# Patient Record
Sex: MALE | Race: WHITE | HISPANIC OR LATINO | Employment: FULL TIME | URBAN - METROPOLITAN AREA
[De-identification: names, ages, dates, MRNs, and addresses within clinical notes are randomized per-mention and may not be internally consistent; named-entity substitution may affect disease eponyms.]

---

## 2018-01-29 ENCOUNTER — IMMUNIZATION (OUTPATIENT)
Dept: URGENT CARE | Facility: CLINIC | Age: 53
End: 2018-01-29
Payer: COMMERCIAL

## 2018-01-29 PROCEDURE — 90686 IIV4 VACC NO PRSV 0.5 ML IM: CPT | Mod: S$GLB,,, | Performed by: EMERGENCY MEDICINE

## 2018-01-29 PROCEDURE — 90471 IMMUNIZATION ADMIN: CPT | Mod: S$GLB,,, | Performed by: EMERGENCY MEDICINE

## 2019-12-31 ENCOUNTER — HOSPITAL ENCOUNTER (OUTPATIENT)
Facility: HOSPITAL | Age: 54
LOS: 1 days | Discharge: HOME | End: 2019-12-31
Attending: PSYCHIATRY & NEUROLOGY | Admitting: PSYCHIATRY & NEUROLOGY
Payer: COMMERCIAL

## 2019-12-31 VITALS
WEIGHT: 190 LBS | HEIGHT: 68 IN | SYSTOLIC BLOOD PRESSURE: 134 MMHG | RESPIRATION RATE: 16 BRPM | BODY MASS INDEX: 28.79 KG/M2 | OXYGEN SATURATION: 99 % | TEMPERATURE: 98 F | DIASTOLIC BLOOD PRESSURE: 80 MMHG | HEART RATE: 72 BPM

## 2019-12-31 DIAGNOSIS — G47.00 INSOMNIA, UNSPECIFIED TYPE: Primary | ICD-10-CM

## 2019-12-31 DIAGNOSIS — G45.9 TIA (TRANSIENT ISCHEMIC ATTACK): ICD-10-CM

## 2019-12-31 PROBLEM — E78.5 HYPERLIPIDEMIA: Status: ACTIVE | Noted: 2019-12-31

## 2019-12-31 LAB
APTT BLDCRRT: 24 SEC (ref 21–32)
ASCENDING AORTA: 2.92 CM
AV INDEX (PROSTH): 0.81
AV MEAN GRADIENT: 2 MMHG
AV PEAK GRADIENT: 3 MMHG
AV VALVE AREA: 2.68 CM2
AV VELOCITY RATIO: 0.87
BILIRUB UR QL STRIP: NEGATIVE
BSA FOR ECHO PROCEDURE: 2.03 M2
CHOLEST SERPL-MCNC: 141 MG/DL (ref 120–199)
CHOLEST/HDLC SERPL: 4.7 {RATIO} (ref 2–5)
CK MB SERPL-MCNC: 1.2 NG/ML (ref 0.1–6.5)
CK MB SERPL-RTO: 0.8 % (ref 0–5)
CK SERPL-CCNC: 160 U/L (ref 20–200)
CLARITY UR REFRACT.AUTO: CLEAR
COLOR UR AUTO: YELLOW
CV ECHO LV RWT: 0.37 CM
DOP CALC AO PEAK VEL: 0.87 M/S
DOP CALC AO VTI: 18.04 CM
DOP CALC LVOT AREA: 3.3 CM2
DOP CALC LVOT DIAMETER: 2.05 CM
DOP CALC LVOT PEAK VEL: 0.76 M/S
DOP CALC LVOT STROKE VOLUME: 48.4 CM3
DOP CALCLVOT PEAK VEL VTI: 14.67 CM
E WAVE DECELERATION TIME: 256.88 MSEC
E/A RATIO: 1.22
E/E' RATIO: 8.35 M/S
ECHO LV POSTERIOR WALL: 0.96 CM (ref 0.6–1.1)
ESTIMATED AVG GLUCOSE: 117 MG/DL (ref 68–131)
FRACTIONAL SHORTENING: 23 % (ref 28–44)
GLUCOSE UR QL STRIP: NEGATIVE
HBA1C MFR BLD HPLC: 5.7 % (ref 4–5.6)
HDLC SERPL-MCNC: 30 MG/DL (ref 40–75)
HDLC SERPL: 21.3 % (ref 20–50)
HGB UR QL STRIP: NEGATIVE
INR PPP: 1.1 (ref 0.8–1.2)
INTERVENTRICULAR SEPTUM: 0.8 CM (ref 0.6–1.1)
KETONES UR QL STRIP: ABNORMAL
LA MAJOR: 5.34 CM
LA MINOR: 4.96 CM
LA WIDTH: 3.47 CM
LDLC SERPL CALC-MCNC: 91.8 MG/DL (ref 63–159)
LEFT ATRIUM SIZE: 3.45 CM
LEFT ATRIUM VOLUME INDEX: 26.2 ML/M2
LEFT ATRIUM VOLUME: 52.33 CM3
LEFT INTERNAL DIMENSION IN SYSTOLE: 4 CM (ref 2.1–4)
LEFT VENTRICLE DIASTOLIC VOLUME INDEX: 31.67 ML/M2
LEFT VENTRICLE DIASTOLIC VOLUME: 63.34 ML
LEFT VENTRICLE MASS INDEX: 82 G/M2
LEFT VENTRICLE SYSTOLIC VOLUME INDEX: 11.9 ML/M2
LEFT VENTRICLE SYSTOLIC VOLUME: 23.8 ML
LEFT VENTRICULAR INTERNAL DIMENSION IN DIASTOLE: 5.2 CM (ref 3.5–6)
LEFT VENTRICULAR MASS: 164.13 G
LEUKOCYTE ESTERASE UR QL STRIP: NEGATIVE
LV LATERAL E/E' RATIO: 8.88 M/S
LV SEPTAL E/E' RATIO: 7.89 M/S
MV PEAK A VEL: 0.58 M/S
MV PEAK E VEL: 0.71 M/S
NITRITE UR QL STRIP: NEGATIVE
NONHDLC SERPL-MCNC: 111 MG/DL
PH UR STRIP: 5 [PH] (ref 5–8)
PROT UR QL STRIP: NEGATIVE
PROTHROMBIN TIME: 11.2 SEC (ref 9–12.5)
RA MAJOR: 4.29 CM
RA PRESSURE: 3 MMHG
RA WIDTH: 2.77 CM
RIGHT VENTRICULAR END-DIASTOLIC DIMENSION: 2.86 CM
SINUS: 3.26 CM
SP GR UR STRIP: >1.03 (ref 1–1.03)
STJ: 2.98 CM
TDI LATERAL: 0.08 M/S
TDI SEPTAL: 0.09 M/S
TDI: 0.09 M/S
TRICUSPID ANNULAR PLANE SYSTOLIC EXCURSION: 2.3 CM
TRIGL SERPL-MCNC: 96 MG/DL (ref 30–150)
TROPONIN I SERPL DL<=0.01 NG/ML-MCNC: <0.006 NG/ML (ref 0–0.03)
TSH SERPL DL<=0.005 MIU/L-ACNC: 2.4 UIU/ML (ref 0.4–4)
URN SPEC COLLECT METH UR: ABNORMAL

## 2019-12-31 PROCEDURE — G8989 SELF CARE D/C STATUS: HCPCS | Mod: CH

## 2019-12-31 PROCEDURE — G0378 HOSPITAL OBSERVATION PER HR: HCPCS

## 2019-12-31 PROCEDURE — 99223 PR INITIAL HOSPITAL CARE,LEVL III: ICD-10-PCS | Mod: ,,, | Performed by: NURSE PRACTITIONER

## 2019-12-31 PROCEDURE — 97802 MEDICAL NUTRITION INDIV IN: CPT | Mod: 59

## 2019-12-31 PROCEDURE — 84484 ASSAY OF TROPONIN QUANT: CPT

## 2019-12-31 PROCEDURE — 85610 PROTHROMBIN TIME: CPT

## 2019-12-31 PROCEDURE — 82553 CREATINE MB FRACTION: CPT

## 2019-12-31 PROCEDURE — 36415 COLL VENOUS BLD VENIPUNCTURE: CPT

## 2019-12-31 PROCEDURE — 85730 THROMBOPLASTIN TIME PARTIAL: CPT

## 2019-12-31 PROCEDURE — 97161 PT EVAL LOW COMPLEX 20 MIN: CPT

## 2019-12-31 PROCEDURE — 82550 ASSAY OF CK (CPK): CPT

## 2019-12-31 PROCEDURE — G8988 SELF CARE GOAL STATUS: HCPCS | Mod: CH

## 2019-12-31 PROCEDURE — 25000003 PHARM REV CODE 250: Performed by: NURSE PRACTITIONER

## 2019-12-31 PROCEDURE — 99223 1ST HOSP IP/OBS HIGH 75: CPT | Mod: ,,, | Performed by: NURSE PRACTITIONER

## 2019-12-31 PROCEDURE — 81003 URINALYSIS AUTO W/O SCOPE: CPT

## 2019-12-31 PROCEDURE — G8987 SELF CARE CURRENT STATUS: HCPCS | Mod: CH

## 2019-12-31 PROCEDURE — 99217 PR OBSERVATION CARE DISCHARGE: ICD-10-PCS | Mod: ,,, | Performed by: PSYCHIATRY & NEUROLOGY

## 2019-12-31 PROCEDURE — 97165 OT EVAL LOW COMPLEX 30 MIN: CPT

## 2019-12-31 PROCEDURE — 80061 LIPID PANEL: CPT

## 2019-12-31 PROCEDURE — 84443 ASSAY THYROID STIM HORMONE: CPT

## 2019-12-31 PROCEDURE — 99217 PR OBSERVATION CARE DISCHARGE: CPT | Mod: ,,, | Performed by: PSYCHIATRY & NEUROLOGY

## 2019-12-31 PROCEDURE — 83036 HEMOGLOBIN GLYCOSYLATED A1C: CPT

## 2019-12-31 RX ORDER — LABETALOL HCL 20 MG/4 ML
10 SYRINGE (ML) INTRAVENOUS EVERY 6 HOURS PRN
Status: DISCONTINUED | OUTPATIENT
Start: 2019-12-31 | End: 2019-12-31 | Stop reason: HOSPADM

## 2019-12-31 RX ORDER — ATORVASTATIN CALCIUM 40 MG/1
40 TABLET, FILM COATED ORAL DAILY
Qty: 90 TABLET | Refills: 3 | Status: SHIPPED | OUTPATIENT
Start: 2020-01-01 | End: 2020-03-20

## 2019-12-31 RX ORDER — ACETAMINOPHEN 325 MG/1
650 TABLET ORAL EVERY 6 HOURS PRN
Status: DISCONTINUED | OUTPATIENT
Start: 2019-12-31 | End: 2019-12-31 | Stop reason: HOSPADM

## 2019-12-31 RX ORDER — ATORVASTATIN CALCIUM 20 MG/1
40 TABLET, FILM COATED ORAL DAILY
Status: DISCONTINUED | OUTPATIENT
Start: 2019-12-31 | End: 2019-12-31 | Stop reason: HOSPADM

## 2019-12-31 RX ORDER — ONDANSETRON 8 MG/1
8 TABLET, ORALLY DISINTEGRATING ORAL EVERY 8 HOURS PRN
Status: DISCONTINUED | OUTPATIENT
Start: 2019-12-31 | End: 2019-12-31 | Stop reason: HOSPADM

## 2019-12-31 RX ORDER — ASPIRIN 81 MG/1
81 TABLET ORAL DAILY
Status: DISCONTINUED | OUTPATIENT
Start: 2019-12-31 | End: 2019-12-31 | Stop reason: HOSPADM

## 2019-12-31 RX ORDER — SODIUM CHLORIDE 0.9 % (FLUSH) 0.9 %
10 SYRINGE (ML) INJECTION
Status: DISCONTINUED | OUTPATIENT
Start: 2019-12-31 | End: 2019-12-31 | Stop reason: HOSPADM

## 2019-12-31 RX ORDER — POLYETHYLENE GLYCOL 3350 17 G/17G
17 POWDER, FOR SOLUTION ORAL DAILY PRN
Status: DISCONTINUED | OUTPATIENT
Start: 2019-12-31 | End: 2019-12-31 | Stop reason: HOSPADM

## 2019-12-31 RX ORDER — ASPIRIN 81 MG/1
81 TABLET ORAL DAILY
Qty: 360 TABLET | Refills: 0 | Status: SHIPPED | OUTPATIENT
Start: 2020-01-01 | End: 2020-09-28 | Stop reason: SDUPTHER

## 2019-12-31 RX ADMIN — ASPIRIN 81 MG: 81 TABLET, COATED ORAL at 08:12

## 2019-12-31 RX ADMIN — ATORVASTATIN CALCIUM 40 MG: 20 TABLET, FILM COATED ORAL at 08:12

## 2019-12-31 NOTE — PLAN OF CARE
12/31/19 1149   Final Note   Assessment Type Final Discharge Note   Anticipated Discharge Disposition Home   Right Care Referral Info   Post Acute Recommendation No Care

## 2019-12-31 NOTE — HPI
53 y/o male who was driving from [facility] to [place] on 12-20-19 when around 1830 he started with dizziness and blurry vision in both eyes. He pulled over and called 911 and his wife. He then started with double and triple vision in both eyes. He was taken to [facility] in [place]. Symptoms resolved after 2 hours. No other focal neuro deficits. Since patient lives in [place] transfer to [place] was requested.   Patient usually goes to [facility] as PCP is Dr Blancas.  Upon arrival patient still with no neuro deficits. TIA workup will be carried out.   Patient has no real chronic medical issues and is on no medications

## 2019-12-31 NOTE — PLAN OF CARE
CM met with patient and spouse in room for [place] Planning Assessment.  Per patient,  patient lives with spouse in a H with 3 steps to enter.   Per patient, patient was independent with ADLS and used no DME for ambulation.  Per patient, the patient will have assistance from spouse upon discharge.   Discharge Planning Booklet given to patient/family and discussed.  All questions addressed.       12/31/19 1154   Discharge Assessment   Assessment Type Discharge Planning Assessment   Confirmed/corrected address and phone number on facesheet? Yes   Assessment information obtained from? Patient   Expected Length of Stay (days) 1   Communicated expected length of stay with patient/caregiver yes   Prior to hospitilization cognitive status: Alert/Oriented   Prior to hospitalization functional status: Independent   Current cognitive status: Alert/Oriented   Current Functional Status: Independent   Facility Arrived From: [facility] in [place]   Lives With spouse   Able to Return to Prior Arrangements yes   Is patient able to care for self after discharge? Yes   Who are your caregiver(s) and their phone number(s)? Purvi Smith (wife) [phone]   Patient's perception of discharge disposition home or selfcare   Readmission Within the Last 30 Days no previous admission in last 30 days   Patient currently being followed by outpatient case management? No   Patient currently receives any other outside agency services? No   Equipment Currently Used at Home none   Do you have any problems affording any of your prescribed medications? No   Is the patient taking medications as prescribed? yes   Does the patient have transportation home? Yes   Transportation Anticipated family or friend will provide  (spouse)   Dialysis Name and Scheduled days na   Does the patient receive services at the Coumadin Clinic? No   Discharge Plan A Home with family   Discharge Plan B Home with family   DME Needed Upon Discharge   none   Patient/Family in Agreement with Plan yes        PCP:  Mauri Blancas MD      Pharmacy[facility]    [id number] [address] [address]  Phone: [phone] Fax: [phone]        Emergency Contacts:  Extended Emergency Contact Information  Primary Emergency Contact: Purvi Smith   [place]  Mobile Phone: [phone]  Relation: Spouse      Insurance:    Payor: [employer] / Plan: [facility] CHOICE PLUS / Product Type: Commercial /       Mayra Mercedes RN  Case Management  Ext: [phone]  12/31/2019  11:58 AM

## 2019-12-31 NOTE — DISCHARGE SUMMARY
[facility]  Vascular Neurology  Comprehensive Stroke Center  Discharge Summary     Summary:     Admit Date: 12/31/2019  1:05 AM    Discharge Date and Time:  12/31/2019 11:02 AM    Attending Physician: Verónica Owusu MD     Discharge Provider: Rachel Rivera MD    History of Present Illness: 55 y/o male who was driving from [facility] to [place] on 12-20-19 when around 1830 he started with dizziness and blurry vision in both eyes. He pulled over and called 911 and his wife. He then started with double and triple vision in both eyes. He was taken to [facility] in [place]. Symptoms resolved after 2 hours. No other focal neuro deficits. Since patient lives in [place] transfer to [place] was requested.   Patient usually goes to [facility] as PCP is Dr Blancas.  Upon arrival patient still with no neuro deficits. TIA workup will be carried out.   Patient has no real chronic medical issues and is on no medications    Hospital Course (synopsis of major diagnoses, care, treatment, and services provided during the course of the hospital stay): Patient admitted overnight with TIA symptoms. Patient will be discharged on ASA 81 mg and lipitor 40 mg. Can follow up with vascular neurology in 4 weeks. Awaiting echo results which can be discussed during stroke clinic visit.     Stroke Etiology: Probable Small Artery Occlusion (LITTLE)    STROKE DOCUMENTATION   Acute Stroke Times   Last Known Normal Date: 12/30/19  Last Known Normal Time: 1830  Symptom Onset Date: 12/30/19  Symptom Onset Time: 1830  Stroke Team Called Date: 12/30/19  Stroke Team Arrival Date: 12/30/19  Stroke Team Arrival Time: 0050     NIH Scale:  1a. Level of Consciousness: 0-->Alert, keenly responsive  1b. LOC Questions: 0-->Answers both questions correctly  1c. LOC Commands: 0-->Performs both tasks correctly  2. Best Gaze: 0-->Normal  3. Visual: 0-->No visual loss  4. Facial Palsy: 0-->Normal  symmetrical movements  5a. Motor Arm, Left: 0-->No drift, limb holds 90 (or 45) degrees for full 10 secs  5b. Motor Arm, Right: 0-->No drift, limb holds 90 (or 45) degrees for full 10 secs  6a. Motor Leg, Left: 0-->No drift, leg holds 30 degree position for full 5 secs  6b. Motor Leg, Right: 0-->No drift, leg holds 30 degree position for full 5 secs  7. Limb Ataxia: 0-->Absent  8. Sensory: 0-->Normal, no sensory loss  9. Best Language: 0-->No aphasia, normal  10. Dysarthria: 0-->Normal  11. Extinction and Inattention (formerly Neglect): 0-->No abnormality  Total (NIH Stroke Scale): 0        Modified Lincoln Score: 0  Gove Coma Scale:15   ABCD2 Score: 2  [id number] Score:   HAS -BLED Score:   ICH Score:   Hunt & Thomas Classification:       Assessment/Plan:     Diagnostic Results:      Brain imaging:  CT head w/o contrast 12-30-19 reviewed from [facility]     Vessel Imaging:  CTA Head and neck 12-30-19 reviewed from [facility]    MRI Brain without contrast          Noncontrast MRI examination demonstrates no acute intracranial abnormality.  Specifically, no evidence of acute infarction.       Cardiac Evaluation:   TTE: pending     Interventions: None    Complications: None    Disposition:     Final Active Diagnoses:    Diagnosis Date Noted POA    PRINCIPAL PROBLEM:  TIA (transient ischemic attack) [G45.9] 12/31/2019 Yes    Hyperlipidemia [E78.5] 12/31/2019 Unknown    Insomnia [G47.00] 12/31/2019 Unknown      Problems Resolved During this Admission:     * TIA (transient ischemic attack)  53 y/o male with TIA involving dizziness and blurry/double vision for 2 hours that resolved.     Antithrombotics: ASA 81 mg daily    Statins: Lipitor 40 mg daily    Aggressive risk factor modification: None     Rehab efforts: The patient has been evaluated by a stroke team provider and the therapy needs have been fully considered based off the presenting  complaints and exam findings. The following therapy evaluations are needed: None    Diagnostics ordered/pending: HgbA1C to assess blood glucose levels, Lipid Profile to assess cholesterol levels, TTE to assess cardiac function/status     VTE prophylaxis: Heparin 5000 units SQ every 8 hours    BP parameters: TIA: SBP <220 until imaging confirmation of no infarct         Insomnia  Patient having symptoms of waking up frequently throughout the night. He is also feeling lethargic throughout the day as well. Will order sleep study as outpatient.     Hyperlipidemia  LDL 91.8  -Lipitor 40 mg daily         Recommendations:     Post-discharge complication risks: None     Stroke Education given to: patient and family    Follow-up in Stroke Clinic in 30 days.     Discharge Plan:  Antithrombotics: Aspirin 81 mg  Statin: Atorvastatin 40 mg    Follow Up:  Follow-up Information     Mauri Blancas MD.    Specialty:  Internal Medicine  Why:  Outpatient Services  Contact information:  [address]   [address] [place] [address]  [phone]             [facility] In 4 weeks.    Specialty:  Vascular Neurology  Contact information:  [facility] [address]  [phone]                 Patient Instructions:      Ambulatory Referral to Vascular Neurology   Referral Priority: Routine Referral Type: Consultation   Referral Reason: Specialty Services Required   Requested Specialty: Vascular Neurology   Number of Visits Requested: 1     Polysomnogram (CPAP will be added if patient meets diagnostic criteria.)   Standing Status: Future Standing Exp. Date: 12/31/20       Medications:  Reconciled Home Medications:      Medication List      START taking these medications    aspirin 81 MG EC tablet  Commonly known as:  ECOTRIN  Take 1 tablet (81 mg total) by mouth once daily.  Start taking on:  January 1, 2020     atorvastatin 40 MG tablet  Commonly known as:  LIPITOR  Take 1 tablet (40 mg  total) by mouth once daily.  Start taking on:  January 1, 2020            Rachel Rivera MD  [facility]  Department of Vascular Neurology   [facility]

## 2019-12-31 NOTE — PLAN OF CARE
12/31/19 1153   Post-Acute Status   Post-Acute Authorization HME   Other Status No Post-Acute Service Needs   Discharge Delays None known at this time   Mayra Mercedes RN  Case Management  Ext: [phone]  12/31/2019  11:54 AM

## 2019-12-31 NOTE — ASSESSMENT & PLAN NOTE
Patient having symptoms of waking up frequently throughout the night. He is also feeling lethargic throughout the day as well. Will order sleep study as outpatient.

## 2019-12-31 NOTE — PLAN OF CARE
.Pt resting in bed, no c/o pain, resp even and unlabored, all precautions set in place to prevent any falls.Call light in reach, bed alarm activated and bed low to floor.  Room dimmed, clutter free,all needed supplies near bedside.Will cont to monitor

## 2019-12-31 NOTE — PLAN OF CARE
Patient DENITA of 20. He is able to swallow solid food and drink water without any complications.   Rachel Rivera MD

## 2019-12-31 NOTE — NURSING
Pt arrived to unit via gurney at 0050 am, pt alert and oriented, no medical history, v/s stable,lungs clear, skin  Intact, no c/o any vision issues at the moment, gate when ambulating is steady, attempted to notify NP Berenice on call no answer for orders,will re try and call back for orders for pt.  Pt alert stable and wife and son at the bedside,  Bed alarm and fall risk socks on to prevent falls

## 2019-12-31 NOTE — ASSESSMENT & PLAN NOTE
55 y/o male with TIA involving dizziness and blurry/double vision for 2 hours that resolved.     Antithrombotics: ASA 81 mg daily    Statins: Lipitor 40 mg daily    Aggressive risk factor modification: None     Rehab efforts: The patient has been evaluated by a stroke team provider and the therapy needs have been fully considered based off the presenting complaints and exam findings. The following therapy evaluations are needed: None    Diagnostics ordered/pending: HgbA1C to assess blood glucose levels, Lipid Profile to assess cholesterol levels, TTE to assess cardiac function/status     VTE prophylaxis: Heparin 5000 units SQ every 8 hours    BP parameters: TIA: SBP <220 until imaging confirmation of no infarct

## 2019-12-31 NOTE — PLAN OF CARE
Problem: Occupational Therapy Goal  Goal: Occupational Therapy Goal  Outcome: Met     Pt presents with no functional impairments that would affect his ability to complete his self care tasks and functional mobility on this date. No skilled OT needed in this setting. Pt safe to discharge home with no further therapy needs.    Valery Huerta OTR/L  12/31/19

## 2019-12-31 NOTE — NURSING
Patient prepared for discharge, IV removed, discharge instructions reviewed, verbalized understanding.

## 2019-12-31 NOTE — PLAN OF CARE
Erik. Pt at baseline.    Kaley Parra, PT, DPT  12/31/2019      Problem: Physical Therapy Goal  Goal: Physical Therapy Goal  Outcome: Met

## 2019-12-31 NOTE — PT/OT/SLP EVAL
"Occupational Therapy   Evaluation and Discharge Note    Name: Calvin Smith  MRN: [id number]  Admitting Diagnosis:  TIA (transient ischemic attack)      Recommendations:     Discharge Recommendations: home  Discharge Equipment Recommendations:  none  Barriers to discharge:  None    Assessment:     Calvin Smith is a 54 y.o. male with a medical diagnosis of TIA (transient ischemic attack). Pt presented with WFL strength and ROM to complete occupations of choice. Pt tolerated evaluation well displaying no deficits affecting ADL performance. At this time, patient is functioning at their prior level of function and does not require further acute OT services.     Plan:     During this hospitalization, patient does not require further acute OT services.  Please re-consult if situation changes.    · Plan of Care Reviewed with: patient    Subjective     Chief Complaint: None   Patient/Family Comments/goals: Pt agreeable to OT/PT evaluation and OT POC.     Occupational Profile:  Living Environment: Pt lives with wife in a H with 3 or 4 MIKE and R handrail. 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     Pain/Comfort: "I'm close to 99%."     · Pain Rating 1: 0/10  · Pain Rating Post-Intervention 1: 0/10    Patients cultural, spiritual, Jew conflicts given the current situation: no    Objective:     Communicated with: RN prior to session.  Patient found HOB elevated with telemetry upon OT entry to room.    General Precautions: Standard, fall   Orthopedic Precautions:N/A   Braces: N/A     Occupational Performance:    Bed Mobility:    · Patient completed Rolling/Turning to Left with  independence  · Patient completed Scooting/Bridging with independence  · Patient completed Supine " to Sit with independence    Functional Mobility/Transfers:  · Patient completed Sit <> Stand Transfer from bed with independence  with  no assistive device   · Functional Mobility: Pt ambulated HHD with independence and no AD. No LOB noted. No RBs required.    Activities of Daily Living:  · Lower Body Dressing: independence donning socks EOB     Cognitive/Visual Perceptual:  Cognitive/Psychosocial Skills:     -       Oriented to: Person, Place, Time and Situation   -       Follows Commands/attention:Follows multistep  commands  -       Communication: clear/fluent  -       Memory: No Deficits noted  -       Safety awareness/insight to disability: intact   -       Mood/Affect/Coping skills/emotional control: Appropriate to situation  Visual/Perceptual:      -Intact no deficits noted during functional tasks; no blurriness reported with increased ax    Physical Exam:  Balance:    -       Intact  Postural examination/scapula alignment:    -       No postural abnormalities identified  Skin integrity: Visible skin intact  Edema:  None noted  Sensation:    -       Intact  Upper Extremity Range of Motion:     -       Right Upper Extremity: WFL  -       Left Upper Extremity: WFL  Upper Extremity Strength:    -       Right Upper Extremity: WFL  -       Left Upper Extremity: WFL   Strength:    -       Right Upper Extremity: WFL  -       Left Upper Extremity: WFL  Fine Motor Coordination:    -       Intact    AMPAC 6 Click ADL:  AMPAC Total Score: 24    Treatment & Education:  - Role of OT/ OT POC  - Self care safety/ independence  - Functional transfer/ mobility safety  - Bed mobility safety  - Importance of sitting UIC throughout the day  - Importance of ambulating outside room with nsg and/or family to prevent debility during stay  Education:    Patient left sitting EOB with all lines intact, call button in reach, RN notified and family present    GOALS:   Multidisciplinary Problems     Occupational Therapy Goals     Not  on file          Multidisciplinary Problems (Resolved)        Problem: Occupational Therapy Goal    Goal Priority Disciplines Outcome Interventions   Occupational Therapy Goal   (Resolved)     OT, PT/OT Met                    History:     Past Medical History:   Diagnosis Date    Anxiety     Colon polyp     Fatty liver        Past Surgical History:   Procedure Laterality Date    APPENDECTOMY      COLONOSCOPY         Time Tracking:     OT Date of Treatment: 12/31/19  OT Start Time: 1044  OT Stop Time: 1054  OT Total Time (min): 10 min    Billable Minutes:Evaluation 10    Valery Huerta OT  12/31/2019

## 2019-12-31 NOTE — ASSESSMENT & PLAN NOTE
53 y/o male with TIA involving dizziness and blurry/double vision for 2 hours that resolved.     Antithrombotics: ASA 81 mg daily    Statins: Lipitor 40 mg daily    Aggressive risk factor modification: None     Rehab efforts: The patient has been evaluated by a stroke team provider and the therapy needs have been fully considered based off the presenting complaints and exam findings. The following therapy evaluations are needed: None    Diagnostics ordered/pending: HgbA1C to assess blood glucose levels, Lipid Profile to assess cholesterol levels, MRI head without contrast to assess brain parenchyma, TTE to assess cardiac function/status , TSH to assess thyroid function    VTE prophylaxis: Heparin 5000 units SQ every 8 hours    BP parameters: TIA: SBP <220 until imaging confirmation of no infarct        n/a

## 2019-12-31 NOTE — PLAN OF CARE
12/31/19 1231   Final Note   Assessment Type Final Discharge Note   Anticipated Discharge Disposition Home   Right Care Referral Info   Post Acute Recommendation No Care   Mayra Mercedes RN  Case Management  Ext: [phone]  12/31/2019  12:31 PM

## 2019-12-31 NOTE — SUBJECTIVE & OBJECTIVE
Past Medical History:   Diagnosis Date    Anxiety     Colon polyp     Fatty liver      Past Surgical History:   Procedure Laterality Date    APPENDECTOMY      COLONOSCOPY       History reviewed. No pertinent family history.  Social History     Tobacco Use    Smoking status: Never Smoker    Smokeless tobacco: Never Used   Substance Use Topics    Alcohol use: Not on file    Drug use: Never     Review of patient's allergies indicates:  No Known Allergies    Medications: I have reviewed the current medication administration record.    No medications prior to admission.       Review of Systems   Constitutional: Negative for chills and fever.   HENT: Negative for ear discharge and ear pain.    Eyes: Positive for visual disturbance. Negative for pain and itching.   Respiratory: Negative for cough and shortness of breath.    Cardiovascular: Negative for chest pain and leg swelling.   Gastrointestinal: Negative for abdominal distention and abdominal pain.   Endocrine: Negative for heat intolerance and polydipsia.   Genitourinary: Negative for difficulty urinating and dysuria.   Musculoskeletal: Negative for arthralgias and back pain.   Skin: Negative for rash and wound.   Neurological: Negative for dizziness, facial asymmetry, speech difficulty, weakness and numbness.     Objective:     Vital Signs (Most Recent):  Temp: 97.2 °F (36.2 °C) (12/31/19 0153)  Pulse: 95 (12/31/19 0153)  Resp: 19 (12/31/19 0153)  BP: 136/82 (12/31/19 0153)  SpO2: 99 % (12/31/19 0100)    Vital Signs Range (Last 24H):  Temp:  [97.2 °F (36.2 °C)-97.4 °F (36.3 °C)]   Pulse:  [74-95]   Resp:  [18-19]   BP: (126-138)/(82-84)   SpO2:  [97 %-99 %]     Physical Exam   Constitutional: He is oriented to person, place, and time. He appears well-developed and well-nourished.   HENT:   Head: Normocephalic and atraumatic.   Eyes: EOM are normal.   Neck: Normal range of motion.   Cardiovascular: Normal rate.   Pulmonary/Chest: Effort normal.    Abdominal: Soft.   Neurological: He is alert and oriented to person, place, and time.   Skin: Skin is warm and dry.   Nursing note and vitals reviewed.      Neurological Exam:   LOC: alert  Attention Span: Good   Language: No aphasia  Articulation: No dysarthria  Orientation: Person, Place, Time   Visual Fields: Full  EOM (CN III, IV, VI): Full/intact  Pupils (CN II, III): PERRL  Facial Sensation (CN V): Normal  Facial Movement (CN VII): Symmetric facial expression    Gag Reflex: present  Reflexes: flexor plantar responses bilaterally  Motor: Arm left  Normal 5/5  Leg left  Normal 5/5  Arm right  Normal 5/5  Leg right Normal 5/5  Cebellar: No evidence of appendicular or axial ataxia  Sensation: Intact to light touch, temperature and vibration  Tone: Normal tone throughout      Laboratory:  CMP: No results for input(s): GLUCOSE, CALCIUM, ALBUMIN, PROT, NA, K, CO2, CL, BUN, CREATININE, ALKPHOS, ALT, AST, BILITOT in the last 24 hours.  CBC: No results for input(s): WBC, RBC, HGB, HCT, PLT, MCV, MCH, MCHC in the last 168 hours.  Lipid Panel: No results for input(s): CHOL, LDLCALC, HDL, TRIG in the last 168 hours.  Coagulation: No results for input(s): PT, INR, APTT in the last 168 hours.  Hgb A1C: No results for input(s): HGBA1C in the last 168 hours.  TSH: No results for input(s): TSH in the last 168 hours.    Diagnostic Results:      Brain imaging:  CT head w/o contrast 12-30-19 reviewed from [facility]    Vessel Imaging:  CTA Head and neck 12-30-19 reviewed from [facility]      Cardiac Evaluation:

## 2019-12-31 NOTE — H&P
[facility]  Vascular Neurology  Comprehensive Stroke Center  History & Physical    Consults  Assessment/Plan:     Patient is a 54 y.o. year old male with:    * TIA (transient ischemic attack)  53 y/o male with TIA involving dizziness and blurry/double vision for 2 hours that resolved.     Antithrombotics: ASA 81 mg daily    Statins: Lipitor 40 mg daily    Aggressive risk factor modification: None     Rehab efforts: The patient has been evaluated by a stroke team provider and the therapy needs have been fully considered based off the presenting complaints and exam findings. The following therapy evaluations are needed: None    Diagnostics ordered/pending: HgbA1C to assess blood glucose levels, Lipid Profile to assess cholesterol levels, MRI head without contrast to assess brain parenchyma, TTE to assess cardiac function/status , TSH to assess thyroid function    VTE prophylaxis: Heparin 5000 units SQ every 8 hours    BP parameters: TIA: SBP <220 until imaging confirmation of no infarct             STROKE DOCUMENTATION     Acute Stroke Times   Last Known Normal Date: 12/30/19  Last Known Normal Time: 1830  Symptom Onset Date: 12/30/19  Symptom Onset Time: 1830  Stroke Team Called Date: 12/30/19  Stroke Team Arrival Date: 12/30/19  Stroke Team Arrival Time: 0050    NIH Scale:  1a. Level of Consciousness: 0-->Alert, keenly responsive  1b. LOC Questions: 0-->Answers both questions correctly  1c. LOC Commands: 0-->Performs both tasks correctly  2. Best Gaze: 0-->Normal  3. Visual: 0-->No visual loss  4. Facial Palsy: 0-->Normal symmetrical movements  5a. Motor Arm, Left: 0-->No drift, limb holds 90 (or 45) degrees for full 10 secs  5b. Motor Arm, Right: 0-->No drift, limb holds 90 (or 45) degrees for full 10 secs  6a. Motor Leg, Left: 0-->No drift, leg holds 30 degree position for full 5 secs  6b. Motor Leg, Right: 0-->No drift, leg holds 30 degree position for full 5 secs  7. Limb Ataxia:  0-->Absent  8. Sensory: 0-->Normal, no sensory loss  9. Best Language: 0-->No aphasia, normal  10. Dysarthria: 0-->Normal  11. Extinction and Inattention (formerly Neglect): 0-->No abnormality  Total (NIH Stroke Scale): 0     Modified [place] Score: 0  [place] Coma Scale:15   ABCD2 Score: 2  [id number] Score:   HAS -BLED Score:   ICH Score:   Hunt & Thomas Classification:      Thrombolysis Candidate? No, symptoms resolved    Delays to Thrombolysis?  No    Interventional Revascularization Candidate?   Is the patient eligible for mechanical endovascular reperfusion (PERNELL)?  No; No significant neurological deficit    Hemorrhagic change of an Ischemic Stroke: Does this patient have an ischemic stroke with hemorrhagic changes? No         Subjective:     History of Present Illness:  53 y/o male who was driving from [facility] to [place] on 12-20-19 when around 1830 he started with dizziness and blurry vision in both eyes. He pulled over and called 911 and his wife. He then started with double and triple vision in both eyes. He was taken to [facility] in [place]. Symptoms resolved after 2 hours. No other focal neuro deficits. Since patient lives in [place] to [place] was requested.   Patient usually goes to [facility] as PCP is Dr Blancas.  Upon arrival patient still with no neuro deficits. TIA workup will be carried out.   Patient has no real chronic medical issues and is on no medications        Past Medical History:   Diagnosis Date    Anxiety     Colon polyp     Fatty liver      Past Surgical History:   Procedure Laterality Date    APPENDECTOMY      COLONOSCOPY       History reviewed. No pertinent family history.  Social History     Tobacco Use    Smoking status: Never Smoker    Smokeless tobacco: Never Used   Substance Use Topics    Alcohol use: Not on file    Drug use: Never     Review of patient's allergies indicates:  No Known Allergies    Medications: I  have reviewed the current medication administration record.    No medications prior to admission.       Review of Systems   Constitutional: Negative for chills and fever.   HENT: Negative for ear discharge and ear pain.    Eyes: Positive for visual disturbance. Negative for pain and itching.   Respiratory: Negative for cough and shortness of breath.    Cardiovascular: Negative for chest pain and leg swelling.   Gastrointestinal: Negative for abdominal distention and abdominal pain.   Endocrine: Negative for heat intolerance and polydipsia.   Genitourinary: Negative for difficulty urinating and dysuria.   Musculoskeletal: Negative for arthralgias and back pain.   Skin: Negative for rash and wound.   Neurological: Negative for dizziness, facial asymmetry, speech difficulty, weakness and numbness.     Objective:     Vital Signs (Most Recent):  Temp: 97.2 °F (36.2 °C) (12/31/19 0153)  Pulse: 95 (12/31/19 0153)  Resp: 19 (12/31/19 0153)  BP: 136/82 (12/31/19 0153)  SpO2: 99 % (12/31/19 0100)    Vital Signs Range (Last 24H):  Temp:  [97.2 °F (36.2 °C)-97.4 °F (36.3 °C)]   Pulse:  [74-95]   Resp:  [18-19]   BP: (126-138)/(82-84)   SpO2:  [97 %-99 %]     Physical Exam   Constitutional: He is oriented to person, place, and time. He appears well-developed and well-nourished.   HENT:   Head: Normocephalic and atraumatic.   Eyes: EOM are normal.   Neck: Normal range of motion.   Cardiovascular: Normal rate.   Pulmonary/Chest: Effort normal.   Abdominal: Soft.   Neurological: He is alert and oriented to person, place, and time.   Skin: Skin is warm and dry.   Nursing note and vitals reviewed.      Neurological Exam:   LOC: alert  Attention Span: Good   Language: No aphasia  Articulation: No dysarthria  Orientation: Person, Place, Time   Visual Fields: Full  EOM (CN III, IV, VI): Full/intact  Pupils (CN II, III): PERRL  Facial Sensation (CN V): Normal  Facial Movement (CN VII): Symmetric facial expression    Gag Reflex:  present  Reflexes: flexor plantar responses bilaterally  Motor: Arm left  Normal 5/5  Leg left  Normal 5/5  Arm right  Normal 5/5  Leg right Normal 5/5  Cebellar: No evidence of appendicular or axial ataxia  Sensation: Intact to light touch, temperature and vibration  Tone: Normal tone throughout      Laboratory:  CMP: No results for input(s): GLUCOSE, CALCIUM, ALBUMIN, PROT, NA, K, CO2, CL, BUN, CREATININE, ALKPHOS, ALT, AST, BILITOT in the last 24 hours.  CBC: No results for input(s): WBC, RBC, HGB, HCT, PLT, MCV, MCH, MCHC in the last 168 hours.  Lipid Panel: No results for input(s): CHOL, LDLCALC, HDL, TRIG in the last 168 hours.  Coagulation: No results for input(s): PT, INR, APTT in the last 168 hours.  Hgb A1C: No results for input(s): HGBA1C in the last 168 hours.  TSH: No results for input(s): TSH in the last 168 hours.    Diagnostic Results:      Brain imaging:  CT head w/o contrast 12-30-19 reviewed from [facility]    Vessel Imaging:  CTA Head and neck 12-30-19 reviewed from [facility]      Cardiac Evaluation:           Berenice Daniel NP  [facility]  Department of Vascular Neurology   [facility]

## 2019-12-31 NOTE — PT/OT/SLP EVAL
"Physical Therapy Evaluation and Discharge Note    Patient Name:  Calvin Smith   MRN:  [id number]    Recommendations:     Discharge Recommendations:  home   Discharge Equipment Recommendations: none   Barriers to discharge: None    Assessment:     Calvin Smith is a 54 y.o. male admitted with a medical diagnosis of TIA (transient ischemic attack). .  At this time, patient is functioning at their prior level of function and does not require further acute PT services.     Recent Surgery: * No surgery found *      Plan:     During this hospitalization, patient does not require further acute PT services.  Please re-consult if situation changes.      Subjective     Chief Complaint: none  Patient/Family Comments/goals: ready to go home. "Everything is fine"  Pain/Comfort:  · Pain Rating 1: 0/10  · Pain Rating Post-Intervention 1: 0/10    Patients cultural, spiritual, Gnosticism conflicts given the current situation: no    Living Environment:  Pt lives with wife in a H with 2-3 MIKE with a R HR and walk in shower.  Prior to admission, patients level of function was independent.  Equipment used at home: none.  DME owned (not currently used): none.  Upon discharge, patient will have assistance from spouse.    Objective:     Communicated with nursing prior to session.  Patient found HOB elevated with telemetry upon PT entry to room.    General Precautions: Standard, fall   Orthopedic Precautions:N/A   Braces: N/A     Exams:  · Cognitive Exam:  Patient is oriented to Person, Place, Time and Situation  · Gross Motor Coordination:  WFL  · Sensation: denies numbness or tingling  · RLE ROM: WFL  · RLE Strength: WFL  · LLE ROM: WFL  · LLE Strength: WFL    Functional Mobility:  · Bed Mobility:     · Supine to Sit: independence  · Sit to Supine: independence  · Transfers:     · Sit to Stand:  supervision with no AD  · Gait: ~160 ft, no AD, supervision  · Dynamic Standing Balance: SBA  · Horizontal head turns: no unsteadiness or LOB, " pt denies dizziness   · Vertical Head turns:  no unsteadiness or LOB, pt denies dizziness   · 180 degrees turn/change in direction: no unsteadiness or LOB, pt denies dizziness   · Stairs:  Pt ascended/descended 1 flight(s) with No Assistive Device with no handrails with Stand-by Assistance and reciprocal gait pattern.    Visual/Auditory:   Tracking/Smooth Pursuits:Impaired: mild jerkiness, no symptoms provocation; mild L end range nystagmus noted  Saccades: Intact  R/L discrimination: Intact  Convergence: intact  VOR: Intact      AM-PAC 6 CLICK MOBILITY  Total Score:24       Therapeutic Activities and Exercises:  Patient educated on role of therapy, goals of session, and benefits of mobilizing. Discussed PT plan for D/C of PT orders. Patient educated on calling for assisatnce. Patient educated on how their diagnosis impacts their mobility within PT scope of practice.   Communication board updated.  All questions answered within PT scope of practice.      AM-PAC 6 CLICK MOBILITY  Total Score:24     Patient left HOB elevated with all lines intact, call button in reach and nursing notified and family present.    GOALS:   Multidisciplinary Problems     Physical Therapy Goals     Not on file          Multidisciplinary Problems (Resolved)        Problem: Physical Therapy Goal    Goal Priority Disciplines Outcome Goal Variances Interventions   Physical Therapy Goal   (Resolved)     PT, PT/OT Met                     History:     Past Medical History:   Diagnosis Date    Anxiety     Colon polyp     Fatty liver        Past Surgical History:   Procedure Laterality Date    APPENDECTOMY      COLONOSCOPY         Time Tracking:     PT Received On: 12/31/19  PT Start Time: 1044     PT Stop Time: 1054  PT Total Time (min): 10 min co eval with OT    Billable Minutes: Evaluation 10      Kaley Parra PT  12/31/2019

## 2019-12-31 NOTE — HOSPITAL COURSE
Patient admitted overnight with TIA symptoms. Patient will be discharged on ASA 81 mg and lipitor 40 mg. Can follow up with vascular neurology in 4 weeks. Awaiting echo results which can be discussed during stroke clinic visit.

## 2019-12-31 NOTE — PLAN OF CARE
12/31/19 1117   Post-Acute Status   Post-Acute Authorization Other   Other Status No Post-Acute Service Needs       No SW needs noted.  SW in contact with CM and Medical staff. Will continue to follow and offer support as needed.     Marcos Giordano LMSW  [facility]   Ext. [phone]

## 2019-12-31 NOTE — CONSULTS
Food & Nutrition  Education    Diet Education:  Cardiac and Carb Consistent diet education  Time Spent: 25 minutes  Learners: patient, wife      Nutrition Education provided with handouts: low sodium diet, My Plate method, Carbohydrate Consistent diet      Comments: Pt eats out at fast food restaurants several times/week, eats sugar cereals for breakfast every day and likes bread and butter with his meals. Pt is prediabetic with A1c 5.7%. Provided extensive education on healthy, higher fiber carbohydrates instead of processed carbs. Pt also drinks soda every day; educated on healthier drink choices and strategies to quit drinking soda. Educated on higher sodium food items to avoid and encouraged pt to lead more active lifestyle. Pt and wife appear motivated and were able to repeat talking points.      All questions and concerns answered. Dietitian's contact information provided.       Follow-Up: 1/06/20    Please Re-consult as needed    Lisbeth Lake RD  Ext [phone]        Thanks!

## 2020-01-03 ENCOUNTER — OFFICE VISIT (OUTPATIENT)
Dept: FAMILY MEDICINE | Facility: CLINIC | Age: 55
End: 2020-01-03
Attending: FAMILY MEDICINE
Payer: COMMERCIAL

## 2020-01-03 VITALS
WEIGHT: 176 LBS | HEIGHT: 70 IN | DIASTOLIC BLOOD PRESSURE: 60 MMHG | SYSTOLIC BLOOD PRESSURE: 100 MMHG | HEART RATE: 98 BPM | BODY MASS INDEX: 25.2 KG/M2

## 2020-01-03 DIAGNOSIS — K63.5 POLYP OF COLON, UNSPECIFIED PART OF COLON, UNSPECIFIED TYPE: ICD-10-CM

## 2020-01-03 DIAGNOSIS — E78.5 DYSLIPIDEMIA: ICD-10-CM

## 2020-01-03 DIAGNOSIS — Z11.59 NEED FOR HEPATITIS C SCREENING TEST: ICD-10-CM

## 2020-01-03 DIAGNOSIS — K76.0 STEATOSIS OF LIVER: ICD-10-CM

## 2020-01-03 DIAGNOSIS — F41.9 ANXIETY: ICD-10-CM

## 2020-01-03 DIAGNOSIS — G45.9 TIA (TRANSIENT ISCHEMIC ATTACK): ICD-10-CM

## 2020-01-03 DIAGNOSIS — Z00.00 ROUTINE GENERAL MEDICAL EXAMINATION AT A HEALTH CARE FACILITY: Primary | ICD-10-CM

## 2020-01-03 DIAGNOSIS — G47.33 OSA (OBSTRUCTIVE SLEEP APNEA): ICD-10-CM

## 2020-01-03 DIAGNOSIS — G89.29 CHRONIC BILATERAL LOW BACK PAIN WITHOUT SCIATICA: ICD-10-CM

## 2020-01-03 DIAGNOSIS — Z00.00 LABORATORY EXAM ORDERED AS PART OF ROUTINE GENERAL MEDICAL EXAMINATION: ICD-10-CM

## 2020-01-03 DIAGNOSIS — M54.50 CHRONIC BILATERAL LOW BACK PAIN WITHOUT SCIATICA: ICD-10-CM

## 2020-01-03 DIAGNOSIS — Z91.89 FRAMINGHAM CARDIAC RISK <10% IN NEXT 10 YEARS: ICD-10-CM

## 2020-01-03 PROBLEM — M54.9 CHRONIC BILATERAL BACK PAIN: Status: ACTIVE | Noted: 2020-01-03

## 2020-01-03 PROCEDURE — 90471 FLU VACCINE (QUAD) GREATER THAN OR EQUAL TO 3YO PRESERVATIVE FREE IM: ICD-10-PCS | Mod: S$GLB,,, | Performed by: FAMILY MEDICINE

## 2020-01-03 PROCEDURE — 99386 PREV VISIT NEW AGE 40-64: CPT | Mod: 25,S$GLB,, | Performed by: FAMILY MEDICINE

## 2020-01-03 PROCEDURE — 90472 IMMUNIZATION ADMIN EACH ADD: CPT | Mod: S$GLB,,, | Performed by: FAMILY MEDICINE

## 2020-01-03 PROCEDURE — 90732 PPSV23 VACC 2 YRS+ SUBQ/IM: CPT | Mod: S$GLB,,, | Performed by: FAMILY MEDICINE

## 2020-01-03 PROCEDURE — 99386 PR PREVENTIVE VISIT,NEW,40-64: ICD-10-PCS | Mod: 25,S$GLB,, | Performed by: FAMILY MEDICINE

## 2020-01-03 PROCEDURE — 90732 PNEUMOCOCCAL POLYSACCHARIDE VACCINE 23-VALENT =>2YO SQ IM: ICD-10-PCS | Mod: S$GLB,,, | Performed by: FAMILY MEDICINE

## 2020-01-03 PROCEDURE — 99999 PR PBB SHADOW E&M-EST. PATIENT-LVL III: CPT | Mod: PBBFAC,,, | Performed by: FAMILY MEDICINE

## 2020-01-03 PROCEDURE — 90686 FLU VACCINE (QUAD) GREATER THAN OR EQUAL TO 3YO PRESERVATIVE FREE IM: ICD-10-PCS | Mod: S$GLB,,, | Performed by: FAMILY MEDICINE

## 2020-01-03 PROCEDURE — 99999 PR PBB SHADOW E&M-EST. PATIENT-LVL III: ICD-10-PCS | Mod: PBBFAC,,, | Performed by: FAMILY MEDICINE

## 2020-01-03 PROCEDURE — 90471 IMMUNIZATION ADMIN: CPT | Mod: S$GLB,,, | Performed by: FAMILY MEDICINE

## 2020-01-03 PROCEDURE — 90472 PNEUMOCOCCAL POLYSACCHARIDE VACCINE 23-VALENT =>2YO SQ IM: ICD-10-PCS | Mod: S$GLB,,, | Performed by: FAMILY MEDICINE

## 2020-01-03 PROCEDURE — 90686 IIV4 VACC NO PRSV 0.5 ML IM: CPT | Mod: S$GLB,,, | Performed by: FAMILY MEDICINE

## 2020-01-03 RX ORDER — FLUTICASONE PROPIONATE 50 MCG
1 SPRAY, SUSPENSION (ML) NASAL DAILY
COMMUNITY
End: 2020-04-21 | Stop reason: SDUPTHER

## 2020-01-03 NOTE — PROGRESS NOTES
Two patient identifiers verified. Allergies reviewed.  Pneumococcal 23 IM administered to Left deltoid and FLU Vaccine IM administered to the Right deltoid per MD order. Patient tolerated injection well: no redness, bleeding, or bruising noted to injection site. Patient instructed to remain in clinic setting for 15 minutes.

## 2020-01-03 NOTE — PROGRESS NOTES
Subjective:       Patient ID: Calvin Smith is a 54 y.o. male.    Chief Complaint: Annual Exam    HPI    The patient presents to the office today requesting a routine periodic health examination.  He had a TIA 4 days ago (seen at [facility]- ED).  That chart was reviewed.  Echocardiogram and MRI brain unremarkable.    Patient Active Problem List   Diagnosis    TIA (transient ischemic attack)    Dyslipidemia    Insomnia    Steatosis of liver    Colon polyps    Anxiety    MARY JO (obstructive sleep apnea)    Chronic bilateral back pain    [place] cardiac risk <10% in next 10 years       Past Surgical History:   Procedure Laterality Date    APPENDECTOMY  2005    COLONOSCOPY  11/2019         Current Outpatient Medications:     aspirin (ECOTRIN) 81 MG EC tablet, Take 1 tablet (81 mg total) by mouth once daily., Disp: 360 tablet, Rfl: 0    atorvastatin (LIPITOR) 40 MG tablet, Take 1 tablet (40 mg total) by mouth once daily., Disp: 90 tablet, Rfl: 3    fluticasone propionate (FLONASE) 50 mcg/actuation nasal spray, 1 spray by Each Nostril route once daily., Disp: , Rfl:     varicella-zoster gE-AS01B, PF, (SHINGRIX, PF,) 50 mcg/0.5 mL injection, Inject 0.5 mLs into the muscle once. for 1 dose, Disp: 0.5 mL, Rfl: 0    Review of patient's allergies indicates:  No Known Allergies    Family History   Problem Relation Age of Onset    No Known Problems Mother     Leukemia Father     No Known Problems Sister     No Known Problems Brother     No Known Problems Sister     No Known Problems Brother        Social History     Socioeconomic History    Marital status:      Spouse name: Leigh Reese    Number of children: 2    Years of education: Not on file    Highest education level: Not on file   Occupational History    Not on file   Social Needs    Financial resource strain: Not hard at all    Food insecurity:     Worry: Never true     Inability: Never true    Transportation needs:     Medical: No      Non-medical: No   Tobacco Use    Smoking status: Never Smoker    Smokeless tobacco: Never Used   Substance and Sexual Activity    Alcohol use: Not on file    Drug use: Never    Sexual activity: Yes   Lifestyle    Physical activity:     Days per week: 7 days     Minutes per session: 30 min    Stress: Very much   Relationships    Social connections:     Talks on phone: More than three times a week     Gets together: Three times a week     Attends Mormonism service: More than 4 times per year     Active member of club or organization: No     Attends meetings of clubs or organizations: Never     Relationship status:    Other Topics Concern    Not on file   Social History Narrative    He is satisfied with weight.    Rates diet as fair to poor.    He does not drink at least 1/2 gallon water daily.    He drinks 1-2 coffee/tea/caffeine-containing soft drinks daily.    Total sleep time at night is 6-7 hours (poor quality).    He works 40-50 hours per week.    He does wear seat belts.    Hobbies include tennis.       Patient Care Team:  Alberto Kaur Jr., MD as PCP - General (Family Medicine)  Dae Avelar MD as Consulting Physician (Gastroenterology)  Dannie Lopez MD (Otolaryngology)  Dannie Estevez Jr., MD as Consulting Physician (Physical Medicine and Rehabilitation)  Anthony Mcdermott MD as Consulting Physician (Ophthalmology)  Olaf Carrasco MD as Consulting Physician (Orthopedic Surgery)    Review of Systems   Constitutional: Negative for fatigue and unexpected weight change.   HENT: Negative for ear discharge, ear pain, hearing loss, tinnitus and voice change.    Eyes: Negative for pain.   Respiratory: Negative for cough and shortness of breath.    Cardiovascular: Negative for chest pain, palpitations and leg swelling.   Gastrointestinal: Negative for abdominal pain, blood in stool, constipation, diarrhea, nausea and vomiting.   Genitourinary: Positive for frequency (but no nocturia).  "Negative for decreased urine volume, difficulty urinating, dysuria, enuresis, hematuria and urgency.   Musculoskeletal: Positive for back pain. Negative for arthralgias and myalgias.   Skin: Negative for rash.   Neurological: Negative for dizziness, weakness, light-headedness and headaches.   Hematological: Does not bruise/bleed easily.   Psychiatric/Behavioral: Positive for sleep disturbance (poor quality; rx'd CPAP but does not use.). Negative for dysphoric mood. The patient is not nervous/anxious.        Objective:      /60   Pulse 98   Ht 5' 10" (1.778 m)   Wt 79.8 kg (176 lb)   PF 96 L/min   BMI 25.25 kg/m²     Physical Exam      Assessment:       1. Routine general medical examination at a health care facility    2. TIA (transient ischemic attack)    3. Dyslipidemia    4. Steatosis of liver    5. [place] cardiac risk <10% in next 10 years    6. MARY JO (obstructive sleep apnea)    7. Chronic bilateral low back pain without sciatica    8. Polyp of colon, unspecified part of colon, unspecified type    9. Anxiety    10. Need for hepatitis C screening test    11. Laboratory exam ordered as part of routine general medical examination        Plan:       Request previous records.  Update Health Maintenance at that time.  Discussed routine examinations and screenings.   Offered Pneumovax, influenza, shingles and TdaP vaccines.     Orders Placed This Encounter    US Carotid Bilateral    CT Calcium Scoring Cardiac    Pneumococcal Polysaccharide Vaccine (23 Valent) (SQ/IM)    Tdap Vaccine    Comprehensive metabolic panel    CBC auto differential    Lipoprotein Analysis, by NMR    Hepatitis C antibody    varicella-zoster gE-AS01B, PF, (SHINGRIX, PF,) 50 mcg/0.5 mL injection       We will call the patient with results & make further recommendations at that time.      "This note will not be shared with the patient."  "

## 2020-01-07 ENCOUNTER — LAB VISIT (OUTPATIENT)
Dept: LAB | Facility: HOSPITAL | Age: 55
End: 2020-01-07
Attending: FAMILY MEDICINE
Payer: COMMERCIAL

## 2020-01-07 DIAGNOSIS — Z11.59 NEED FOR HEPATITIS C SCREENING TEST: ICD-10-CM

## 2020-01-07 DIAGNOSIS — K76.0 STEATOSIS OF LIVER: ICD-10-CM

## 2020-01-07 DIAGNOSIS — E78.5 DYSLIPIDEMIA: ICD-10-CM

## 2020-01-07 DIAGNOSIS — Z00.00 LABORATORY EXAM ORDERED AS PART OF ROUTINE GENERAL MEDICAL EXAMINATION: ICD-10-CM

## 2020-01-07 DIAGNOSIS — G47.33 OSA (OBSTRUCTIVE SLEEP APNEA): ICD-10-CM

## 2020-01-07 LAB
ALBUMIN SERPL BCP-MCNC: 4.7 G/DL (ref 3.5–5.2)
ALP SERPL-CCNC: 74 U/L (ref 55–135)
ALT SERPL W/O P-5'-P-CCNC: 54 U/L (ref 10–44)
ANION GAP SERPL CALC-SCNC: 9 MMOL/L (ref 8–16)
AST SERPL-CCNC: 39 U/L (ref 10–40)
BASOPHILS # BLD AUTO: 0.04 K/UL (ref 0–0.2)
BASOPHILS NFR BLD: 0.8 % (ref 0–1.9)
BILIRUB SERPL-MCNC: 1 MG/DL (ref 0.1–1)
BUN SERPL-MCNC: 17 MG/DL (ref 6–20)
CALCIUM SERPL-MCNC: 9.8 MG/DL (ref 8.7–10.5)
CHLORIDE SERPL-SCNC: 103 MMOL/L (ref 95–110)
CO2 SERPL-SCNC: 28 MMOL/L (ref 23–29)
CREAT SERPL-MCNC: 1.2 MG/DL (ref 0.5–1.4)
DIFFERENTIAL METHOD: ABNORMAL
EOSINOPHIL # BLD AUTO: 0.1 K/UL (ref 0–0.5)
EOSINOPHIL NFR BLD: 2.1 % (ref 0–8)
ERYTHROCYTE [DISTWIDTH] IN BLOOD BY AUTOMATED COUNT: 12 % (ref 11.5–14.5)
EST. GFR  (AFRICAN AMERICAN): >60 ML/MIN/1.73 M^2
EST. GFR  (NON AFRICAN AMERICAN): >60 ML/MIN/1.73 M^2
GLUCOSE SERPL-MCNC: 93 MG/DL (ref 70–110)
HCT VFR BLD AUTO: 49.1 % (ref 40–54)
HCV AB SERPL QL IA: NEGATIVE
HGB BLD-MCNC: 15.6 G/DL (ref 14–18)
IMM GRANULOCYTES # BLD AUTO: 0.01 K/UL (ref 0–0.04)
IMM GRANULOCYTES NFR BLD AUTO: 0.2 % (ref 0–0.5)
LYMPHOCYTES # BLD AUTO: 1.3 K/UL (ref 1–4.8)
LYMPHOCYTES NFR BLD: 25 % (ref 18–48)
MCH RBC QN AUTO: 28 PG (ref 27–31)
MCHC RBC AUTO-ENTMCNC: 31.8 G/DL (ref 32–36)
MCV RBC AUTO: 88 FL (ref 82–98)
MONOCYTES # BLD AUTO: 0.4 K/UL (ref 0.3–1)
MONOCYTES NFR BLD: 7.5 % (ref 4–15)
NEUTROPHILS # BLD AUTO: 3.4 K/UL (ref 1.8–7.7)
NEUTROPHILS NFR BLD: 64.4 % (ref 38–73)
NRBC BLD-RTO: 0 /100 WBC
PLATELET # BLD AUTO: 322 K/UL (ref 150–350)
PMV BLD AUTO: 10.9 FL (ref 9.2–12.9)
POTASSIUM SERPL-SCNC: 4 MMOL/L (ref 3.5–5.1)
PROT SERPL-MCNC: 8 G/DL (ref 6–8.4)
RBC # BLD AUTO: 5.58 M/UL (ref 4.6–6.2)
SODIUM SERPL-SCNC: 140 MMOL/L (ref 136–145)
WBC # BLD AUTO: 5.33 K/UL (ref 3.9–12.7)

## 2020-01-07 PROCEDURE — 36415 COLL VENOUS BLD VENIPUNCTURE: CPT | Mod: PO

## 2020-01-07 PROCEDURE — 80053 COMPREHEN METABOLIC PANEL: CPT

## 2020-01-07 PROCEDURE — 86803 HEPATITIS C AB TEST: CPT

## 2020-01-07 PROCEDURE — 85025 COMPLETE CBC W/AUTO DIFF WBC: CPT

## 2020-01-07 PROCEDURE — 80061 LIPID PANEL: CPT | Mod: 59

## 2020-01-08 ENCOUNTER — HOSPITAL ENCOUNTER (OUTPATIENT)
Dept: RADIOLOGY | Facility: HOSPITAL | Age: 55
Discharge: HOME | End: 2020-01-08
Attending: FAMILY MEDICINE
Payer: COMMERCIAL

## 2020-01-08 DIAGNOSIS — Z91.89 FRAMINGHAM CARDIAC RISK <10% IN NEXT 10 YEARS: ICD-10-CM

## 2020-01-08 DIAGNOSIS — E78.5 DYSLIPIDEMIA: ICD-10-CM

## 2020-01-08 DIAGNOSIS — G45.9 TIA (TRANSIENT ISCHEMIC ATTACK): ICD-10-CM

## 2020-01-08 PROCEDURE — 75571 CT CALCIUM SCORING CARDIAC: ICD-10-PCS | Mod: 26,,, | Performed by: RADIOLOGY

## 2020-01-08 PROCEDURE — 93880 EXTRACRANIAL BILAT STUDY: CPT | Mod: 26,,, | Performed by: RADIOLOGY

## 2020-01-08 PROCEDURE — 75571 CT HRT W/O DYE W/CA TEST: CPT | Mod: 26,,, | Performed by: RADIOLOGY

## 2020-01-08 PROCEDURE — 75571 CT HRT W/O DYE W/CA TEST: CPT | Mod: TC

## 2020-01-08 PROCEDURE — 93880 EXTRACRANIAL BILAT STUDY: CPT | Mod: TC

## 2020-01-08 PROCEDURE — 93880 US CAROTID BILATERAL: ICD-10-PCS | Mod: 26,,, | Performed by: RADIOLOGY

## 2020-01-11 LAB
CHOLEST SERPL-MCNC: 93 MG/DL
HDL SERPL QN: 8.3 NM
HDL SERPL-SCNC: 22.3 UMOL/L
HDLC SERPL-MCNC: 32 MG/DL (ref 40–59)
HLD.LARGE SERPL-SCNC: <2.8 UMOL/L
LDL SERPL QN: 20.6 NM
LDL SERPL-SCNC: 804 NMOL/L
LDL SMALL SERPL-SCNC: 621 NMOL/L
LDLC SERPL CALC-MCNC: 41 MG/DL
PATHOLOGY STUDY: ABNORMAL
TRIGL SERPL-MCNC: 101 MG/DL (ref 30–149)
VLDL LARGE SERPL-SCNC: <1.5 NMOL/L
VLDL SERPL QN: 48.2 NM

## 2020-01-17 RX ORDER — SERTRALINE HYDROCHLORIDE 25 MG/1
25 TABLET, FILM COATED ORAL DAILY
Qty: 30 TABLET | Refills: 1 | Status: SHIPPED | OUTPATIENT
Start: 2020-01-17 | End: 2020-03-21

## 2020-01-20 ENCOUNTER — PATIENT MESSAGE (OUTPATIENT)
Dept: FAMILY MEDICINE | Facility: CLINIC | Age: 55
End: 2020-01-20

## 2020-01-22 RX ORDER — ALPRAZOLAM 0.25 MG/1
0.25 TABLET ORAL 3 TIMES DAILY PRN
Qty: 10 TABLET | Refills: 0 | Status: SHIPPED | OUTPATIENT
Start: 2020-01-22 | End: 2021-03-24

## 2020-01-28 ENCOUNTER — OFFICE VISIT (OUTPATIENT)
Dept: NEUROLOGY | Facility: CLINIC | Age: 55
End: 2020-01-28
Payer: COMMERCIAL

## 2020-01-28 VITALS
DIASTOLIC BLOOD PRESSURE: 73 MMHG | SYSTOLIC BLOOD PRESSURE: 115 MMHG | WEIGHT: 176 LBS | BODY MASS INDEX: 25.2 KG/M2 | HEART RATE: 81 BPM | HEIGHT: 70 IN

## 2020-01-28 DIAGNOSIS — G45.9 TIA (TRANSIENT ISCHEMIC ATTACK): ICD-10-CM

## 2020-01-28 DIAGNOSIS — F41.9 ANXIETY: ICD-10-CM

## 2020-01-28 PROCEDURE — 3008F PR BODY MASS INDEX (BMI) DOCUMENTED: ICD-10-PCS | Mod: CPTII,S$GLB,, | Performed by: PSYCHIATRY & NEUROLOGY

## 2020-01-28 PROCEDURE — 3008F BODY MASS INDEX DOCD: CPT | Mod: CPTII,S$GLB,, | Performed by: PSYCHIATRY & NEUROLOGY

## 2020-01-28 PROCEDURE — 99999 PR PBB SHADOW E&M-EST. PATIENT-LVL III: ICD-10-PCS | Mod: PBBFAC,,, | Performed by: PSYCHIATRY & NEUROLOGY

## 2020-01-28 PROCEDURE — 99213 PR OFFICE/OUTPT VISIT, EST, LEVL III, 20-29 MIN: ICD-10-PCS | Mod: S$GLB,,, | Performed by: PSYCHIATRY & NEUROLOGY

## 2020-01-28 PROCEDURE — 99213 OFFICE O/P EST LOW 20 MIN: CPT | Mod: S$GLB,,, | Performed by: PSYCHIATRY & NEUROLOGY

## 2020-01-28 PROCEDURE — 99999 PR PBB SHADOW E&M-EST. PATIENT-LVL III: CPT | Mod: PBBFAC,,, | Performed by: PSYCHIATRY & NEUROLOGY

## 2020-01-28 NOTE — PROGRESS NOTES
Subjective:       Patient ID: Calvin Smith is a 54 y.o. male.    Chief Complaint:  No chief complaint on file.      History of Present Illness  Echo - · Normal left ventricular systolic function. The estimated ejection fraction is 65%  · Normal LV diastolic function.  · No wall motion abnormalities.  · Normal right ventricular systolic function.  · Normal central venous pressure (3 mm Hg).     MRI - Negative  Carotid Doppler - (-)    Stroke Follow-up  He presents for follow-up of TIA. Event occurred 1 month ago. He has residual symptoms of None. He denies all symptoms. Overall he feels the condition is completed. Stroke risk factors include none. He also complains of none. He denies chest pain, palpitations, seizures and shortness of breath.       Review of Systems  Review of Systems   All other systems reviewed and are negative.      Objective:      Neurologic Exam     Mental Status   Oriented to person, place, and time.   Level of consciousness: alert  Knowledge: good.   Able to name object. Able to read. Able to repeat. Able to write. Normal comprehension.     Cranial Nerves   Cranial nerves II through XII intact.     Motor Exam   Muscle bulk: normal  Overall muscle tone: normal    Strength   Strength 5/5 throughout.     Sensory Exam   Light touch normal.     Gait, Coordination, and Reflexes     Reflexes   Reflexes 2+ except as noted.       Physical Exam   Constitutional: He is oriented to person, place, and time.   Neurological: He is oriented to person, place, and time. He has normal strength.         Assessment:         Problem List Items Addressed This Visit        Neuro    TIA (transient ischemic attack)    Current Assessment & Plan     Work-up is normal.  Continue ASA and lipitor  RTC prn              Psychiatric    Anxiety    Current Assessment & Plan     Prefer ativan rather than xanax               Plan:     Follow up if symptoms worsen or fail to improve.

## 2020-01-30 RX ORDER — ATORVASTATIN CALCIUM 40 MG/1
40 TABLET, FILM COATED ORAL DAILY
Qty: 90 TABLET | Refills: 3 | Status: CANCELLED | OUTPATIENT
Start: 2020-01-30 | End: 2021-01-29

## 2020-01-30 RX ORDER — ASPIRIN 81 MG/1
81 TABLET ORAL DAILY
Qty: 360 TABLET | Refills: 0 | Status: CANCELLED | OUTPATIENT
Start: 2020-01-30 | End: 2021-01-29

## 2020-01-30 NOTE — TELEPHONE ENCOUNTER
Patient has been on these medications less than a month.  Prefer to wait until repeat lipid profile in late March before refilling.

## 2020-02-04 ENCOUNTER — TELEPHONE (OUTPATIENT)
Dept: FAMILY MEDICINE | Facility: CLINIC | Age: 55
End: 2020-02-04

## 2020-02-04 NOTE — TELEPHONE ENCOUNTER
Attempted to reach the patient to verify if he needed refills on the atorvastatin and low dose aspirin. Our records show where he has refills on the two medications, however we received a new prescription request from [facility] mail order.     Left voicemail requesting a call back.    no discharge, no irritation, no pain, no redness, and no visual changes.

## 2020-02-04 NOTE — TELEPHONE ENCOUNTER
I was informed by Randi REDD that she spoke with the patient in regards to the medication refill. Patient was informed to disregard my call.

## 2020-02-29 PROBLEM — M51.26 HERNIATED LUMBAR INTERVERTEBRAL DISC: Status: ACTIVE | Noted: 2020-02-29

## 2020-02-29 PROBLEM — M48.061 NEUROFORAMINAL STENOSIS OF LUMBAR SPINE: Status: ACTIVE | Noted: 2020-02-29

## 2020-02-29 PROBLEM — M47.816 LUMBAR FACET ARTHROPATHY: Status: ACTIVE | Noted: 2020-02-29

## 2020-03-16 ENCOUNTER — PATIENT MESSAGE (OUTPATIENT)
Dept: FAMILY MEDICINE | Facility: CLINIC | Age: 55
End: 2020-03-16

## 2020-03-17 ENCOUNTER — OFFICE VISIT (OUTPATIENT)
Dept: FAMILY MEDICINE | Facility: CLINIC | Age: 55
End: 2020-03-17
Attending: FAMILY MEDICINE
Payer: COMMERCIAL

## 2020-03-17 VITALS
HEART RATE: 76 BPM | WEIGHT: 171.81 LBS | BODY MASS INDEX: 24.6 KG/M2 | SYSTOLIC BLOOD PRESSURE: 114 MMHG | DIASTOLIC BLOOD PRESSURE: 64 MMHG | HEIGHT: 70 IN | RESPIRATION RATE: 16 BRPM

## 2020-03-17 DIAGNOSIS — E78.5 DYSLIPIDEMIA: ICD-10-CM

## 2020-03-17 DIAGNOSIS — N50.812 LEFT TESTICULAR PAIN: Primary | ICD-10-CM

## 2020-03-17 DIAGNOSIS — G45.9 TIA (TRANSIENT ISCHEMIC ATTACK): ICD-10-CM

## 2020-03-17 PROCEDURE — 3008F BODY MASS INDEX DOCD: CPT | Mod: CPTII,S$GLB,, | Performed by: FAMILY MEDICINE

## 2020-03-17 PROCEDURE — 99999 PR PBB SHADOW E&M-EST. PATIENT-LVL IV: ICD-10-PCS | Mod: PBBFAC,,, | Performed by: FAMILY MEDICINE

## 2020-03-17 PROCEDURE — 90471 IMMUNIZATION ADMIN: CPT | Mod: S$GLB,,, | Performed by: FAMILY MEDICINE

## 2020-03-17 PROCEDURE — 90686 IIV4 VACC NO PRSV 0.5 ML IM: CPT | Mod: S$GLB,,, | Performed by: FAMILY MEDICINE

## 2020-03-17 PROCEDURE — 3008F PR BODY MASS INDEX (BMI) DOCUMENTED: ICD-10-PCS | Mod: CPTII,S$GLB,, | Performed by: FAMILY MEDICINE

## 2020-03-17 PROCEDURE — 99999 PR PBB SHADOW E&M-EST. PATIENT-LVL IV: CPT | Mod: PBBFAC,,, | Performed by: FAMILY MEDICINE

## 2020-03-17 PROCEDURE — 99214 OFFICE O/P EST MOD 30 MIN: CPT | Mod: 25,S$GLB,, | Performed by: FAMILY MEDICINE

## 2020-03-17 PROCEDURE — 90471 FLU VACCINE (QUAD) GREATER THAN OR EQUAL TO 3YO PRESERVATIVE FREE IM: ICD-10-PCS | Mod: S$GLB,,, | Performed by: FAMILY MEDICINE

## 2020-03-17 PROCEDURE — 90686 FLU VACCINE (QUAD) GREATER THAN OR EQUAL TO 3YO PRESERVATIVE FREE IM: ICD-10-PCS | Mod: S$GLB,,, | Performed by: FAMILY MEDICINE

## 2020-03-17 PROCEDURE — 99214 PR OFFICE/OUTPT VISIT, EST, LEVL IV, 30-39 MIN: ICD-10-PCS | Mod: 25,S$GLB,, | Performed by: FAMILY MEDICINE

## 2020-03-17 NOTE — PATIENT INSTRUCTIONS
Calvin,     We are always striving for excellence. Should you receive a patient experience survey electronically or by mail, we would appreciate if you would take a few moments to give us your feedback. These surveys let us know our strengths as well as areas of opportunity for improvement to better serve you.    Thank you for your time,  Ana María Chisholm LPN    Your test results will be communicated to you via : [facility], Telephone or Letter.   If you have not received your test results in one week, please contact the clinic at [phone].

## 2020-03-17 NOTE — PROGRESS NOTES
Subjective:       Patient ID: Calvin Smith is a 55 y.o. male.    Chief Complaint: Testicle Pain    Testicle Pain   The patient's primary symptoms include testicular pain. This is a recurrent problem. The current episode started in the past 7 days. The problem occurs 2 to 4 times per day. The problem has been unchanged. The pain is mild. Pertinent negatives include no chest pain, chills, constipation, diarrhea, fever, frequency, headaches, hematuria, hesitancy, sore throat, urgency, urinary retention or vomiting. There is no reported injury. The testicular pain affects the left testicle. The color of the testicles is normal. Nothing aggravates the symptoms. He has tried nothing for the symptoms. He is sexually active. He never uses condoms. No, his partner does not have an STD.        Patient Active Problem List   Diagnosis    TIA (transient ischemic attack)    Dyslipidemia    Insomnia    Steatosis of liver    Colon polyps    Anxiety    MARY JO (obstructive sleep apnea)    Chronic bilateral back pain    Callery cardiac risk <10% in next 10 years    Herniated lumbar intervertebral disc    Lumbar facet arthropathy    Neuroforaminal stenosis of lumbar spine       Current Outpatient Medications:     ALPRAZolam (XANAX) 0.25 MG tablet, Take 1 tablet (0.25 mg total) by mouth 3 (three) times daily as needed for Anxiety., Disp: 10 tablet, Rfl: 0    aspirin (ECOTRIN) 81 MG EC tablet, Take 1 tablet (81 mg total) by mouth once daily., Disp: 360 tablet, Rfl: 0    atorvastatin (LIPITOR) 40 MG tablet, Take 1 tablet (40 mg total) by mouth once daily., Disp: 90 tablet, Rfl: 3    fluticasone propionate (FLONASE) 50 mcg/actuation nasal spray, 1 spray by Each Nostril route once daily., Disp: , Rfl:     sertraline (ZOLOFT) 25 MG tablet, Take 1 tablet (25 mg total) by mouth once daily., Disp: 30 tablet, Rfl: 1    The following portions of the patient's history were reviewed and updated as appropriate: allergies, past  "family history, past medical history, past social history and past surgical history.    Review of Systems   Constitutional: Negative for activity change, chills, fever and unexpected weight change.   HENT: Negative for hearing loss, rhinorrhea, sore throat and trouble swallowing.    Eyes: Negative for discharge and visual disturbance.   Respiratory: Negative for chest tightness and wheezing.    Cardiovascular: Negative for chest pain and palpitations.   Gastrointestinal: Negative for blood in stool, constipation, diarrhea and vomiting.   Endocrine: Negative for polydipsia and polyuria.   Genitourinary: Positive for testicular pain. Negative for difficulty urinating, frequency, hematuria, hesitancy and urgency.   Musculoskeletal: Negative for arthralgias, joint swelling and neck pain.   Neurological: Negative for weakness and headaches.   Psychiatric/Behavioral: Negative for confusion and dysphoric mood.       Objective:      /64 (BP Location: Right arm, Patient Position: Sitting, BP Method: Large (Automatic))   Pulse 76   Resp 16   Ht 5' 10" (1.778 m)   Wt 77.9 kg (171 lb 12.8 oz)   BMI 24.65 kg/m²     Physical Exam   Constitutional: He is oriented to person, place, and time. He appears well-developed and well-nourished.   Abdominal: Hernia confirmed negative in the right inguinal area and confirmed negative in the left inguinal area.   Genitourinary: Right testis shows no mass and no tenderness. Left testis shows mass and tenderness (minimal). Circumcised.   Genitourinary Comments: Probable varicocele on left.   Neurological: He is alert and oriented to person, place, and time.   Skin: Skin is warm and dry.   Psychiatric: He has a normal mood and affect.   Vitals reviewed.      Assessment:       1. Left testicular pain    2. Dyslipidemia    3. TIA (transient ischemic attack)        Plan:       US.  Scrotal support.  Further recommendations to follow after above.    Labs (see Orders)     Orders Placed " "This Encounter    US Scrotum And Testicles    Influenza - Quadrivalent (PF)    Comprehensive metabolic panel    Lipid panel           "This note will not be shared with the patient."  "

## 2020-03-18 ENCOUNTER — PATIENT MESSAGE (OUTPATIENT)
Dept: FAMILY MEDICINE | Facility: CLINIC | Age: 55
End: 2020-03-18

## 2020-03-18 ENCOUNTER — HOSPITAL ENCOUNTER (OUTPATIENT)
Dept: RADIOLOGY | Facility: HOSPITAL | Age: 55
Discharge: HOME | End: 2020-03-18
Attending: FAMILY MEDICINE
Payer: COMMERCIAL

## 2020-03-18 DIAGNOSIS — N50.812 LEFT TESTICULAR PAIN: ICD-10-CM

## 2020-03-18 PROCEDURE — 76870 US EXAM SCROTUM: CPT | Mod: 26,,, | Performed by: RADIOLOGY

## 2020-03-18 PROCEDURE — 76870 US EXAM SCROTUM: CPT | Mod: TC

## 2020-03-18 PROCEDURE — 76870 US SCROTUM AND TESTICLES: ICD-10-PCS | Mod: 26,,, | Performed by: RADIOLOGY

## 2020-03-19 ENCOUNTER — PATIENT MESSAGE (OUTPATIENT)
Dept: FAMILY MEDICINE | Facility: CLINIC | Age: 55
End: 2020-03-19

## 2020-03-20 ENCOUNTER — PATIENT MESSAGE (OUTPATIENT)
Dept: FAMILY MEDICINE | Facility: CLINIC | Age: 55
End: 2020-03-20

## 2020-03-20 RX ORDER — ATORVASTATIN CALCIUM 20 MG/1
20 TABLET, FILM COATED ORAL DAILY
Qty: 90 TABLET | Refills: 0 | Status: SHIPPED | OUTPATIENT
Start: 2020-03-20 | End: 2020-06-16

## 2020-03-21 RX ORDER — SERTRALINE HYDROCHLORIDE 25 MG/1
TABLET, FILM COATED ORAL
Qty: 30 TABLET | Refills: 1 | Status: SHIPPED | OUTPATIENT
Start: 2020-03-21 | End: 2020-04-15 | Stop reason: SDUPTHER

## 2020-04-15 RX ORDER — SERTRALINE HYDROCHLORIDE 25 MG/1
25 TABLET, FILM COATED ORAL DAILY
Qty: 90 TABLET | Refills: 1 | Status: SHIPPED | OUTPATIENT
Start: 2020-04-15 | End: 2020-06-17 | Stop reason: SDUPTHER

## 2020-04-21 ENCOUNTER — PATIENT MESSAGE (OUTPATIENT)
Dept: FAMILY MEDICINE | Facility: CLINIC | Age: 55
End: 2020-04-21

## 2020-04-22 ENCOUNTER — PATIENT MESSAGE (OUTPATIENT)
Dept: FAMILY MEDICINE | Facility: CLINIC | Age: 55
End: 2020-04-22

## 2020-04-22 RX ORDER — FLUTICASONE PROPIONATE 50 MCG
1 SPRAY, SUSPENSION (ML) NASAL DAILY
Qty: 48 G | Refills: 3 | Status: SHIPPED | OUTPATIENT
Start: 2020-04-22 | End: 2020-04-22 | Stop reason: SDUPTHER

## 2020-04-22 RX ORDER — FLUTICASONE PROPIONATE 50 MCG
1 SPRAY, SUSPENSION (ML) NASAL DAILY
Qty: 48 G | Refills: 3 | Status: SHIPPED | OUTPATIENT
Start: 2020-04-22 | End: 2021-01-20

## 2020-06-09 RX ORDER — SERTRALINE HYDROCHLORIDE 25 MG/1
25 TABLET, FILM COATED ORAL DAILY
Qty: 90 TABLET | Refills: 1 | Status: CANCELLED | OUTPATIENT
Start: 2020-06-09

## 2020-06-10 ENCOUNTER — PATIENT MESSAGE (OUTPATIENT)
Dept: FAMILY MEDICINE | Facility: CLINIC | Age: 55
End: 2020-06-10

## 2020-06-10 RX ORDER — SERTRALINE HYDROCHLORIDE 25 MG/1
25 TABLET, FILM COATED ORAL DAILY
Qty: 90 TABLET | Refills: 1 | Status: CANCELLED | OUTPATIENT
Start: 2020-06-10

## 2020-06-16 RX ORDER — ATORVASTATIN CALCIUM 20 MG/1
TABLET, FILM COATED ORAL
Qty: 90 TABLET | Refills: 0 | Status: SHIPPED | OUTPATIENT
Start: 2020-06-16 | End: 2020-11-02

## 2020-06-17 RX ORDER — SERTRALINE HYDROCHLORIDE 25 MG/1
25 TABLET, FILM COATED ORAL DAILY
Qty: 90 TABLET | Refills: 1 | Status: SHIPPED | OUTPATIENT
Start: 2020-06-17 | End: 2020-12-02

## 2020-09-28 ENCOUNTER — PATIENT MESSAGE (OUTPATIENT)
Dept: FAMILY MEDICINE | Facility: CLINIC | Age: 55
End: 2020-09-28

## 2020-09-28 RX ORDER — ASPIRIN 81 MG/1
81 TABLET ORAL DAILY
Qty: 90 TABLET | Refills: 3 | COMMUNITY
Start: 2020-09-28

## 2020-11-10 RX ORDER — ATORVASTATIN CALCIUM 20 MG/1
20 TABLET, FILM COATED ORAL DAILY
Qty: 90 TABLET | Refills: 0 | Status: CANCELLED | OUTPATIENT
Start: 2020-11-10

## 2021-03-05 ENCOUNTER — IMMUNIZATION (OUTPATIENT)
Dept: PRIMARY CARE CLINIC | Facility: CLINIC | Age: 56
End: 2021-03-05
Payer: COMMERCIAL

## 2021-03-05 DIAGNOSIS — Z23 NEED FOR VACCINATION: Primary | ICD-10-CM

## 2021-03-05 PROCEDURE — 91303 PR SARSCOV2 VAC AD26 .5ML IM: ICD-10-PCS | Mod: S$GLB,,, | Performed by: INTERNAL MEDICINE

## 2021-03-05 PROCEDURE — 91303 PR SARSCOV2 VAC AD26 .5ML IM: CPT | Mod: S$GLB,,, | Performed by: INTERNAL MEDICINE

## 2021-03-05 PROCEDURE — 0031A PR IMMUNIZ ADMIN, SARS-COV-2 COVID-19 VACC, 5X10VP/0.5ML: CPT | Mod: CV19,S$GLB,, | Performed by: INTERNAL MEDICINE

## 2021-03-05 PROCEDURE — 0031A PR IMMUNIZ ADMIN, SARS-COV-2 COVID-19 VACC, 5X10VP/0.5ML: ICD-10-PCS | Mod: CV19,S$GLB,, | Performed by: INTERNAL MEDICINE

## 2021-03-22 PROBLEM — Z86.73 HISTORY OF TRANSIENT ISCHEMIC ATTACK (TIA): Status: ACTIVE | Noted: 2019-12-31

## 2021-03-24 ENCOUNTER — PATIENT OUTREACH (OUTPATIENT)
Dept: ADMINISTRATIVE | Facility: OTHER | Age: 56
End: 2021-03-24

## 2021-03-24 ENCOUNTER — OFFICE VISIT (OUTPATIENT)
Dept: FAMILY MEDICINE | Facility: CLINIC | Age: 56
End: 2021-03-24
Attending: FAMILY MEDICINE
Payer: COMMERCIAL

## 2021-03-24 VITALS
DIASTOLIC BLOOD PRESSURE: 72 MMHG | BODY MASS INDEX: 26.27 KG/M2 | OXYGEN SATURATION: 95 % | HEART RATE: 81 BPM | HEIGHT: 70 IN | SYSTOLIC BLOOD PRESSURE: 100 MMHG | WEIGHT: 183.5 LBS

## 2021-03-24 DIAGNOSIS — G47.33 OSA (OBSTRUCTIVE SLEEP APNEA): ICD-10-CM

## 2021-03-24 DIAGNOSIS — Z12.5 PROSTATE CANCER SCREENING: ICD-10-CM

## 2021-03-24 DIAGNOSIS — K63.5 POLYP OF COLON, UNSPECIFIED PART OF COLON, UNSPECIFIED TYPE: ICD-10-CM

## 2021-03-24 DIAGNOSIS — E78.5 DYSLIPIDEMIA: ICD-10-CM

## 2021-03-24 DIAGNOSIS — Z91.89 FRAMINGHAM CARDIAC RISK <10% IN NEXT 10 YEARS: ICD-10-CM

## 2021-03-24 DIAGNOSIS — Z86.73 HISTORY OF TRANSIENT ISCHEMIC ATTACK (TIA): ICD-10-CM

## 2021-03-24 DIAGNOSIS — Z00.00 ROUTINE GENERAL MEDICAL EXAMINATION AT A HEALTH CARE FACILITY: Primary | ICD-10-CM

## 2021-03-24 DIAGNOSIS — G47.00 INSOMNIA, UNSPECIFIED TYPE: ICD-10-CM

## 2021-03-24 DIAGNOSIS — M51.26 HERNIATED LUMBAR INTERVERTEBRAL DISC: ICD-10-CM

## 2021-03-24 DIAGNOSIS — F41.9 ANXIETY: ICD-10-CM

## 2021-03-24 DIAGNOSIS — G89.29 CHRONIC BILATERAL LOW BACK PAIN WITHOUT SCIATICA: ICD-10-CM

## 2021-03-24 DIAGNOSIS — M47.816 LUMBAR FACET ARTHROPATHY: ICD-10-CM

## 2021-03-24 DIAGNOSIS — K76.0 STEATOSIS OF LIVER: ICD-10-CM

## 2021-03-24 DIAGNOSIS — M54.50 CHRONIC BILATERAL LOW BACK PAIN WITHOUT SCIATICA: ICD-10-CM

## 2021-03-24 PROCEDURE — 99396 PREV VISIT EST AGE 40-64: CPT | Mod: S$GLB,,, | Performed by: FAMILY MEDICINE

## 2021-03-24 PROCEDURE — 3008F PR BODY MASS INDEX (BMI) DOCUMENTED: ICD-10-PCS | Mod: CPTII,S$GLB,, | Performed by: FAMILY MEDICINE

## 2021-03-24 PROCEDURE — 99999 PR PBB SHADOW E&M-EST. PATIENT-LVL III: ICD-10-PCS | Mod: PBBFAC,,, | Performed by: FAMILY MEDICINE

## 2021-03-24 PROCEDURE — 1126F AMNT PAIN NOTED NONE PRSNT: CPT | Mod: S$GLB,,, | Performed by: FAMILY MEDICINE

## 2021-03-24 PROCEDURE — 3008F BODY MASS INDEX DOCD: CPT | Mod: CPTII,S$GLB,, | Performed by: FAMILY MEDICINE

## 2021-03-24 PROCEDURE — 99396 PR PREVENTIVE VISIT,EST,40-64: ICD-10-PCS | Mod: S$GLB,,, | Performed by: FAMILY MEDICINE

## 2021-03-24 PROCEDURE — 1126F PR PAIN SEVERITY QUANTIFIED, NO PAIN PRESENT: ICD-10-PCS | Mod: S$GLB,,, | Performed by: FAMILY MEDICINE

## 2021-03-24 PROCEDURE — 99999 PR PBB SHADOW E&M-EST. PATIENT-LVL III: CPT | Mod: PBBFAC,,, | Performed by: FAMILY MEDICINE

## 2021-03-25 ENCOUNTER — LAB VISIT (OUTPATIENT)
Dept: LAB | Facility: HOSPITAL | Age: 56
End: 2021-03-25
Attending: FAMILY MEDICINE
Payer: COMMERCIAL

## 2021-03-25 ENCOUNTER — OFFICE VISIT (OUTPATIENT)
Dept: SLEEP MEDICINE | Facility: CLINIC | Age: 56
End: 2021-03-25
Attending: FAMILY MEDICINE
Payer: COMMERCIAL

## 2021-03-25 VITALS
WEIGHT: 183.19 LBS | DIASTOLIC BLOOD PRESSURE: 78 MMHG | HEART RATE: 76 BPM | SYSTOLIC BLOOD PRESSURE: 112 MMHG | HEIGHT: 70 IN | BODY MASS INDEX: 26.22 KG/M2

## 2021-03-25 DIAGNOSIS — K76.0 STEATOSIS OF LIVER: ICD-10-CM

## 2021-03-25 DIAGNOSIS — Z12.5 PROSTATE CANCER SCREENING: ICD-10-CM

## 2021-03-25 DIAGNOSIS — E78.5 DYSLIPIDEMIA: ICD-10-CM

## 2021-03-25 DIAGNOSIS — Z86.73 HISTORY OF TRANSIENT ISCHEMIC ATTACK (TIA): ICD-10-CM

## 2021-03-25 DIAGNOSIS — G47.00 INSOMNIA, UNSPECIFIED TYPE: ICD-10-CM

## 2021-03-25 DIAGNOSIS — G47.33 OSA (OBSTRUCTIVE SLEEP APNEA): ICD-10-CM

## 2021-03-25 LAB
ALBUMIN SERPL BCP-MCNC: 4.2 G/DL (ref 3.5–5.2)
ALP SERPL-CCNC: 69 U/L (ref 55–135)
ALT SERPL W/O P-5'-P-CCNC: 52 U/L (ref 10–44)
ANION GAP SERPL CALC-SCNC: 9 MMOL/L (ref 8–16)
AST SERPL-CCNC: 30 U/L (ref 10–40)
BILIRUB SERPL-MCNC: 0.9 MG/DL (ref 0.1–1)
BUN SERPL-MCNC: 20 MG/DL (ref 6–20)
CALCIUM SERPL-MCNC: 9.1 MG/DL (ref 8.7–10.5)
CHLORIDE SERPL-SCNC: 106 MMOL/L (ref 95–110)
CHOLEST SERPL-MCNC: 99 MG/DL (ref 120–199)
CHOLEST/HDLC SERPL: 2.9 {RATIO} (ref 2–5)
CO2 SERPL-SCNC: 26 MMOL/L (ref 23–29)
COMPLEXED PSA SERPL-MCNC: 1.3 NG/ML (ref 0–4)
CREAT SERPL-MCNC: 1.1 MG/DL (ref 0.5–1.4)
EST. GFR  (AFRICAN AMERICAN): >60 ML/MIN/1.73 M^2
EST. GFR  (NON AFRICAN AMERICAN): >60 ML/MIN/1.73 M^2
GLUCOSE SERPL-MCNC: 104 MG/DL (ref 70–110)
HDLC SERPL-MCNC: 34 MG/DL (ref 40–75)
HDLC SERPL: 34.3 % (ref 20–50)
LDLC SERPL CALC-MCNC: 45.4 MG/DL (ref 63–159)
NONHDLC SERPL-MCNC: 65 MG/DL
POTASSIUM SERPL-SCNC: 4.2 MMOL/L (ref 3.5–5.1)
PROT SERPL-MCNC: 7.3 G/DL (ref 6–8.4)
SODIUM SERPL-SCNC: 141 MMOL/L (ref 136–145)
TRIGL SERPL-MCNC: 98 MG/DL (ref 30–150)

## 2021-03-25 PROCEDURE — 36415 COLL VENOUS BLD VENIPUNCTURE: CPT | Mod: PO | Performed by: FAMILY MEDICINE

## 2021-03-25 PROCEDURE — 1126F AMNT PAIN NOTED NONE PRSNT: CPT | Mod: S$GLB,,, | Performed by: INTERNAL MEDICINE

## 2021-03-25 PROCEDURE — 3008F PR BODY MASS INDEX (BMI) DOCUMENTED: ICD-10-PCS | Mod: CPTII,S$GLB,, | Performed by: INTERNAL MEDICINE

## 2021-03-25 PROCEDURE — 3008F BODY MASS INDEX DOCD: CPT | Mod: CPTII,S$GLB,, | Performed by: INTERNAL MEDICINE

## 2021-03-25 PROCEDURE — 80061 LIPID PANEL: CPT | Performed by: FAMILY MEDICINE

## 2021-03-25 PROCEDURE — 99999 PR PBB SHADOW E&M-EST. PATIENT-LVL III: CPT | Mod: PBBFAC,,, | Performed by: INTERNAL MEDICINE

## 2021-03-25 PROCEDURE — 80053 COMPREHEN METABOLIC PANEL: CPT | Performed by: FAMILY MEDICINE

## 2021-03-25 PROCEDURE — 1126F PR PAIN SEVERITY QUANTIFIED, NO PAIN PRESENT: ICD-10-PCS | Mod: S$GLB,,, | Performed by: INTERNAL MEDICINE

## 2021-03-25 PROCEDURE — 99999 PR PBB SHADOW E&M-EST. PATIENT-LVL III: ICD-10-PCS | Mod: PBBFAC,,, | Performed by: INTERNAL MEDICINE

## 2021-03-25 PROCEDURE — 99204 OFFICE O/P NEW MOD 45 MIN: CPT | Mod: S$GLB,,, | Performed by: INTERNAL MEDICINE

## 2021-03-25 PROCEDURE — 84153 ASSAY OF PSA TOTAL: CPT | Performed by: FAMILY MEDICINE

## 2021-03-25 PROCEDURE — 99204 PR OFFICE/OUTPT VISIT, NEW, LEVL IV, 45-59 MIN: ICD-10-PCS | Mod: S$GLB,,, | Performed by: INTERNAL MEDICINE

## 2021-03-26 ENCOUNTER — PATIENT MESSAGE (OUTPATIENT)
Dept: FAMILY MEDICINE | Facility: CLINIC | Age: 56
End: 2021-03-26

## 2021-06-11 RX ORDER — SERTRALINE HYDROCHLORIDE 25 MG/1
25 TABLET, FILM COATED ORAL DAILY
Qty: 90 TABLET | Refills: 0 | Status: SHIPPED | OUTPATIENT
Start: 2021-06-11 | End: 2021-08-31

## 2021-07-15 ENCOUNTER — PATIENT MESSAGE (OUTPATIENT)
Dept: INTERNAL MEDICINE | Facility: CLINIC | Age: 56
End: 2021-07-15

## 2021-11-09 ENCOUNTER — IMMUNIZATION (OUTPATIENT)
Dept: INTERNAL MEDICINE | Facility: CLINIC | Age: 56
End: 2021-11-09
Payer: COMMERCIAL

## 2021-11-09 DIAGNOSIS — Z23 NEED FOR VACCINATION: Primary | ICD-10-CM

## 2021-11-09 PROCEDURE — 0004A COVID-19, MRNA, LNP-S, PF, 30 MCG/0.3 ML DOSE VACCINE: CPT | Mod: CV19,PBBFAC | Performed by: INTERNAL MEDICINE

## 2021-11-29 ENCOUNTER — PATIENT MESSAGE (OUTPATIENT)
Dept: INTERNAL MEDICINE | Facility: CLINIC | Age: 56
End: 2021-11-29
Payer: COMMERCIAL

## 2021-11-29 RX ORDER — CEFDINIR 300 MG/1
300 CAPSULE ORAL 2 TIMES DAILY
COMMUNITY
Start: 2021-06-23 | End: 2023-09-06

## 2021-11-29 RX ORDER — BROMPHENIRAMINE MALEATE, PSEUDOEPHEDRINE HYDROCHLORIDE, AND DEXTROMETHORPHAN HYDROBROMIDE 2; 30; 10 MG/5ML; MG/5ML; MG/5ML
5 SYRUP ORAL EVERY 6 HOURS
Status: ON HOLD | COMMUNITY
Start: 2021-06-23 | End: 2023-09-07 | Stop reason: HOSPADM

## 2021-11-29 RX ORDER — SERTRALINE HYDROCHLORIDE 25 MG/1
25 TABLET, FILM COATED ORAL DAILY
Qty: 90 TABLET | Refills: 0 | Status: SHIPPED | OUTPATIENT
Start: 2021-11-29 | End: 2022-01-04 | Stop reason: SDUPTHER

## 2021-12-08 ENCOUNTER — PATIENT MESSAGE (OUTPATIENT)
Dept: INTERNAL MEDICINE | Facility: CLINIC | Age: 56
End: 2021-12-08
Payer: COMMERCIAL

## 2021-12-08 RX ORDER — ASPIRIN 81 MG/1
81 TABLET ORAL DAILY
Qty: 360 TABLET | Refills: 0 | OUTPATIENT
Start: 2021-12-08 | End: 2022-12-08

## 2022-01-04 ENCOUNTER — OFFICE VISIT (OUTPATIENT)
Dept: INTERNAL MEDICINE | Facility: CLINIC | Age: 57
End: 2022-01-04
Payer: COMMERCIAL

## 2022-01-04 VITALS
WEIGHT: 183.88 LBS | OXYGEN SATURATION: 98 % | TEMPERATURE: 98 F | DIASTOLIC BLOOD PRESSURE: 81 MMHG | SYSTOLIC BLOOD PRESSURE: 121 MMHG | BODY MASS INDEX: 26.33 KG/M2 | HEART RATE: 78 BPM | HEIGHT: 70 IN

## 2022-01-04 DIAGNOSIS — Z12.5 PROSTATE CANCER SCREENING: ICD-10-CM

## 2022-01-04 DIAGNOSIS — Z23 NEED FOR PROPHYLACTIC VACCINATION AND INOCULATION AGAINST INFLUENZA: ICD-10-CM

## 2022-01-04 DIAGNOSIS — Z91.89 FRAMINGHAM CARDIAC RISK <10% IN NEXT 10 YEARS: ICD-10-CM

## 2022-01-04 DIAGNOSIS — E78.5 DYSLIPIDEMIA: ICD-10-CM

## 2022-01-04 DIAGNOSIS — M54.50 CHRONIC BILATERAL LOW BACK PAIN WITHOUT SCIATICA: ICD-10-CM

## 2022-01-04 DIAGNOSIS — G47.00 INSOMNIA, UNSPECIFIED TYPE: ICD-10-CM

## 2022-01-04 DIAGNOSIS — Z86.73 HISTORY OF TRANSIENT ISCHEMIC ATTACK (TIA): ICD-10-CM

## 2022-01-04 DIAGNOSIS — F41.9 ANXIETY: ICD-10-CM

## 2022-01-04 DIAGNOSIS — G89.29 CHRONIC BILATERAL LOW BACK PAIN WITHOUT SCIATICA: ICD-10-CM

## 2022-01-04 DIAGNOSIS — Z00.00 WELL ADULT EXAM: Primary | ICD-10-CM

## 2022-01-04 DIAGNOSIS — G47.33 OSA (OBSTRUCTIVE SLEEP APNEA): ICD-10-CM

## 2022-01-04 PROCEDURE — 99396 PR PREVENTIVE VISIT,EST,40-64: ICD-10-PCS | Mod: 25,S$GLB,, | Performed by: FAMILY MEDICINE

## 2022-01-04 PROCEDURE — 90471 IMMUNIZATION ADMIN: CPT | Mod: S$GLB,,, | Performed by: FAMILY MEDICINE

## 2022-01-04 PROCEDURE — 3079F PR MOST RECENT DIASTOLIC BLOOD PRESSURE 80-89 MM HG: ICD-10-PCS | Mod: CPTII,S$GLB,, | Performed by: FAMILY MEDICINE

## 2022-01-04 PROCEDURE — 3008F BODY MASS INDEX DOCD: CPT | Mod: CPTII,S$GLB,, | Performed by: FAMILY MEDICINE

## 2022-01-04 PROCEDURE — 90686 FLU VACCINE (QUAD) GREATER THAN OR EQUAL TO 3YO PRESERVATIVE FREE IM: ICD-10-PCS | Mod: S$GLB,,, | Performed by: FAMILY MEDICINE

## 2022-01-04 PROCEDURE — 3079F DIAST BP 80-89 MM HG: CPT | Mod: CPTII,S$GLB,, | Performed by: FAMILY MEDICINE

## 2022-01-04 PROCEDURE — 3008F PR BODY MASS INDEX (BMI) DOCUMENTED: ICD-10-PCS | Mod: CPTII,S$GLB,, | Performed by: FAMILY MEDICINE

## 2022-01-04 PROCEDURE — 99999 PR PBB SHADOW E&M-EST. PATIENT-LVL III: CPT | Mod: PBBFAC,,, | Performed by: FAMILY MEDICINE

## 2022-01-04 PROCEDURE — 99396 PREV VISIT EST AGE 40-64: CPT | Mod: 25,S$GLB,, | Performed by: FAMILY MEDICINE

## 2022-01-04 PROCEDURE — 99999 PR PBB SHADOW E&M-EST. PATIENT-LVL III: ICD-10-PCS | Mod: PBBFAC,,, | Performed by: FAMILY MEDICINE

## 2022-01-04 PROCEDURE — 3074F SYST BP LT 130 MM HG: CPT | Mod: CPTII,S$GLB,, | Performed by: FAMILY MEDICINE

## 2022-01-04 PROCEDURE — 1159F PR MEDICATION LIST DOCUMENTED IN MEDICAL RECORD: ICD-10-PCS | Mod: CPTII,S$GLB,, | Performed by: FAMILY MEDICINE

## 2022-01-04 PROCEDURE — 90686 IIV4 VACC NO PRSV 0.5 ML IM: CPT | Mod: S$GLB,,, | Performed by: FAMILY MEDICINE

## 2022-01-04 PROCEDURE — 3074F PR MOST RECENT SYSTOLIC BLOOD PRESSURE < 130 MM HG: ICD-10-PCS | Mod: CPTII,S$GLB,, | Performed by: FAMILY MEDICINE

## 2022-01-04 PROCEDURE — 1160F PR REVIEW ALL MEDS BY PRESCRIBER/CLIN PHARMACIST DOCUMENTED: ICD-10-PCS | Mod: CPTII,S$GLB,, | Performed by: FAMILY MEDICINE

## 2022-01-04 PROCEDURE — 1160F RVW MEDS BY RX/DR IN RCRD: CPT | Mod: CPTII,S$GLB,, | Performed by: FAMILY MEDICINE

## 2022-01-04 PROCEDURE — 1159F MED LIST DOCD IN RCRD: CPT | Mod: CPTII,S$GLB,, | Performed by: FAMILY MEDICINE

## 2022-01-04 PROCEDURE — 90471 FLU VACCINE (QUAD) GREATER THAN OR EQUAL TO 3YO PRESERVATIVE FREE IM: ICD-10-PCS | Mod: S$GLB,,, | Performed by: FAMILY MEDICINE

## 2022-01-04 RX ORDER — ATORVASTATIN CALCIUM 10 MG/1
10 TABLET, FILM COATED ORAL DAILY
Qty: 90 TABLET | Refills: 3 | Status: SHIPPED | OUTPATIENT
Start: 2022-01-04 | End: 2022-12-19

## 2022-01-04 RX ORDER — SERTRALINE HYDROCHLORIDE 25 MG/1
25 TABLET, FILM COATED ORAL DAILY
Qty: 90 TABLET | Refills: 3 | Status: SHIPPED | OUTPATIENT
Start: 2022-01-04 | End: 2023-01-24 | Stop reason: SDUPTHER

## 2022-01-04 RX ORDER — FLUTICASONE PROPIONATE 50 MCG
SPRAY, SUSPENSION (ML) NASAL
Qty: 48 G | Refills: 3 | Status: SHIPPED | OUTPATIENT
Start: 2022-01-04 | End: 2023-01-17

## 2022-01-04 NOTE — PROGRESS NOTES
Subjective:       Patient ID: Calvin Smith is a 56 y.o. male.    Chief Complaint: Establish Care    HPI 56-year-old white male former patient of Dr. Kaur presents to clinic today to establish care.  He reports a previous TIA many years ago and at this time he remains stable on aspirin 81 mg daily and Lipitor 10 mg daily.  He has been treated for chronic back pain in the past and has received epidural spinal injections in the past.  At this time she remains stable.  Anxiety is stable on Zoloft 25 mg daily.  He also notes chronic difficulty sleeping which he reports has been ongoing for approximately 20 years.  He does report previously seeing Sleep Medicine but notes intolerance to CPAP.  He continues to note difficulty sleeping but has not followed up with Sleep Medicine in approximately 1 year.  He reports a past surgical history of appendectomy, epidural spinal injections, and previous colonoscopy.  He reports a family history of his father having leukemia and mother hypothyroidism.  He is up-to-date with all vaccinations and flu vaccine will be given today.  He is up-to-date with colonoscopy.  Review of Systems   Constitutional: Negative for appetite change, chills, fatigue and fever.   HENT: Negative for nasal congestion, ear pain, hearing loss, postnasal drip, rhinorrhea, sinus pressure/congestion, sore throat and tinnitus.    Eyes: Negative for redness, itching and visual disturbance.   Respiratory: Negative for cough, chest tightness and shortness of breath.    Cardiovascular: Negative for chest pain and palpitations.   Gastrointestinal: Negative for abdominal pain, constipation, diarrhea, nausea and vomiting.   Genitourinary: Negative for decreased urine volume, difficulty urinating, dysuria, frequency, hematuria and urgency.   Musculoskeletal: Negative for back pain, myalgias, neck pain and neck stiffness.   Integumentary:  Negative for rash.   Neurological: Negative for dizziness, light-headedness and  headaches.   Psychiatric/Behavioral: Positive for sleep disturbance.         Objective:      Physical Exam  Vitals and nursing note reviewed.   Constitutional:       General: He is not in acute distress.     Appearance: He is well-developed and well-nourished. He is not diaphoretic.   HENT:      Head: Normocephalic and atraumatic.      Right Ear: External ear normal.      Left Ear: External ear normal.      Nose: Nose normal.      Mouth/Throat:      Mouth: Oropharynx is clear and moist.      Pharynx: No oropharyngeal exudate.   Eyes:      General: No scleral icterus.        Right eye: No discharge.         Left eye: No discharge.      Extraocular Movements: EOM normal.      Conjunctiva/sclera: Conjunctivae normal.      Pupils: Pupils are equal, round, and reactive to light.   Neck:      Thyroid: No thyromegaly.      Vascular: No JVD.      Trachea: No tracheal deviation.   Cardiovascular:      Rate and Rhythm: Normal rate and regular rhythm.      Pulses: Intact distal pulses.      Heart sounds: Normal heart sounds. No murmur heard.  No friction rub. No gallop.    Pulmonary:      Effort: Pulmonary effort is normal. No respiratory distress.      Breath sounds: Normal breath sounds. No stridor. No wheezing or rales.   Abdominal:      General: Bowel sounds are normal. There is no distension.      Palpations: Abdomen is soft. There is no mass.      Tenderness: There is no abdominal tenderness. There is no guarding or rebound.   Musculoskeletal:         General: No tenderness or edema. Normal range of motion.      Cervical back: Normal range of motion and neck supple.   Lymphadenopathy:      Cervical: No cervical adenopathy.   Skin:     General: Skin is warm and dry.      Coloration: Skin is not pale.      Findings: No erythema or rash.   Neurological:      Mental Status: He is alert and oriented to person, place, and time.   Psychiatric:         Mood and Affect: Mood and affect normal.         Behavior: Behavior normal.          Thought Content: Thought content normal.         Judgment: Judgment normal.         Assessment:       Problem List Items Addressed This Visit     Anxiety    Chronic bilateral back pain    Dyslipidemia    Corinne cardiac risk <10% in next 10 years    History of transient ischemic attack (TIA)    Insomnia    Relevant Orders    Ambulatory referral/consult to Sleep Disorders    MARY JO (obstructive sleep apnea)    Relevant Orders    Ambulatory referral/consult to Sleep Disorders      Other Visit Diagnoses     Well adult exam    -  Primary    Relevant Orders    CBC Auto Differential    Comprehensive Metabolic Panel    Lipid Panel    T4, Free    TSH    Urinalysis    Hemoglobin A1C    PSA, Screening    Prostate cancer screening        Relevant Orders    PSA, Screening    Need for prophylactic vaccination and inoculation against influenza              Plan:       1. CBC, CMP, UA, TSH, free T4, fasting lipids, hemoglobin A1c, and PSA.  2. Continue Lipitor 10 mg daily and aspirin 81 mg daily.  Patient has a previous history of TIA and currently remains stable.  3. Continue Lipitor 10 mg daily.  Dyslipidemia stable.  4. Continue Zoloft 25 mg daily.  Anxiety is stable.  5. Continue follow-up with pain management as needed.  Chronic back pain is stable.  6. Refer to sleep medicine for further evaluation of sleep apnea and insomnia.  7. Flu vaccine given.  8. Return to clinic as needed or in 1 year for annual physical exam.

## 2022-01-05 ENCOUNTER — LAB VISIT (OUTPATIENT)
Dept: LAB | Facility: HOSPITAL | Age: 57
End: 2022-01-05
Attending: FAMILY MEDICINE
Payer: COMMERCIAL

## 2022-01-05 DIAGNOSIS — Z00.00 WELL ADULT EXAM: ICD-10-CM

## 2022-01-05 DIAGNOSIS — Z12.5 PROSTATE CANCER SCREENING: ICD-10-CM

## 2022-01-05 LAB
ALBUMIN SERPL BCP-MCNC: 4.2 G/DL (ref 3.5–5.2)
ALP SERPL-CCNC: 75 U/L (ref 55–135)
ALT SERPL W/O P-5'-P-CCNC: 69 U/L (ref 10–44)
ANION GAP SERPL CALC-SCNC: 8 MMOL/L (ref 8–16)
AST SERPL-CCNC: 37 U/L (ref 10–40)
BILIRUB SERPL-MCNC: 1.1 MG/DL (ref 0.1–1)
BUN SERPL-MCNC: 16 MG/DL (ref 6–20)
CALCIUM SERPL-MCNC: 9.4 MG/DL (ref 8.7–10.5)
CHLORIDE SERPL-SCNC: 103 MMOL/L (ref 95–110)
CHOLEST SERPL-MCNC: 123 MG/DL (ref 120–199)
CHOLEST/HDLC SERPL: 4.1 {RATIO} (ref 2–5)
CO2 SERPL-SCNC: 27 MMOL/L (ref 23–29)
COMPLEXED PSA SERPL-MCNC: 1.5 NG/ML (ref 0–4)
CREAT SERPL-MCNC: 1 MG/DL (ref 0.5–1.4)
EST. GFR  (AFRICAN AMERICAN): >60 ML/MIN/1.73 M^2
EST. GFR  (NON AFRICAN AMERICAN): >60 ML/MIN/1.73 M^2
GLUCOSE SERPL-MCNC: 104 MG/DL (ref 70–110)
HDLC SERPL-MCNC: 30 MG/DL (ref 40–75)
HDLC SERPL: 24.4 % (ref 20–50)
LDLC SERPL CALC-MCNC: 65 MG/DL (ref 63–159)
NONHDLC SERPL-MCNC: 93 MG/DL
POTASSIUM SERPL-SCNC: 4.4 MMOL/L (ref 3.5–5.1)
PROT SERPL-MCNC: 7.3 G/DL (ref 6–8.4)
SODIUM SERPL-SCNC: 138 MMOL/L (ref 136–145)
T4 FREE SERPL-MCNC: 0.84 NG/DL (ref 0.71–1.51)
TRIGL SERPL-MCNC: 140 MG/DL (ref 30–150)
TSH SERPL DL<=0.005 MIU/L-ACNC: 2.69 UIU/ML (ref 0.4–4)

## 2022-01-05 PROCEDURE — 80053 COMPREHEN METABOLIC PANEL: CPT | Performed by: FAMILY MEDICINE

## 2022-01-05 PROCEDURE — 85025 COMPLETE CBC W/AUTO DIFF WBC: CPT | Performed by: FAMILY MEDICINE

## 2022-01-05 PROCEDURE — 36415 COLL VENOUS BLD VENIPUNCTURE: CPT | Mod: PO | Performed by: FAMILY MEDICINE

## 2022-01-05 PROCEDURE — 84439 ASSAY OF FREE THYROXINE: CPT | Performed by: FAMILY MEDICINE

## 2022-01-05 PROCEDURE — 84443 ASSAY THYROID STIM HORMONE: CPT | Performed by: FAMILY MEDICINE

## 2022-01-05 PROCEDURE — 80061 LIPID PANEL: CPT | Performed by: FAMILY MEDICINE

## 2022-01-05 PROCEDURE — 83036 HEMOGLOBIN GLYCOSYLATED A1C: CPT | Performed by: FAMILY MEDICINE

## 2022-01-05 PROCEDURE — 84153 ASSAY OF PSA TOTAL: CPT | Performed by: FAMILY MEDICINE

## 2022-01-06 ENCOUNTER — PATIENT OUTREACH (OUTPATIENT)
Dept: ADMINISTRATIVE | Facility: OTHER | Age: 57
End: 2022-01-06
Payer: COMMERCIAL

## 2022-01-06 ENCOUNTER — OFFICE VISIT (OUTPATIENT)
Dept: SLEEP MEDICINE | Facility: CLINIC | Age: 57
End: 2022-01-06
Payer: COMMERCIAL

## 2022-01-06 VITALS
DIASTOLIC BLOOD PRESSURE: 73 MMHG | WEIGHT: 184.06 LBS | SYSTOLIC BLOOD PRESSURE: 106 MMHG | HEART RATE: 72 BPM | BODY MASS INDEX: 26.41 KG/M2

## 2022-01-06 DIAGNOSIS — F51.09 OTHER INSOMNIA NOT DUE TO A SUBSTANCE OR KNOWN PHYSIOLOGICAL CONDITION: Primary | ICD-10-CM

## 2022-01-06 DIAGNOSIS — G47.00 INSOMNIA, UNSPECIFIED TYPE: ICD-10-CM

## 2022-01-06 DIAGNOSIS — R06.83 SNORING: ICD-10-CM

## 2022-01-06 DIAGNOSIS — G47.33 OSA (OBSTRUCTIVE SLEEP APNEA): ICD-10-CM

## 2022-01-06 LAB
BASOPHILS # BLD AUTO: 0.04 K/UL (ref 0–0.2)
BASOPHILS NFR BLD: 0.6 % (ref 0–1.9)
DIFFERENTIAL METHOD: ABNORMAL
EOSINOPHIL # BLD AUTO: 0.2 K/UL (ref 0–0.5)
EOSINOPHIL NFR BLD: 3.2 % (ref 0–8)
ERYTHROCYTE [DISTWIDTH] IN BLOOD BY AUTOMATED COUNT: 12.9 % (ref 11.5–14.5)
ESTIMATED AVG GLUCOSE: 126 MG/DL (ref 68–131)
HBA1C MFR BLD: 6 % (ref 4–5.6)
HCT VFR BLD AUTO: 51.6 % (ref 40–54)
HGB BLD-MCNC: 15.8 G/DL (ref 14–18)
IMM GRANULOCYTES # BLD AUTO: 0.01 K/UL (ref 0–0.04)
IMM GRANULOCYTES NFR BLD AUTO: 0.1 % (ref 0–0.5)
LYMPHOCYTES # BLD AUTO: 1.8 K/UL (ref 1–4.8)
LYMPHOCYTES NFR BLD: 25.6 % (ref 18–48)
MCH RBC QN AUTO: 28.8 PG (ref 27–31)
MCHC RBC AUTO-ENTMCNC: 30.6 G/DL (ref 32–36)
MCV RBC AUTO: 94 FL (ref 82–98)
MONOCYTES # BLD AUTO: 0.7 K/UL (ref 0.3–1)
MONOCYTES NFR BLD: 9.6 % (ref 4–15)
NEUTROPHILS # BLD AUTO: 4.4 K/UL (ref 1.8–7.7)
NEUTROPHILS NFR BLD: 60.9 % (ref 38–73)
NRBC BLD-RTO: 0 /100 WBC
PLATELET # BLD AUTO: 308 K/UL (ref 150–450)
PMV BLD AUTO: 10.7 FL (ref 9.2–12.9)
RBC # BLD AUTO: 5.49 M/UL (ref 4.6–6.2)
WBC # BLD AUTO: 7.19 K/UL (ref 3.9–12.7)

## 2022-01-06 PROCEDURE — 99999 PR PBB SHADOW E&M-EST. PATIENT-LVL III: CPT | Mod: PBBFAC,,, | Performed by: INTERNAL MEDICINE

## 2022-01-06 PROCEDURE — 3078F DIAST BP <80 MM HG: CPT | Mod: CPTII,S$GLB,, | Performed by: INTERNAL MEDICINE

## 2022-01-06 PROCEDURE — 99999 PR PBB SHADOW E&M-EST. PATIENT-LVL III: ICD-10-PCS | Mod: PBBFAC,,, | Performed by: INTERNAL MEDICINE

## 2022-01-06 PROCEDURE — 99214 PR OFFICE/OUTPT VISIT, EST, LEVL IV, 30-39 MIN: ICD-10-PCS | Mod: S$GLB,,, | Performed by: INTERNAL MEDICINE

## 2022-01-06 PROCEDURE — 3044F PR MOST RECENT HEMOGLOBIN A1C LEVEL <7.0%: ICD-10-PCS | Mod: CPTII,S$GLB,, | Performed by: INTERNAL MEDICINE

## 2022-01-06 PROCEDURE — 3008F PR BODY MASS INDEX (BMI) DOCUMENTED: ICD-10-PCS | Mod: CPTII,S$GLB,, | Performed by: INTERNAL MEDICINE

## 2022-01-06 PROCEDURE — 99214 OFFICE O/P EST MOD 30 MIN: CPT | Mod: S$GLB,,, | Performed by: INTERNAL MEDICINE

## 2022-01-06 PROCEDURE — 3074F PR MOST RECENT SYSTOLIC BLOOD PRESSURE < 130 MM HG: ICD-10-PCS | Mod: CPTII,S$GLB,, | Performed by: INTERNAL MEDICINE

## 2022-01-06 PROCEDURE — 1159F PR MEDICATION LIST DOCUMENTED IN MEDICAL RECORD: ICD-10-PCS | Mod: CPTII,S$GLB,, | Performed by: INTERNAL MEDICINE

## 2022-01-06 PROCEDURE — 1159F MED LIST DOCD IN RCRD: CPT | Mod: CPTII,S$GLB,, | Performed by: INTERNAL MEDICINE

## 2022-01-06 PROCEDURE — 3078F PR MOST RECENT DIASTOLIC BLOOD PRESSURE < 80 MM HG: ICD-10-PCS | Mod: CPTII,S$GLB,, | Performed by: INTERNAL MEDICINE

## 2022-01-06 PROCEDURE — 3074F SYST BP LT 130 MM HG: CPT | Mod: CPTII,S$GLB,, | Performed by: INTERNAL MEDICINE

## 2022-01-06 PROCEDURE — 3044F HG A1C LEVEL LT 7.0%: CPT | Mod: CPTII,S$GLB,, | Performed by: INTERNAL MEDICINE

## 2022-01-06 PROCEDURE — 3008F BODY MASS INDEX DOCD: CPT | Mod: CPTII,S$GLB,, | Performed by: INTERNAL MEDICINE

## 2022-01-06 NOTE — PROGRESS NOTES
ESTABLISHED PATIENT VISIT    Calvin Smith  is a pleasant 56 y.o. male  with PMH significant for anxety, MARY JO dx circa 2014, CPAP intolerance  who presented mid 2020 for management of insomnia.    Here today for persistent insomnia    PLAN last visit:   -delay bedtime  -referral for CBT-I to psychiatry department  -we discussed delaying bedtime to 11P-12A   -we discussed another trial of PAP therapy (nasal mask/ CCL)  to see if this would help him stay asleep , but will hold off for now  -driving precautions were discussed with the patient    Since last visit:   Continues to have trouble sleeping  Feeling tired during the day even when getting 6 hours of sleep or so.     PAP history   Problems Air felt too cold   Mask FFM (did not try nasal)   Pressure    Benefit    DME    Machine age Tried in 2014, no longer has machine   Download      SLEEP SCHEDULE   Bed Time 10-10:30P   Sleep Latency 2-3 hours   Arousals frequent   Nocturia none   Back to sleep    Wake time 7AM   Naps none   Work        Vitals:    01/06/22 1444   BP: 106/73   BP Location: Right arm   Patient Position: Sitting   BP Method: Medium (Automatic)   Pulse: 72   Weight: 83.5 kg (184 lb 1.4 oz)     Physical Exam:    GEN:   Well-appearing  Psych:  Appropriate affect, demonstrates insight  SKIN:  No rash on the face or bridge of the nose      RECORDS REVIEWED TODAY:    Lab Results   Component Value Date    HGB 15.8 01/05/2022    CO2 27 01/05/2022         RECORDS REVIEWED PREVIOUSLY:        ASSESSMENT  PROBLEM DESCRIPTION Interval Hx STATUS   Insomnia       Takes 2-3 hours to fall asleep  Waking up frequently Continues to have trouble falling and staying asleep  Worse the past 2 weeks  Spending 9 hours or so in bed persists   hx MARY JO   Dx circa 2014. Tried CPAP, couldn't tolerate  HEENT:MP4, + tongue scalloping Interested in trying therapy again uncontrolled   CPAP intolerance had ffm, did not like the cold air.       n/a   Fatigue   + sleepiness when  inactive   denies sleepiness when driving   ESS n/a/24 on intake Feeling tired during the day    Nocturia    Maybe once per night    Comorbidities:     PLAN     -will repeat sleep study with HST and consider trial of CPAP if MARY JO is present (nasal mask, heated humidity)  -discussed MAD or inspire if unable to tolerate  -try to push back bedtime  -4 week insomnia workbook    RTC after sleep testing         The patient was given open opportunity to ask questions and/or express concerns about treatment plan.   All questions/concerns were discussed.     Two patient identifiers used prior to evaluation.

## 2022-01-06 NOTE — PROGRESS NOTES
Care Everywhere: updated  Immunization: updated  Health Maintenance: updated  Media Review:   Legacy Review:   DIS:  Order placed:   Upcoming appts:  EFAX:  Task Tickets:  Referrals:

## 2022-01-07 ENCOUNTER — PATIENT MESSAGE (OUTPATIENT)
Dept: INTERNAL MEDICINE | Facility: CLINIC | Age: 57
End: 2022-01-07
Payer: COMMERCIAL

## 2022-01-07 ENCOUNTER — TELEPHONE (OUTPATIENT)
Dept: INTERNAL MEDICINE | Facility: CLINIC | Age: 57
End: 2022-01-07
Payer: COMMERCIAL

## 2022-01-07 DIAGNOSIS — R73.03 PREDIABETES: ICD-10-CM

## 2022-01-07 NOTE — TELEPHONE ENCOUNTER
Scheduled repeat labs for 3 months   informed pt of labs via my chart   Sent pt letter about appointment

## 2022-01-10 ENCOUNTER — TELEPHONE (OUTPATIENT)
Dept: SLEEP MEDICINE | Facility: OTHER | Age: 57
End: 2022-01-10
Payer: COMMERCIAL

## 2022-01-11 ENCOUNTER — HOSPITAL ENCOUNTER (OUTPATIENT)
Dept: SLEEP MEDICINE | Facility: OTHER | Age: 57
Discharge: HOME | End: 2022-01-11
Attending: INTERNAL MEDICINE
Payer: COMMERCIAL

## 2022-01-11 ENCOUNTER — TELEPHONE (OUTPATIENT)
Dept: SLEEP MEDICINE | Facility: OTHER | Age: 57
End: 2022-01-11
Payer: COMMERCIAL

## 2022-01-11 DIAGNOSIS — G47.33 OSA (OBSTRUCTIVE SLEEP APNEA): ICD-10-CM

## 2022-01-11 DIAGNOSIS — R06.83 SNORING: ICD-10-CM

## 2022-01-11 DIAGNOSIS — F51.09 OTHER INSOMNIA NOT DUE TO A SUBSTANCE OR KNOWN PHYSIOLOGICAL CONDITION: ICD-10-CM

## 2022-01-11 PROCEDURE — 95806 SLEEP STUDY UNATT&RESP EFFT: CPT | Mod: 26,,, | Performed by: INTERNAL MEDICINE

## 2022-01-11 PROCEDURE — 95800 SLP STDY UNATTENDED: CPT

## 2022-01-11 PROCEDURE — 95806 PR SLEEP STUDY, UNATTENDED, SIMUL RECORD HR/O2 SAT/RESP FLOW/RESP EFFT: ICD-10-PCS | Mod: 26,,, | Performed by: INTERNAL MEDICINE

## 2022-01-13 ENCOUNTER — PATIENT MESSAGE (OUTPATIENT)
Dept: SLEEP MEDICINE | Facility: CLINIC | Age: 57
End: 2022-01-13
Payer: COMMERCIAL

## 2022-01-13 DIAGNOSIS — G47.33 OSA (OBSTRUCTIVE SLEEP APNEA): Primary | ICD-10-CM

## 2022-01-13 PROBLEM — F51.09 OTHER INSOMNIA NOT DUE TO A SUBSTANCE OR KNOWN PHYSIOLOGICAL CONDITION: Status: ACTIVE | Noted: 2019-12-31

## 2022-03-07 ENCOUNTER — PATIENT MESSAGE (OUTPATIENT)
Dept: SLEEP MEDICINE | Facility: CLINIC | Age: 57
End: 2022-03-07
Payer: COMMERCIAL

## 2022-03-14 ENCOUNTER — PATIENT MESSAGE (OUTPATIENT)
Dept: SLEEP MEDICINE | Facility: CLINIC | Age: 57
End: 2022-03-14
Payer: COMMERCIAL

## 2022-04-07 ENCOUNTER — LAB VISIT (OUTPATIENT)
Dept: LAB | Facility: HOSPITAL | Age: 57
End: 2022-04-07
Attending: FAMILY MEDICINE
Payer: COMMERCIAL

## 2022-04-07 DIAGNOSIS — R73.03 PREDIABETES: ICD-10-CM

## 2022-04-07 LAB
ESTIMATED AVG GLUCOSE: 128 MG/DL (ref 68–131)
HBA1C MFR BLD: 6.1 % (ref 4–5.6)

## 2022-04-07 PROCEDURE — 83036 HEMOGLOBIN GLYCOSYLATED A1C: CPT | Performed by: FAMILY MEDICINE

## 2022-04-07 PROCEDURE — 36415 COLL VENOUS BLD VENIPUNCTURE: CPT | Mod: PO | Performed by: FAMILY MEDICINE

## 2022-04-08 ENCOUNTER — PATIENT MESSAGE (OUTPATIENT)
Dept: INTERNAL MEDICINE | Facility: CLINIC | Age: 57
End: 2022-04-08
Payer: COMMERCIAL

## 2022-04-08 DIAGNOSIS — R73.03 PREDIABETES: Primary | ICD-10-CM

## 2022-04-08 RX ORDER — METFORMIN HYDROCHLORIDE 500 MG/1
500 TABLET, EXTENDED RELEASE ORAL
Qty: 90 TABLET | Refills: 3 | Status: SHIPPED | OUTPATIENT
Start: 2022-04-08 | End: 2023-01-24 | Stop reason: SDUPTHER

## 2022-07-27 ENCOUNTER — PATIENT MESSAGE (OUTPATIENT)
Dept: INTERNAL MEDICINE | Facility: CLINIC | Age: 57
End: 2022-07-27
Payer: COMMERCIAL

## 2022-10-31 ENCOUNTER — PATIENT MESSAGE (OUTPATIENT)
Dept: INTERNAL MEDICINE | Facility: CLINIC | Age: 57
End: 2022-10-31
Payer: COMMERCIAL

## 2022-12-04 ENCOUNTER — PATIENT MESSAGE (OUTPATIENT)
Dept: INTERNAL MEDICINE | Facility: CLINIC | Age: 57
End: 2022-12-04
Payer: COMMERCIAL

## 2022-12-05 ENCOUNTER — TELEPHONE (OUTPATIENT)
Dept: INTERNAL MEDICINE | Facility: CLINIC | Age: 57
End: 2022-12-05
Payer: COMMERCIAL

## 2022-12-05 DIAGNOSIS — E78.5 DYSLIPIDEMIA: ICD-10-CM

## 2022-12-05 DIAGNOSIS — R73.03 PREDIABETES: ICD-10-CM

## 2022-12-05 DIAGNOSIS — Z12.5 PROSTATE CANCER SCREENING: ICD-10-CM

## 2022-12-05 DIAGNOSIS — Z00.00 WELL ADULT EXAM: Primary | ICD-10-CM

## 2022-12-18 NOTE — TELEPHONE ENCOUNTER
Care Due:                  Date            Visit Type   Department     Provider  --------------------------------------------------------------------------------                                EP -                              PRIMARY      [facility] INTERNAL  Last Visit: 01-      CARE ([facility])   MEDICINE       Dc Fitzgerald                              MYCHART                              ANNUAL                              CHECKUP/PHY  [facility] INTERNAL  Next Visit: 01-      [facility]       Dc Fitzgerald                                                            Last  Test          Frequency    Reason                     Performed    Due Date  --------------------------------------------------------------------------------    CMP.........  12 months..  atorvastatin, metFORMIN..  01- 01-    HBA1C.......  6 months...  metFORMIN................  04-   10-    Lipid Panel.  12 months..  atorvastatin.............  01- 01-    [facility] Embedded Care Gaps. Reference number: [id number]. 12/17/2022   7:17:05 PM CST

## 2022-12-19 RX ORDER — ATORVASTATIN CALCIUM 10 MG/1
TABLET, FILM COATED ORAL
Qty: 90 TABLET | Refills: 0 | Status: SHIPPED | OUTPATIENT
Start: 2022-12-19 | End: 2023-01-24 | Stop reason: SDUPTHER

## 2022-12-19 NOTE — TELEPHONE ENCOUNTER
Refill Decision Note   Calvin Smith  is requesting a refill authorization.  Brief Assessment and Rationale for Refill:  Approve     Medication Therapy Plan:       Medication Reconciliation Completed: No   Comments:     No Care Gaps recommended.     Note composed:1:10 PM 12/19/2022

## 2023-01-17 RX ORDER — FLUTICASONE PROPIONATE 50 MCG
SPRAY, SUSPENSION (ML) NASAL
Qty: 48 G | Refills: 0 | Status: SHIPPED | OUTPATIENT
Start: 2023-01-17 | End: 2023-01-24 | Stop reason: SDUPTHER

## 2023-01-17 NOTE — TELEPHONE ENCOUNTER
No new care gaps identified.  [facility] Embedded Care Gaps. Reference number: [id number]. 1/17/2023   5:28:55 AM CST

## 2023-01-18 ENCOUNTER — LAB VISIT (OUTPATIENT)
Dept: LAB | Facility: HOSPITAL | Age: 58
End: 2023-01-18
Payer: COMMERCIAL

## 2023-01-18 DIAGNOSIS — R73.03 PREDIABETES: ICD-10-CM

## 2023-01-18 DIAGNOSIS — Z12.5 PROSTATE CANCER SCREENING: ICD-10-CM

## 2023-01-18 DIAGNOSIS — E78.5 DYSLIPIDEMIA: ICD-10-CM

## 2023-01-18 DIAGNOSIS — Z00.00 WELL ADULT EXAM: ICD-10-CM

## 2023-01-18 LAB
ALBUMIN SERPL BCP-MCNC: 4.2 G/DL (ref 3.5–5.2)
ALP SERPL-CCNC: 73 U/L (ref 55–135)
ALT SERPL W/O P-5'-P-CCNC: 53 U/L (ref 10–44)
ANION GAP SERPL CALC-SCNC: 8 MMOL/L (ref 8–16)
AST SERPL-CCNC: 34 U/L (ref 10–40)
BASOPHILS # BLD AUTO: 0.04 K/UL (ref 0–0.2)
BASOPHILS NFR BLD: 0.7 % (ref 0–1.9)
BILIRUB SERPL-MCNC: 0.6 MG/DL (ref 0.1–1)
BUN SERPL-MCNC: 20 MG/DL (ref 6–20)
CALCIUM SERPL-MCNC: 9.6 MG/DL (ref 8.7–10.5)
CHLORIDE SERPL-SCNC: 108 MMOL/L (ref 95–110)
CHOLEST SERPL-MCNC: 138 MG/DL (ref 120–199)
CHOLEST/HDLC SERPL: 4.5 {RATIO} (ref 2–5)
CO2 SERPL-SCNC: 25 MMOL/L (ref 23–29)
COMPLEXED PSA SERPL-MCNC: 1.4 NG/ML (ref 0–4)
CREAT SERPL-MCNC: 1 MG/DL (ref 0.5–1.4)
DIFFERENTIAL METHOD: ABNORMAL
EOSINOPHIL # BLD AUTO: 0.2 K/UL (ref 0–0.5)
EOSINOPHIL NFR BLD: 3.1 % (ref 0–8)
ERYTHROCYTE [DISTWIDTH] IN BLOOD BY AUTOMATED COUNT: 12.6 % (ref 11.5–14.5)
EST. GFR  (NO RACE VARIABLE): >60 ML/MIN/1.73 M^2
ESTIMATED AVG GLUCOSE: 120 MG/DL (ref 68–131)
GLUCOSE SERPL-MCNC: 107 MG/DL (ref 70–110)
HBA1C MFR BLD: 5.8 % (ref 4–5.6)
HCT VFR BLD AUTO: 47.6 % (ref 40–54)
HDLC SERPL-MCNC: 31 MG/DL (ref 40–75)
HDLC SERPL: 22.5 % (ref 20–50)
HGB BLD-MCNC: 15.2 G/DL (ref 14–18)
IMM GRANULOCYTES # BLD AUTO: 0.02 K/UL (ref 0–0.04)
IMM GRANULOCYTES NFR BLD AUTO: 0.3 % (ref 0–0.5)
LDLC SERPL CALC-MCNC: 85 MG/DL (ref 63–159)
LYMPHOCYTES # BLD AUTO: 1.8 K/UL (ref 1–4.8)
LYMPHOCYTES NFR BLD: 29.6 % (ref 18–48)
MCH RBC QN AUTO: 28.4 PG (ref 27–31)
MCHC RBC AUTO-ENTMCNC: 31.9 G/DL (ref 32–36)
MCV RBC AUTO: 89 FL (ref 82–98)
MONOCYTES # BLD AUTO: 0.5 K/UL (ref 0.3–1)
MONOCYTES NFR BLD: 8.3 % (ref 4–15)
NEUTROPHILS # BLD AUTO: 3.6 K/UL (ref 1.8–7.7)
NEUTROPHILS NFR BLD: 58 % (ref 38–73)
NONHDLC SERPL-MCNC: 107 MG/DL
NRBC BLD-RTO: 0 /100 WBC
PLATELET # BLD AUTO: 350 K/UL (ref 150–450)
PMV BLD AUTO: 10.7 FL (ref 9.2–12.9)
POTASSIUM SERPL-SCNC: 4.5 MMOL/L (ref 3.5–5.1)
PROT SERPL-MCNC: 7.5 G/DL (ref 6–8.4)
RBC # BLD AUTO: 5.35 M/UL (ref 4.6–6.2)
SODIUM SERPL-SCNC: 141 MMOL/L (ref 136–145)
T4 FREE SERPL-MCNC: 0.94 NG/DL (ref 0.71–1.51)
TRIGL SERPL-MCNC: 110 MG/DL (ref 30–150)
TSH SERPL DL<=0.005 MIU/L-ACNC: 2.13 UIU/ML (ref 0.4–4)
WBC # BLD AUTO: 6.15 K/UL (ref 3.9–12.7)

## 2023-01-18 PROCEDURE — 80061 LIPID PANEL: CPT | Performed by: FAMILY MEDICINE

## 2023-01-18 PROCEDURE — 84443 ASSAY THYROID STIM HORMONE: CPT | Performed by: FAMILY MEDICINE

## 2023-01-18 PROCEDURE — 84153 ASSAY OF PSA TOTAL: CPT | Performed by: FAMILY MEDICINE

## 2023-01-18 PROCEDURE — 36415 COLL VENOUS BLD VENIPUNCTURE: CPT | Mod: PO | Performed by: FAMILY MEDICINE

## 2023-01-18 PROCEDURE — 80053 COMPREHEN METABOLIC PANEL: CPT | Performed by: FAMILY MEDICINE

## 2023-01-18 PROCEDURE — 83036 HEMOGLOBIN GLYCOSYLATED A1C: CPT | Performed by: FAMILY MEDICINE

## 2023-01-18 PROCEDURE — 84439 ASSAY OF FREE THYROXINE: CPT | Performed by: FAMILY MEDICINE

## 2023-01-18 PROCEDURE — 85025 COMPLETE CBC W/AUTO DIFF WBC: CPT | Performed by: FAMILY MEDICINE

## 2023-01-18 NOTE — TELEPHONE ENCOUNTER
Refill Decision Note   Calvin Smith  is requesting a refill authorization.  Brief Assessment and Rationale for Refill:  Approve     Medication Therapy Plan:       Medication Reconciliation Completed: No   Comments:     No Care Gaps recommended.     Note composed:6:36 PM 01/17/2023

## 2023-01-24 ENCOUNTER — OFFICE VISIT (OUTPATIENT)
Dept: INTERNAL MEDICINE | Facility: CLINIC | Age: 58
End: 2023-01-24
Payer: COMMERCIAL

## 2023-01-24 VITALS
WEIGHT: 178 LBS | BODY MASS INDEX: 25.48 KG/M2 | TEMPERATURE: 97 F | RESPIRATION RATE: 16 BRPM | DIASTOLIC BLOOD PRESSURE: 78 MMHG | OXYGEN SATURATION: 98 % | SYSTOLIC BLOOD PRESSURE: 108 MMHG | HEIGHT: 70 IN | HEART RATE: 78 BPM

## 2023-01-24 DIAGNOSIS — Z86.73 HISTORY OF TRANSIENT ISCHEMIC ATTACK (TIA): ICD-10-CM

## 2023-01-24 DIAGNOSIS — K76.0 STEATOSIS OF LIVER: ICD-10-CM

## 2023-01-24 DIAGNOSIS — F41.9 ANXIETY: ICD-10-CM

## 2023-01-24 DIAGNOSIS — Z00.00 WELL ADULT EXAM: Primary | ICD-10-CM

## 2023-01-24 DIAGNOSIS — R73.03 PREDIABETES: ICD-10-CM

## 2023-01-24 DIAGNOSIS — Z23 NEED FOR PROPHYLACTIC VACCINATION AND INOCULATION AGAINST INFLUENZA: ICD-10-CM

## 2023-01-24 DIAGNOSIS — E78.5 DYSLIPIDEMIA: ICD-10-CM

## 2023-01-24 PROCEDURE — 1160F RVW MEDS BY RX/DR IN RCRD: CPT | Mod: CPTII,S$GLB,, | Performed by: FAMILY MEDICINE

## 2023-01-24 PROCEDURE — 3008F PR BODY MASS INDEX (BMI) DOCUMENTED: ICD-10-PCS | Mod: CPTII,S$GLB,, | Performed by: FAMILY MEDICINE

## 2023-01-24 PROCEDURE — 90471 FLU VACCINE (QUAD) GREATER THAN OR EQUAL TO 3YO PRESERVATIVE FREE IM: ICD-10-PCS | Mod: S$GLB,,, | Performed by: FAMILY MEDICINE

## 2023-01-24 PROCEDURE — 1159F MED LIST DOCD IN RCRD: CPT | Mod: CPTII,S$GLB,, | Performed by: FAMILY MEDICINE

## 2023-01-24 PROCEDURE — 3078F DIAST BP <80 MM HG: CPT | Mod: CPTII,S$GLB,, | Performed by: FAMILY MEDICINE

## 2023-01-24 PROCEDURE — 3044F HG A1C LEVEL LT 7.0%: CPT | Mod: CPTII,S$GLB,, | Performed by: FAMILY MEDICINE

## 2023-01-24 PROCEDURE — 99396 PREV VISIT EST AGE 40-64: CPT | Mod: 25,S$GLB,, | Performed by: FAMILY MEDICINE

## 2023-01-24 PROCEDURE — 99396 PR PREVENTIVE VISIT,EST,40-64: ICD-10-PCS | Mod: 25,S$GLB,, | Performed by: FAMILY MEDICINE

## 2023-01-24 PROCEDURE — 3074F SYST BP LT 130 MM HG: CPT | Mod: CPTII,S$GLB,, | Performed by: FAMILY MEDICINE

## 2023-01-24 PROCEDURE — 99999 PR PBB SHADOW E&M-EST. PATIENT-LVL IV: ICD-10-PCS | Mod: PBBFAC,,, | Performed by: FAMILY MEDICINE

## 2023-01-24 PROCEDURE — 3044F PR MOST RECENT HEMOGLOBIN A1C LEVEL <7.0%: ICD-10-PCS | Mod: CPTII,S$GLB,, | Performed by: FAMILY MEDICINE

## 2023-01-24 PROCEDURE — 99999 PR PBB SHADOW E&M-EST. PATIENT-LVL IV: CPT | Mod: PBBFAC,,, | Performed by: FAMILY MEDICINE

## 2023-01-24 PROCEDURE — 3008F BODY MASS INDEX DOCD: CPT | Mod: CPTII,S$GLB,, | Performed by: FAMILY MEDICINE

## 2023-01-24 PROCEDURE — 90471 IMMUNIZATION ADMIN: CPT | Mod: S$GLB,,, | Performed by: FAMILY MEDICINE

## 2023-01-24 PROCEDURE — 1160F PR REVIEW ALL MEDS BY PRESCRIBER/CLIN PHARMACIST DOCUMENTED: ICD-10-PCS | Mod: CPTII,S$GLB,, | Performed by: FAMILY MEDICINE

## 2023-01-24 PROCEDURE — 3078F PR MOST RECENT DIASTOLIC BLOOD PRESSURE < 80 MM HG: ICD-10-PCS | Mod: CPTII,S$GLB,, | Performed by: FAMILY MEDICINE

## 2023-01-24 PROCEDURE — 90686 FLU VACCINE (QUAD) GREATER THAN OR EQUAL TO 3YO PRESERVATIVE FREE IM: ICD-10-PCS | Mod: S$GLB,,, | Performed by: FAMILY MEDICINE

## 2023-01-24 PROCEDURE — 1159F PR MEDICATION LIST DOCUMENTED IN MEDICAL RECORD: ICD-10-PCS | Mod: CPTII,S$GLB,, | Performed by: FAMILY MEDICINE

## 2023-01-24 PROCEDURE — 90686 IIV4 VACC NO PRSV 0.5 ML IM: CPT | Mod: S$GLB,,, | Performed by: FAMILY MEDICINE

## 2023-01-24 PROCEDURE — 3074F PR MOST RECENT SYSTOLIC BLOOD PRESSURE < 130 MM HG: ICD-10-PCS | Mod: CPTII,S$GLB,, | Performed by: FAMILY MEDICINE

## 2023-01-24 RX ORDER — SERTRALINE HYDROCHLORIDE 25 MG/1
25 TABLET, FILM COATED ORAL DAILY
Qty: 90 TABLET | Refills: 3 | Status: SHIPPED | OUTPATIENT
Start: 2023-01-24 | End: 2024-01-11

## 2023-01-24 RX ORDER — METFORMIN HYDROCHLORIDE 500 MG/1
500 TABLET, EXTENDED RELEASE ORAL
Qty: 90 TABLET | Refills: 3 | Status: SHIPPED | OUTPATIENT
Start: 2023-01-24 | End: 2024-02-16 | Stop reason: SDUPTHER

## 2023-01-24 RX ORDER — ATORVASTATIN CALCIUM 10 MG/1
10 TABLET, FILM COATED ORAL DAILY
Qty: 90 TABLET | Refills: 3 | Status: SHIPPED | OUTPATIENT
Start: 2023-01-24 | End: 2024-02-16 | Stop reason: SDUPTHER

## 2023-01-24 RX ORDER — FLUTICASONE PROPIONATE 50 MCG
SPRAY, SUSPENSION (ML) NASAL
Qty: 48 G | Refills: 3 | Status: SHIPPED | OUTPATIENT
Start: 2023-01-24 | End: 2023-10-04 | Stop reason: SDUPTHER

## 2023-01-24 NOTE — PROGRESS NOTES
Subjective:       Patient ID: Calvin Smith is a 57 y.o. male.    Chief Complaint: Annual Exam    HPI 57-year-old white male presents to clinic today for annual physical exam.  He reports a previous TIA many years ago but at this time remains stable on aspirin 81 mg daily.  Hyperlipidemia remains well controlled on Lipitor 10 mg daily.  Prediabetes has improved on metformin 500 mg daily with current hemoglobin A1c of 5.8.  Anxiety remains stable on Zoloft 25 mg daily.  He has been evaluated by Sleep Medicine and is in the process of waiting for his new CPAP machine.  He reports a past surgical history of appendectomy, epidural spinal injection, and previous colonoscopy.  He has a family history of his father having leukemia and mother hypothyroidism.  He is up-to-date with all vaccinations and flu vaccine will be given today.  Review of Systems   Constitutional:  Negative for appetite change, chills, fatigue and fever.   HENT:  Negative for nasal congestion, ear pain, hearing loss, postnasal drip, rhinorrhea, sinus pressure/congestion, sore throat and tinnitus.    Eyes:  Negative for redness, itching and visual disturbance.   Respiratory:  Negative for cough, chest tightness and shortness of breath.    Cardiovascular:  Negative for chest pain and palpitations.   Gastrointestinal:  Negative for abdominal pain, constipation, diarrhea, nausea and vomiting.   Genitourinary:  Negative for decreased urine volume, difficulty urinating, dysuria, frequency, hematuria and urgency.   Musculoskeletal:  Negative for back pain, myalgias, neck pain and neck stiffness.   Integumentary:  Negative for rash.   Neurological:  Negative for dizziness, light-headedness and headaches.   Psychiatric/Behavioral: Negative.         Objective:      Physical Exam  Vitals and nursing note reviewed.   Constitutional:       General: He is not in acute distress.     Appearance: Normal appearance. He is well-developed. He is not diaphoretic.   HENT:       Head: Normocephalic and atraumatic.      Right Ear: External ear normal.      Left Ear: External ear normal.      Nose: Nose normal.      Mouth/Throat:      Pharynx: No oropharyngeal exudate.   Eyes:      General: No scleral icterus.        Right eye: No discharge.         Left eye: No discharge.      Conjunctiva/sclera: Conjunctivae normal.      Pupils: Pupils are equal, round, and reactive to light.   Neck:      Thyroid: No thyromegaly.      Vascular: No JVD.      Trachea: No tracheal deviation.   Cardiovascular:      Rate and Rhythm: Normal rate and regular rhythm.      Heart sounds: Normal heart sounds. No murmur heard.    No friction rub. No gallop.   Pulmonary:      Effort: Pulmonary effort is normal. No respiratory distress.      Breath sounds: Normal breath sounds. No stridor. No wheezing, rhonchi or rales.   Chest:      Chest wall: No tenderness.   Abdominal:      General: Bowel sounds are normal. There is no distension.      Palpations: Abdomen is soft. There is no mass.      Tenderness: There is no abdominal tenderness. There is no guarding or rebound.   Musculoskeletal:         General: No tenderness. Normal range of motion.      Cervical back: Normal range of motion and neck supple.   Lymphadenopathy:      Cervical: No cervical adenopathy.   Skin:     General: Skin is warm and dry.      Coloration: Skin is not pale.      Findings: No erythema or rash.   Neurological:      Mental Status: He is alert and oriented to person, place, and time.   Psychiatric:         Mood and Affect: Mood normal.         Behavior: Behavior normal.         Thought Content: Thought content normal.         Judgment: Judgment normal.       Assessment:       Problem List Items Addressed This Visit       Anxiety    Dyslipidemia    Prediabetes    Relevant Medications    metFORMIN (GLUCOPHAGE-XR) 500 MG ER 24hr tablet    Steatosis of liver     Other Visit Diagnoses       Well adult exam    -  Primary    Need for prophylactic  vaccination and inoculation against influenza                  Plan:         1. Labs have been reviewed and are overall within normal limits.    2. Patient had a previous history of TIA and is currently stable.  Continue aspirin 81 mg daily.  3. Continue Lipitor 10 mg daily.  Dyslipidemia is well controlled.  4. Continue metformin 500 mg daily.  Prediabetes is well controlled.  Current A1c has improved to 5.8.    5. Continue diet and exercise.  Steatosis of the liver is stable.  6. Continue Zoloft 25 mg daily.  Anxiety is stable.  7. Flu vaccine given.  8. Return to clinic as needed or in 1 year for annual physical exam.

## 2023-08-20 ENCOUNTER — PATIENT MESSAGE (OUTPATIENT)
Dept: INTERNAL MEDICINE | Facility: CLINIC | Age: 58
End: 2023-08-20
Payer: COMMERCIAL

## 2023-08-21 RX ORDER — SCOLOPAMINE TRANSDERMAL SYSTEM 1 MG/1
1 PATCH, EXTENDED RELEASE TRANSDERMAL
Qty: 10 PATCH | Refills: 0 | Status: SHIPPED | OUTPATIENT
Start: 2023-08-21

## 2023-08-21 NOTE — TELEPHONE ENCOUNTER
Pt is going deep sea fishing and is requesting a prescription for Transderm-Scop 1.5 MG / 3 Day , says it has worked in the past .

## 2023-09-06 ENCOUNTER — HOSPITAL ENCOUNTER (OUTPATIENT)
Facility: OTHER | Age: 58
Discharge: HOME | End: 2023-09-07
Attending: EMERGENCY MEDICINE | Admitting: HOSPITALIST
Payer: COMMERCIAL

## 2023-09-06 DIAGNOSIS — M47.9 SPONDYLOSIS: ICD-10-CM

## 2023-09-06 DIAGNOSIS — M48.061 NEUROFORAMINAL STENOSIS OF LUMBAR SPINE: Primary | ICD-10-CM

## 2023-09-06 DIAGNOSIS — M54.50 ACUTE LOW BACK PAIN: ICD-10-CM

## 2023-09-06 DIAGNOSIS — R07.9 CHEST PAIN: ICD-10-CM

## 2023-09-06 LAB
ALBUMIN SERPL BCP-MCNC: 4.5 G/DL (ref 3.5–5.2)
ALP SERPL-CCNC: 72 U/L (ref 55–135)
ALT SERPL W/O P-5'-P-CCNC: 73 U/L (ref 10–44)
ANION GAP SERPL CALC-SCNC: 9 MMOL/L (ref 8–16)
AST SERPL-CCNC: 48 U/L (ref 10–40)
BASOPHILS # BLD AUTO: 0.04 K/UL (ref 0–0.2)
BASOPHILS NFR BLD: 0.5 % (ref 0–1.9)
BILIRUB SERPL-MCNC: 1 MG/DL (ref 0.1–1)
BILIRUB UR QL STRIP: NEGATIVE
BUN SERPL-MCNC: 17 MG/DL (ref 6–20)
CALCIUM SERPL-MCNC: 10.3 MG/DL (ref 8.7–10.5)
CHLORIDE SERPL-SCNC: 109 MMOL/L (ref 95–110)
CLARITY UR: CLEAR
CO2 SERPL-SCNC: 25 MMOL/L (ref 23–29)
COLOR UR: YELLOW
CREAT SERPL-MCNC: 1.1 MG/DL (ref 0.5–1.4)
DIFFERENTIAL METHOD: NORMAL
EOSINOPHIL # BLD AUTO: 0 K/UL (ref 0–0.5)
EOSINOPHIL NFR BLD: 0.5 % (ref 0–8)
ERYTHROCYTE [DISTWIDTH] IN BLOOD BY AUTOMATED COUNT: 12.5 % (ref 11.5–14.5)
EST. GFR  (NO RACE VARIABLE): >60 ML/MIN/1.73 M^2
GLUCOSE SERPL-MCNC: 101 MG/DL (ref 70–110)
GLUCOSE UR QL STRIP: NEGATIVE
HCT VFR BLD AUTO: 47.2 % (ref 40–54)
HGB BLD-MCNC: 15.6 G/DL (ref 14–18)
HGB UR QL STRIP: NEGATIVE
IMM GRANULOCYTES # BLD AUTO: 0.01 K/UL (ref 0–0.04)
IMM GRANULOCYTES NFR BLD AUTO: 0.1 % (ref 0–0.5)
KETONES UR QL STRIP: NEGATIVE
LEUKOCYTE ESTERASE UR QL STRIP: NEGATIVE
LYMPHOCYTES # BLD AUTO: 1.6 K/UL (ref 1–4.8)
LYMPHOCYTES NFR BLD: 20.6 % (ref 18–48)
MCH RBC QN AUTO: 28.3 PG (ref 27–31)
MCHC RBC AUTO-ENTMCNC: 33.1 G/DL (ref 32–36)
MCV RBC AUTO: 86 FL (ref 82–98)
MONOCYTES # BLD AUTO: 0.6 K/UL (ref 0.3–1)
MONOCYTES NFR BLD: 7.7 % (ref 4–15)
NEUTROPHILS # BLD AUTO: 5.6 K/UL (ref 1.8–7.7)
NEUTROPHILS NFR BLD: 70.6 % (ref 38–73)
NITRITE UR QL STRIP: NEGATIVE
NRBC BLD-RTO: 0 /100 WBC
PH UR STRIP: 6 [PH] (ref 5–8)
PLATELET # BLD AUTO: 316 K/UL (ref 150–450)
PMV BLD AUTO: 9.7 FL (ref 9.2–12.9)
POTASSIUM SERPL-SCNC: 4.2 MMOL/L (ref 3.5–5.1)
PROT SERPL-MCNC: 7.8 G/DL (ref 6–8.4)
PROT UR QL STRIP: NEGATIVE
RBC # BLD AUTO: 5.51 M/UL (ref 4.6–6.2)
SODIUM SERPL-SCNC: 143 MMOL/L (ref 136–145)
SP GR UR STRIP: 1.01 (ref 1–1.03)
URN SPEC COLLECT METH UR: NORMAL
UROBILINOGEN UR STRIP-ACNC: NEGATIVE EU/DL
WBC # BLD AUTO: 7.95 K/UL (ref 3.9–12.7)

## 2023-09-06 PROCEDURE — G0378 HOSPITAL OBSERVATION PER HR: HCPCS

## 2023-09-06 PROCEDURE — 63600175 PHARM REV CODE 636 W HCPCS

## 2023-09-06 PROCEDURE — 99223 1ST HOSP IP/OBS HIGH 75: CPT | Mod: ,,, | Performed by: PHYSICIAN ASSISTANT

## 2023-09-06 PROCEDURE — 63600175 PHARM REV CODE 636 W HCPCS: Performed by: INTERNAL MEDICINE

## 2023-09-06 PROCEDURE — 99223 PR INITIAL HOSPITAL CARE,LEVL III: ICD-10-PCS | Mod: ,,, | Performed by: PHYSICIAN ASSISTANT

## 2023-09-06 PROCEDURE — 25000003 PHARM REV CODE 250: Performed by: PHYSICIAN ASSISTANT

## 2023-09-06 PROCEDURE — 81003 URINALYSIS AUTO W/O SCOPE: CPT | Performed by: INTERNAL MEDICINE

## 2023-09-06 PROCEDURE — 80053 COMPREHEN METABOLIC PANEL: CPT | Performed by: INTERNAL MEDICINE

## 2023-09-06 PROCEDURE — 99285 EMERGENCY DEPT VISIT HI MDM: CPT | Mod: 25

## 2023-09-06 PROCEDURE — 96376 TX/PRO/DX INJ SAME DRUG ADON: CPT

## 2023-09-06 PROCEDURE — 25000003 PHARM REV CODE 250: Performed by: INTERNAL MEDICINE

## 2023-09-06 PROCEDURE — 96372 THER/PROPH/DIAG INJ SC/IM: CPT

## 2023-09-06 PROCEDURE — 85025 COMPLETE CBC W/AUTO DIFF WBC: CPT | Performed by: INTERNAL MEDICINE

## 2023-09-06 PROCEDURE — 96374 THER/PROPH/DIAG INJ IV PUSH: CPT

## 2023-09-06 PROCEDURE — 94761 N-INVAS EAR/PLS OXIMETRY MLT: CPT

## 2023-09-06 PROCEDURE — 25000003 PHARM REV CODE 250

## 2023-09-06 RX ORDER — METHOCARBAMOL 500 MG/1
500 TABLET, FILM COATED ORAL 4 TIMES DAILY
Status: DISCONTINUED | OUTPATIENT
Start: 2023-09-06 | End: 2023-09-07 | Stop reason: HOSPADM

## 2023-09-06 RX ORDER — BISACODYL 10 MG
10 SUPPOSITORY, RECTAL RECTAL DAILY PRN
Status: DISCONTINUED | OUTPATIENT
Start: 2023-09-06 | End: 2023-09-07 | Stop reason: HOSPADM

## 2023-09-06 RX ORDER — IBUPROFEN 200 MG
16 TABLET ORAL
Status: DISCONTINUED | OUTPATIENT
Start: 2023-09-06 | End: 2023-09-07 | Stop reason: HOSPADM

## 2023-09-06 RX ORDER — ATORVASTATIN CALCIUM 10 MG/1
10 TABLET, FILM COATED ORAL DAILY
Status: DISCONTINUED | OUTPATIENT
Start: 2023-09-07 | End: 2023-09-07 | Stop reason: HOSPADM

## 2023-09-06 RX ORDER — DEXAMETHASONE SODIUM PHOSPHATE 4 MG/ML
8 INJECTION, SOLUTION INTRA-ARTICULAR; INTRALESIONAL; INTRAMUSCULAR; INTRAVENOUS; SOFT TISSUE
Status: COMPLETED | OUTPATIENT
Start: 2023-09-06 | End: 2023-09-06

## 2023-09-06 RX ORDER — ONDANSETRON 8 MG/1
8 TABLET, ORALLY DISINTEGRATING ORAL EVERY 8 HOURS PRN
Status: DISCONTINUED | OUTPATIENT
Start: 2023-09-06 | End: 2023-09-07 | Stop reason: HOSPADM

## 2023-09-06 RX ORDER — ACETAMINOPHEN 500 MG
1000 TABLET ORAL 2 TIMES DAILY
Status: DISCONTINUED | OUTPATIENT
Start: 2023-09-06 | End: 2023-09-07

## 2023-09-06 RX ORDER — IBUPROFEN 200 MG
24 TABLET ORAL
Status: DISCONTINUED | OUTPATIENT
Start: 2023-09-06 | End: 2023-09-07 | Stop reason: HOSPADM

## 2023-09-06 RX ORDER — POLYETHYLENE GLYCOL 3350 17 G/17G
17 POWDER, FOR SOLUTION ORAL DAILY
Status: DISCONTINUED | OUTPATIENT
Start: 2023-09-07 | End: 2023-09-07 | Stop reason: HOSPADM

## 2023-09-06 RX ORDER — CYCLOBENZAPRINE HCL 10 MG
10 TABLET ORAL
Status: COMPLETED | OUTPATIENT
Start: 2023-09-06 | End: 2023-09-06

## 2023-09-06 RX ORDER — HYDROCODONE BITARTRATE AND ACETAMINOPHEN 5; 325 MG/1; MG/1
1 TABLET ORAL EVERY 6 HOURS PRN
Status: DISCONTINUED | OUTPATIENT
Start: 2023-09-06 | End: 2023-09-07

## 2023-09-06 RX ORDER — NALOXONE HCL 0.4 MG/ML
0.02 VIAL (ML) INJECTION
Status: DISCONTINUED | OUTPATIENT
Start: 2023-09-06 | End: 2023-09-07 | Stop reason: HOSPADM

## 2023-09-06 RX ORDER — HYDROCODONE BITARTRATE AND ACETAMINOPHEN 5; 325 MG/1; MG/1
2 TABLET ORAL
Status: COMPLETED | OUTPATIENT
Start: 2023-09-06 | End: 2023-09-06

## 2023-09-06 RX ORDER — FLUTICASONE PROPIONATE 50 MCG
1 SPRAY, SUSPENSION (ML) NASAL DAILY
Status: DISCONTINUED | OUTPATIENT
Start: 2023-09-07 | End: 2023-09-07 | Stop reason: HOSPADM

## 2023-09-06 RX ORDER — GLUCAGON 1 MG
1 KIT INJECTION
Status: DISCONTINUED | OUTPATIENT
Start: 2023-09-06 | End: 2023-09-07 | Stop reason: HOSPADM

## 2023-09-06 RX ORDER — ASPIRIN 81 MG/1
81 TABLET ORAL DAILY
Status: DISCONTINUED | OUTPATIENT
Start: 2023-09-07 | End: 2023-09-07 | Stop reason: HOSPADM

## 2023-09-06 RX ORDER — TALC
6 POWDER (GRAM) TOPICAL NIGHTLY PRN
Status: DISCONTINUED | OUTPATIENT
Start: 2023-09-06 | End: 2023-09-07 | Stop reason: HOSPADM

## 2023-09-06 RX ORDER — GABAPENTIN 100 MG/1
100 CAPSULE ORAL 3 TIMES DAILY
Status: DISCONTINUED | OUTPATIENT
Start: 2023-09-06 | End: 2023-09-07

## 2023-09-06 RX ORDER — LIDOCAINE 50 MG/G
2 PATCH TOPICAL
Status: DISCONTINUED | OUTPATIENT
Start: 2023-09-06 | End: 2023-09-07 | Stop reason: HOSPADM

## 2023-09-06 RX ORDER — KETOROLAC TROMETHAMINE 30 MG/ML
15 INJECTION, SOLUTION INTRAMUSCULAR; INTRAVENOUS
Status: COMPLETED | OUTPATIENT
Start: 2023-09-06 | End: 2023-09-06

## 2023-09-06 RX ORDER — KETOROLAC TROMETHAMINE 30 MG/ML
15 INJECTION, SOLUTION INTRAMUSCULAR; INTRAVENOUS EVERY 6 HOURS PRN
Status: DISCONTINUED | OUTPATIENT
Start: 2023-09-06 | End: 2023-09-07

## 2023-09-06 RX ORDER — ACETAMINOPHEN 325 MG/1
650 TABLET ORAL EVERY 4 HOURS PRN
Status: DISCONTINUED | OUTPATIENT
Start: 2023-09-06 | End: 2023-09-07 | Stop reason: HOSPADM

## 2023-09-06 RX ORDER — SODIUM CHLORIDE 0.9 % (FLUSH) 0.9 %
5 SYRINGE (ML) INJECTION
Status: DISCONTINUED | OUTPATIENT
Start: 2023-09-06 | End: 2023-09-07

## 2023-09-06 RX ORDER — PROCHLORPERAZINE EDISYLATE 5 MG/ML
5 INJECTION INTRAMUSCULAR; INTRAVENOUS EVERY 6 HOURS PRN
Status: DISCONTINUED | OUTPATIENT
Start: 2023-09-06 | End: 2023-09-07 | Stop reason: HOSPADM

## 2023-09-06 RX ORDER — IPRATROPIUM BROMIDE AND ALBUTEROL SULFATE 2.5; .5 MG/3ML; MG/3ML
3 SOLUTION RESPIRATORY (INHALATION) EVERY 4 HOURS PRN
Status: DISCONTINUED | OUTPATIENT
Start: 2023-09-06 | End: 2023-09-07 | Stop reason: HOSPADM

## 2023-09-06 RX ORDER — SIMETHICONE 80 MG
1 TABLET,CHEWABLE ORAL 4 TIMES DAILY PRN
Status: DISCONTINUED | OUTPATIENT
Start: 2023-09-06 | End: 2023-09-07 | Stop reason: HOSPADM

## 2023-09-06 RX ORDER — MAG HYDROX/ALUMINUM HYD/SIMETH 200-200-20
30 SUSPENSION, ORAL (FINAL DOSE FORM) ORAL 4 TIMES DAILY PRN
Status: DISCONTINUED | OUTPATIENT
Start: 2023-09-06 | End: 2023-09-07 | Stop reason: HOSPADM

## 2023-09-06 RX ORDER — SERTRALINE HYDROCHLORIDE 25 MG/1
25 TABLET, FILM COATED ORAL DAILY
Status: DISCONTINUED | OUTPATIENT
Start: 2023-09-07 | End: 2023-09-07 | Stop reason: HOSPADM

## 2023-09-06 RX ORDER — MORPHINE SULFATE 4 MG/ML
4 INJECTION, SOLUTION INTRAMUSCULAR; INTRAVENOUS
Status: COMPLETED | OUTPATIENT
Start: 2023-09-06 | End: 2023-09-06

## 2023-09-06 RX ADMIN — LIDOCAINE 2 PATCH: 50 PATCH CUTANEOUS at 05:09

## 2023-09-06 RX ADMIN — ACETAMINOPHEN 1000 MG: 500 TABLET ORAL at 10:09

## 2023-09-06 RX ADMIN — GABAPENTIN 100 MG: 100 CAPSULE ORAL at 10:09

## 2023-09-06 RX ADMIN — MORPHINE SULFATE 4 MG: 4 INJECTION, SOLUTION INTRAMUSCULAR; INTRAVENOUS at 06:09

## 2023-09-06 RX ADMIN — DEXAMETHASONE SODIUM PHOSPHATE 8 MG: 4 INJECTION INTRA-ARTICULAR; INTRALESIONAL; INTRAMUSCULAR; INTRAVENOUS; SOFT TISSUE at 09:09

## 2023-09-06 RX ADMIN — KETOROLAC TROMETHAMINE 15 MG: 30 INJECTION, SOLUTION INTRAMUSCULAR; INTRAVENOUS at 03:09

## 2023-09-06 RX ADMIN — HYDROCODONE BITARTRATE AND ACETAMINOPHEN 1 TABLET: 5; 325 TABLET ORAL at 11:09

## 2023-09-06 RX ADMIN — MORPHINE SULFATE 4 MG: 4 INJECTION, SOLUTION INTRAMUSCULAR; INTRAVENOUS at 07:09

## 2023-09-06 RX ADMIN — METHOCARBAMOL 500 MG: 500 TABLET ORAL at 10:09

## 2023-09-06 RX ADMIN — CYCLOBENZAPRINE 10 MG: 10 TABLET, FILM COATED ORAL at 03:09

## 2023-09-06 NOTE — ED PROVIDER NOTES
Source of History:  Patient    Chief complaint:  Back Pain (Hx of back spasms per EMS, pt states this pain is nothing like his typical pain. )      HPI:  Calvin Smith is a 58 y.o. male presenting with  acute low back pain that began at 9:30 a.m. this morning.  Patient reports that he was getting out of the shower and bent down to dry off his feet, when he began having sudden low back pain.  He describes the pain as bilateral.  States it is centered in his lower back right above his buttocks, states that it radiates up his back at times.  Describes the pain as a throbbing pain.  Denies any radiating pain down his legs.  Reports that when pain initially began, he was able to get in bed and then was unable to move to reach his phone for approximately an hour.  States once he reached his phone, he was able to call his wife and call an ambulance to take him to the hospital.  States that EMS was able to help him up and he was able to walk with assistance to the stretcher. Pain is elicited with certain movements of the spine and legs.  Patient reports that he has a history of back problems, states he has known herniated disc in his lumbar spine that he has managed conservatively over the past few years with exercise, stretching.  States he is not had a flare of pain in approximately 2 years, and has never had a flare this severe.  He denies any new trauma or injury to the area.  He denies any bowel or bladder incontinence, saddle anesthesia.  Denies any fever, chills, nausea, vomiting, abdominal pain, headache, urinary symptoms, diarrhea, focal weakness.    This is the extent to the patients complaints today here in the emergency department.    ROS: As per HPI and below:  Constitutional: No fever.  No chills.  Eyes: No visual changes.   ENT: No sore throat. No ear pain.  Urinary: No abnormal urination.  MSK:  Positive for low back pain.  Integument: No rashes or lesions.    Review of patient's allergies indicates:  No  "Known Allergies    PMH:  As per HPI and below:  Past Medical History:   Diagnosis Date    Anxiety     Colon polyp     Fatty liver     TIA (transient ischemic attack)      Past Surgical History:   Procedure Laterality Date    APPENDECTOMY  2005    COLONOSCOPY  11/2019    EPIDURAL STEROID INJECTION INTO LUMBAR SPINE  2018    INJECTION OF ANESTHETIC AGENT AROUND MEDIAL BRANCH NERVES INNERVATING LUMBAR FACET JOINT  2019       Social History     Tobacco Use    Smoking status: Never    Smokeless tobacco: Never   Substance Use Topics    Drug use: Never       Physical Exam:    /70 (BP Location: Left arm, Patient Position: Lying)   Pulse 73   Temp 97.6 °F (36.4 °C) (Oral)   Resp 16   Ht 5' 10" (1.778 m)   Wt 79.4 kg (175 lb)   SpO2 97%   BMI 25.11 kg/m²   Nursing note and vital signs reviewed.  Constitutional: Pleasant male, laying in bed, grimaces with pain with certain movements. Conversational. No acute distress.  Eyes: No conjunctival injection.  Extraocular muscles are intact.  ENT: Normal phonation.  Chest: No respiratory distress.  Cardiovascular: Regular rate and rhythm, no murmurs, rubs, gallops.  Abdomen: Soft. No tenderness to palpation. No masses, rebound, guarding.  Musculoskeletal: Limited exam secondary to pain, patient position. No visible overlying erythema or deformity. Tenderness to palpation, no crepitus, step offs at the midline lumbar spine. Tenderness at bilateral SI joints. 2+ DP pulses present bilaterally, distal extremities neurovascularly intact. Ankle plantarflexion and dorsiflexion strength intact. Flexion of right hip elicits pain in low back. No pain with external/internal rotation, abduction of bilateral hips.   Skin: No rashes seen.  Good turgor.  No abrasions.  No ecchymoses.  Psych: Appropriate, conversant.        I decided to obtain the patient's medical records.    Results for orders placed or performed during the hospital encounter of 09/06/23   CBC auto differential "   Result Value Ref Range    WBC 7.95 3.90 - 12.70 K/uL    RBC 5.51 4.60 - 6.20 M/uL    Hemoglobin 15.6 14.0 - 18.0 g/dL    Hematocrit 47.2 40.0 - 54.0 %    MCV 86 82 - 98 fL    MCH 28.3 27.0 - 31.0 pg    MCHC 33.1 32.0 - 36.0 g/dL    RDW 12.5 11.5 - 14.5 %    Platelets 316 150 - 450 K/uL    MPV 9.7 9.2 - 12.9 fL    Immature Granulocytes 0.1 0.0 - 0.5 %    Gran # (ANC) 5.6 1.8 - 7.7 K/uL    Immature Grans (Abs) 0.01 0.00 - 0.04 K/uL    Lymph # 1.6 1.0 - 4.8 K/uL    Mono # 0.6 0.3 - 1.0 K/uL    Eos # 0.0 0.0 - 0.5 K/uL    Baso # 0.04 0.00 - 0.20 K/uL    nRBC 0 0 /100 WBC    Gran % 70.6 38.0 - 73.0 %    Lymph % 20.6 18.0 - 48.0 %    Mono % 7.7 4.0 - 15.0 %    Eosinophil % 0.5 0.0 - 8.0 %    Basophil % 0.5 0.0 - 1.9 %    Differential Method Automated    Comprehensive metabolic panel   Result Value Ref Range    Sodium 143 136 - 145 mmol/L    Potassium 4.2 3.5 - 5.1 mmol/L    Chloride 109 95 - 110 mmol/L    CO2 25 23 - 29 mmol/L    Glucose 101 70 - 110 mg/dL    BUN 17 6 - 20 mg/dL    Creatinine 1.1 0.5 - 1.4 mg/dL    Calcium 10.3 8.7 - 10.5 mg/dL    Total Protein 7.8 6.0 - 8.4 g/dL    Albumin 4.5 3.5 - 5.2 g/dL    Total Bilirubin 1.0 0.1 - 1.0 mg/dL    Alkaline Phosphatase 72 55 - 135 U/L    AST 48 (H) 10 - 40 U/L    ALT 73 (H) 10 - 44 U/L    eGFR >60 >60 mL/min/1.73 m^2    Anion Gap 9 8 - 16 mmol/L   Urinalysis, Reflex to Urine Culture Urine, Clean Catch    Specimen: Urine   Result Value Ref Range    Specimen UA Urine, Clean Catch     Color, UA Yellow Yellow, Straw, Tawanna    Appearance, UA Clear Clear    pH, UA 6.0 5.0 - 8.0    Specific Gravity, UA 1.015 1.005 - 1.030    Protein, UA Negative Negative    Glucose, UA Negative Negative    Ketones, UA Negative Negative    Bilirubin (UA) Negative Negative    Occult Blood UA Negative Negative    Nitrite, UA Negative Negative    Urobilinogen, UA Negative <2.0 EU/dL    Leukocytes, UA Negative Negative     Imaging Results              CT Lumbar Spine Without Contrast (Final  result)  Result time 09/06/23 20:05:17      Final result by Owen Ambrosio MD (09/06/23 20:05:17)                   Impression:      No acute abnormality.    Multilevel chronic and degenerative changes.  See above comments.      Electronically signed by: Owen Ambrosio  Date:    09/06/2023  Time:    20:05               Narrative:    EXAMINATION:  CT LUMBAR SPINE WITHOUT CONTRAST    CLINICAL HISTORY:  Low back pain, no red flags, no prior management;    TECHNIQUE:  Low-dose axial, sagittal and coronal reformations are obtained through the lumbar spine.  Contrast was not administered.    COMPARISON:  X-ray 09/06/2023    FINDINGS:  No acute fractures of the lumbar spine.    Moderate disc space narrowing at L5-S1.  Grade 1 spondylolisthesis of L5 on S1.  Minimal grade 1 retrolisthesis of L4 on L5.    Bilateral spondylolysis of L5 with associated facet arthropathy at L5-S1 and L4-5.    L1-2: No significant abnormality.    L2-3: No significant abnormality.    L3-4: Mild diffuse disc protrusion.  Mild ligamentum flavum hypertrophy.  Severe central canal narrowing.  Neural foramen are adequately maintained.    L4-5: Mild diffuse posterior disc osteophyte complex with mild disc protrusion.  Bilateral facet arthropathy.  Mild-moderate central canal narrowing.  Severe right foraminal narrowing.    L5-S1: Bilateral spondylolysis with grade 1 spondylolisthesis and slight uncovering of the disc posteriorly with mild protrusion.  Severe bilateral foraminal narrowing.  Central canal is adequately maintained.    The remaining visualized paravertebral structures demonstrate no pathology.                                       X-Ray Lumbar Spine Ap And Lateral (Final result)  Result time 09/06/23 16:35:47      Final result by Jacque Oropeza MD (09/06/23 16:35:47)                   Impression:      Please see above.      Electronically signed by: Jacque Oropeza  Date:    09/06/2023  Time:    16:35               Narrative:     EXAMINATION:  XR LUMBAR SPINE AP AND LATERAL    CLINICAL HISTORY:  low back pain;    TECHNIQUE:  AP, lateral and spot images were performed of the lumbar spine.    COMPARISON:  None    FINDINGS:  Five lumbar vertebral bodies.  Vertebral body heights are maintained.    Disc space narrowing and endplate changes L5-S1.    Grade 1 anterolisthesis L5-S1 suspected to be on account of pars defects.                        Final result by Jacque Oropeza MD (09/06/23 16:35:47)                   Impression:      Please see above.      Electronically signed by: Jacque Oropeza  Date:    09/06/2023  Time:    16:35               Narrative:    EXAMINATION:  XR LUMBAR SPINE AP AND LATERAL    CLINICAL HISTORY:  low back pain;    TECHNIQUE:  AP, lateral and spot images were performed of the lumbar spine.    COMPARISON:  None    FINDINGS:  Five lumbar vertebral bodies.  Vertebral body heights are maintained.    Disc space narrowing and endplate changes L5-S1.    Grade 1 anterolisthesis L5-S1 suspected to be on account of pars defects.                                      MDM:    Urgent evaluation of a  58 y.o. male with PMH of hyperlipidemia, TIA, prediabetes, chronic bilateral low back pain without sciatica, anxiety presenting for evaluation of low back pain that began at 9:30 a.m. this morning.  Patient afebrile and hemodynamically stable on arrival. Unable to sit up in the bed secondary to pain.      Differential Diagnosis includes, but is not limited to:  Cauda equina syndrome, diskitis/osteomyelitis, epidural/paraspinal abscess, AAA, aortic dissection, post-op/hardware infection, trauma/vertebral fracture, spinal cord injury, disc herniation, spinal stenosis, sciatica, radiculopathy, neoplasm, lumbar muscle strain, muscle spasm, neuropathic pain, UTI/pyelonephritis, nephrolithiasis.    Patient with history of similar pain in the past, he describes it as a spasm, however has never had this severe before.  Xanax and tramadol  "taken prior to arrival with some improvement in pain.  We will begin with Toradol and Flexeril and reassess.  We will also obtain lumbar spine x-ray.  Patient brought MRI records from 2018 that show lumbar disc bulging, these images are not available in Epic for me to see.  Given patient's history, I suspect musculoskeletal etiology of pain.  No saddle anesthesia, bowel or bladder incontinence, radicular pain to indicate cauda equina syndrome.  No indication for emergent MRI.    Lumbar xray: "Five lumbar vertebral bodies.  Vertebral body heights are maintained.     Disc space narrowing and endplate changes L5-S1.     Grade 1 anterolisthesis L5-S1 suspected to be on account of pars defects."    Patient with no improvement after Toradol and Flexeril.  We gave morphine and obtained CT lumbar spine for further evaluation. CT lumbar spine with multilevel disc bulging, central canal narrowing, at L5/S1 notes "L5-S1: Bilateral spondylolysis with grade 1 spondylolisthesis and slight uncovering of the disc posteriorly with mild protrusion.  Severe bilateral foraminal narrowing.  Central canal is adequately maintained." Remainder of workup reassuring, UA within normal limits, no hematuria or WBC to indicate kidney stones, infectious etiology of pain. CBC with no leukocytosis, no anemia.  CMP with no electrolyte abnormality, normal renal function.  Mild elevation of ALT and AST, informed patient of this, it appears ALT have been mildly elevated in the past as well.  No right upper quadrant tenderness or signs/symptoms to indicate further workup of this.    I discussed the results of CT lumbar spine with on-call orthopedic doctor Dr. Lewis.  He states that CT results seem chronic, patient is not having any radicular pain, no concern for cauda equina syndrome, given this he does not believe any acute intervention is indicated.  He recommend steroids for treatment of pain.  Patient has received Toradol, Flexeril, Lidoderm patch, " and morphine. While he did report some improvement in pain,  he is still unable to ambulate, is unable to get out of the bed to attempt to walk secondary to pain.  Continues to deny any radicular pain.  Pain is located only in his lower back.  Given intractable pain, we will admit to hospital medicine for further treatment and workup.  Steroid injection given per orthopedic recommendation, discussed risks and benefits with the patient given diagnosis of prediabetes, he is in agreement with steroid injection.  Discussed with Hospital Medicine, who is in agreement with plan.  I discussed this case with my attending, Dr. Dotson, who was in agreement with plan.      Medical Decision Making  Amount and/or Complexity of Data Reviewed  Independent Historian: EMS  Labs: ordered.  Radiology: ordered.    Risk  Prescription drug management.         ED Course as of 09/07/23 0542   Wed Sep 06, 2023   1834 Attending Attestation:     Physician Attestation Statement for NP/PA:   I have conducted a face to face encounter with this patient in addition to the NP/PA, due to Medical Complexity    Other NP/PA Attestation Additions:    Calvin Smith is a 58 y.o. male with past medical history of previous who presents the ED with acute low back pain which began this afebrile, hemodynamically stable in no acute respiratory distress.  Physical exam with no tenderness to palpation of midline cervical through lumbar spine.  Will begin with IV Toradol and Flexeril.  X-ray showed Grade 1 anterolisthesis L5-S1 suspected to be on account of pars defects.  He required IV morphine thus CT lumbar spine without contrast was ordered.  Case discussed with on-call orthopedic surgery.  Recommended steroids which were started.  Admitted to Hospital Medicine for intractable back pain. [HM]   2002 Patient reassessed and noted mild reduction in his back pain.  Pending CT []   2042 Spoke with Dr. Lewis of orthopedics about CT results, he does not believe  acute intervention is needed. Recommends steroids for treatment, admit if pain is intractable.  [SW]      ED Course User Index  [HM] Dimitrios Dotson MD  [SW] Malinda Cormier PA-C               Diagnostic Impression:    1. Acute low back pain             ED Disposition Condition    Observation Stable                 Please pardon typos or dictation errors, as this note was transcribed using [employer] direct dictation software.      Malinda Cormier PA-C  09/07/23 0047       Dimitrios Dotson MD  09/07/23 0542

## 2023-09-07 VITALS
WEIGHT: 175 LBS | TEMPERATURE: 99 F | RESPIRATION RATE: 18 BRPM | DIASTOLIC BLOOD PRESSURE: 76 MMHG | BODY MASS INDEX: 25.05 KG/M2 | HEART RATE: 94 BPM | HEIGHT: 70 IN | SYSTOLIC BLOOD PRESSURE: 120 MMHG | OXYGEN SATURATION: 94 %

## 2023-09-07 LAB
CRP SERPL-MCNC: 0.8 MG/L (ref 0–8.2)
ERYTHROCYTE [SEDIMENTATION RATE] IN BLOOD: 1 MM/HR (ref 0–10)

## 2023-09-07 PROCEDURE — 25000242 PHARM REV CODE 250 ALT 637 W/ HCPCS: Performed by: PHYSICIAN ASSISTANT

## 2023-09-07 PROCEDURE — 97530 THERAPEUTIC ACTIVITIES: CPT

## 2023-09-07 PROCEDURE — 25000003 PHARM REV CODE 250: Performed by: NURSE PRACTITIONER

## 2023-09-07 PROCEDURE — 36415 COLL VENOUS BLD VENIPUNCTURE: CPT | Performed by: NURSE PRACTITIONER

## 2023-09-07 PROCEDURE — G0378 HOSPITAL OBSERVATION PER HR: HCPCS

## 2023-09-07 PROCEDURE — 97162 PT EVAL MOD COMPLEX 30 MIN: CPT

## 2023-09-07 PROCEDURE — 94761 N-INVAS EAR/PLS OXIMETRY MLT: CPT

## 2023-09-07 PROCEDURE — 85651 RBC SED RATE NONAUTOMATED: CPT | Performed by: NURSE PRACTITIONER

## 2023-09-07 PROCEDURE — 97116 GAIT TRAINING THERAPY: CPT

## 2023-09-07 PROCEDURE — 25000003 PHARM REV CODE 250: Performed by: PHYSICIAN ASSISTANT

## 2023-09-07 PROCEDURE — 86140 C-REACTIVE PROTEIN: CPT | Performed by: NURSE PRACTITIONER

## 2023-09-07 PROCEDURE — 99239 PR HOSPITAL DISCHARGE DAY,>30 MIN: ICD-10-PCS | Mod: ,,, | Performed by: NURSE PRACTITIONER

## 2023-09-07 PROCEDURE — 97165 OT EVAL LOW COMPLEX 30 MIN: CPT

## 2023-09-07 PROCEDURE — 99239 HOSP IP/OBS DSCHRG MGMT >30: CPT | Mod: ,,, | Performed by: NURSE PRACTITIONER

## 2023-09-07 PROCEDURE — 25000003 PHARM REV CODE 250: Performed by: INTERNAL MEDICINE

## 2023-09-07 RX ORDER — IBUPROFEN 600 MG/1
600 TABLET ORAL 4 TIMES DAILY
Status: DISCONTINUED | OUTPATIENT
Start: 2023-09-07 | End: 2023-09-07 | Stop reason: HOSPADM

## 2023-09-07 RX ORDER — IBUPROFEN 600 MG/1
600 TABLET ORAL EVERY 6 HOURS PRN
Status: DISCONTINUED | OUTPATIENT
Start: 2023-09-07 | End: 2023-09-07

## 2023-09-07 RX ORDER — METHOCARBAMOL 500 MG/1
500 TABLET, FILM COATED ORAL EVERY 6 HOURS PRN
Qty: 40 TABLET | Refills: 0 | Status: SHIPPED | OUTPATIENT
Start: 2023-09-07 | End: 2023-09-17

## 2023-09-07 RX ORDER — IBUPROFEN 600 MG/1
600 TABLET ORAL EVERY 6 HOURS PRN
Qty: 40 TABLET | Refills: 0 | Status: SHIPPED | OUTPATIENT
Start: 2023-09-07 | End: 2023-09-24 | Stop reason: SDUPTHER

## 2023-09-07 RX ORDER — LIDOCAINE 50 MG/G
2 PATCH TOPICAL DAILY
Qty: 15 PATCH | Refills: 0 | Status: SHIPPED | OUTPATIENT
Start: 2023-09-07

## 2023-09-07 RX ADMIN — METHOCARBAMOL 500 MG: 500 TABLET ORAL at 08:09

## 2023-09-07 RX ADMIN — LIDOCAINE 2 PATCH: 50 PATCH CUTANEOUS at 04:09

## 2023-09-07 RX ADMIN — IBUPROFEN 600 MG: 600 TABLET, FILM COATED ORAL at 04:09

## 2023-09-07 RX ADMIN — SERTRALINE HYDROCHLORIDE 25 MG: 25 TABLET ORAL at 08:09

## 2023-09-07 RX ADMIN — METHOCARBAMOL 500 MG: 500 TABLET ORAL at 04:09

## 2023-09-07 RX ADMIN — ATORVASTATIN CALCIUM 10 MG: 10 TABLET, FILM COATED ORAL at 08:09

## 2023-09-07 RX ADMIN — FLUTICASONE PROPIONATE 50 MCG: 50 SPRAY, METERED NASAL at 08:09

## 2023-09-07 RX ADMIN — IBUPROFEN 600 MG: 600 TABLET, FILM COATED ORAL at 12:09

## 2023-09-07 RX ADMIN — ASPIRIN 81 MG: 81 TABLET, COATED ORAL at 08:09

## 2023-09-07 RX ADMIN — METHOCARBAMOL 500 MG: 500 TABLET ORAL at 12:09

## 2023-09-07 NOTE — PLAN OF CARE
Problem: Adult Inpatient Plan of Care  Goal: Plan of Care Review  Outcome: Ongoing, Progressing  Goal: Absence of Hospital-Acquired Illness or Injury  Outcome: Ongoing, Progressing  Goal: Optimal Comfort and Wellbeing  Outcome: Ongoing, Progressing     Problem: Infection  Goal: Absence of Infection Signs and Symptoms  Outcome: Ongoing, Progressing     Problem: Fall Injury Risk  Goal: Absence of Fall and Fall-Related Injury  Outcome: Ongoing, Progressing     Problem: Pain Acute  Goal: Acceptable Pain Control and Functional Ability  Outcome: Ongoing, Progressing   POC reviewed with pt who verbalized understanding. AAOx4. VSS on RA. Remains free of falls and injury. Pain managed with PRN meds per MAR. URinal at bedside. Resting between care. Call light in reach and rounds made for safety. Will continue to monitor.

## 2023-09-07 NOTE — PT/OT/SLP PROGRESS
Physical Therapy Treatment    Patient Name:  Calvin Smith   MRN:  [id number]    Recommendations:     Discharge Recommendations: outpatient PT  Discharge Equipment Recommendations: walker, rolling  Barriers to discharge: None    Assessment:     Calvin Smith is a 58 y.o. male admitted with a medical diagnosis of Acute low back pain.  He presents with the following impairments/functional limitations: impaired self care skills, impaired functional mobility, gait instability, impaired balance, decreased coordination, decreased lower extremity function, decreased safety awareness, pain, abnormal tone, decreased ROM, impaired joint extensibility.    Patient with continued progress towards goals, able to ambulate >300 ft with RW and short distances without RW. Stair training performed with pt able to perform with BUE support. Discussed gentle activity as pain allows and changing position for comfort and to avoid stiffness. Rec for dc to home with OP PT.     Rehab Prognosis: Good; patient would benefit from acute skilled PT services to address these deficits and reach maximum level of function.    Recent Surgery: * No surgery found *      Plan:     During this hospitalization, patient to be seen 4 x/week to address the identified rehab impairments via gait training, therapeutic activities, therapeutic exercises and progress toward the following goals:    Plan of Care Expires:  09/14/23    Subjective     Chief Complaint: Some spasm with sitting upright  Patient/Family Comments/goals: Goal to go home, asking about progress towards higher level goals, reports he has had spondylolysis for years and wondering if it's worsened; Patient agreeable to PT treatment.  Pain/Comfort:  Pain Rating 1:  (improved since earlier)  Location - Side 1: Bilateral  Location - Orientation 1: lower  Location 1: back  Pain Addressed 1: Reposition, Distraction, Cessation of Activity  Pain Rating Post-Intervention 1:  (increased with sitting  upright)      Objective:     Communicated with GWEN Parrish prior to session.  Patient found supine with peripheral IV upon PT entry to room.     General Precautions: Standard, fall  Orthopedic Precautions: N/A  Braces: N/A  Respiratory Status: Room air     Patient donned non slip socks for OOB mobility.    Functional Mobility:  Bed Mobility:     Supine to Sit: independence  Transfers:     Sit to Stand:  modified independence with standard walker  From EOB and chair  Use of UE to support  Gait: 2x400 ft with RW and supervision-mod(I).   Able to navigate turns more quickly and without onset of pain.   Able to take a few steps without RW, but reports increasing pain without UE support.   Encouraged to use RW to control pain when moving during acute back pain phase.    Stairs:  Pt ascended/descended 4 stair(s) with No Assistive Device with bilateral handrails with Supervision or Set-up Assistance.   Performed with reciprocal pattern  Reports he will have his son and spouse to assist      AM-PAC 6 CLICK MOBILITY  Turning over in bed (including adjusting bedclothes, sheets and blankets)?: 4  Sitting down on and standing up from a chair with arms (e.g., wheelchair, bedside commode, etc.): 4  Moving from lying on back to sitting on the side of the bed?: 4  Moving to and from a bed to a chair (including a wheelchair)?: 4  Need to walk in hospital room?: 3  Climbing 3-5 steps with a railing?: 3  Basic Mobility Total Score: 22       Treatment & Education:  Continued PNE education including allowing body's acute pain to calm down but engaging in gentle activity as his body allows  Instructed to avoid forceful or significantly painful motions , checo twisting motions for now    Patient left sitting edge of bed with all lines intact, call button in reach, PA notified, and spouse present..    GOALS:   Multidisciplinary Problems       Physical Therapy Goals          Problem: Physical Therapy    Goal Priority Disciplines Outcome Goal  Variances Interventions   Physical Therapy Goal     PT, PT/OT Ongoing, Progressing     Description: Goals to be met by: 23    Patient will increase functional independence with mobility by performin. Sit<>stand with supervision with LRAD.  2. Gait x 100 feet with supervision with LRAD.  3. Ascend/descend 3 step(s) with least restrictive assistive device and CGA.  4. TUG score of <13 sec to indicate not at increased risk for falls.                        Time Tracking:     PT Received On: 23  PT Start Time: 1701     PT Stop Time: 1718  PT Total Time (min): 17 min     Billable Minutes: Gait Training 17    Treatment Type: Treatment  PT/PTA: PT     Number of PTA visits since last PT visit: 0     2023

## 2023-09-07 NOTE — HOSPITAL COURSE
Mr Simpson was admitted for back pain severe enough to limit his mobility. He described the pain as lower back spasms and denies any recent trauma. He has not had any back pain in the past 4 years. Diagnostic imaging shows lumbar degenerative disc changes and nerve impingement.  PT/OT evaluated patient and rec OP rehab and walker. Patient referred to back and spine clinic. He also was referred to Dr Carrasco who is familiar with his previous spinal hisotyr. While in the hospital his pain was managed with NSAIDS and muscle relaxants. Patient in agreement to discharge plan.

## 2023-09-07 NOTE — CARE UPDATE
09/07/23 0747   PRE-TX-O2   Device (Oxygen Therapy) room air   SpO2 96 %   Pulse Oximetry Type Intermittent   $ Pulse Oximetry - Multiple Charge Pulse Oximetry - Multiple

## 2023-09-07 NOTE — ASSESSMENT & PLAN NOTE
Calvin Smith has a history of herniated disc, follows outpatient, presents today for acute severe low back pain that began when he bent over to towel off after getting out of the shower    - given IV morphine, IV toradol, IM dexamethasone   - CT lumbar spine shows no acute abnormalities  - start gabapentin and tylenol and robaxin, toradol PRN and norco PRN  - PTOT  - outpatient referral to spine clinic

## 2023-09-07 NOTE — PT/OT/SLP EVAL
Occupational Therapy   Evaluation    Name: Calvin Smith  MRN: [id number]  Admitting Diagnosis: Acute low back pain  Recent Surgery: * No surgery found *      Recommendations:     Discharge Recommendations: outpatient PT  Discharge Equipment Recommendations:  walker, rolling  Barriers to discharge:  None    Assessment:     Calvin Smith is a 58 y.o. male with a medical diagnosis of Acute low back pain.  He presents with back pain. Performance deficits affecting function: impaired self care skills, impaired functional mobility, gait instability, impaired balance, pain, decreased lower extremity function, impaired endurance.  Pt eager and agreeable to participating in therapy upon arrival to room.  Pt demonstrates strength and ROM in (B) UE needed for ADLs that is WNL.  Pt able to perform bed mobility with SBA and increased time.  Pt able to perform sit <> stand transfer with SBA, RW, and height of bed slightly elevated.  Once standing pt able to maintain balance without assist; however, declined taking steps stating he felt like he was on the verge of having another episode of back pain.    Overall, pt tolerated therapy well, but remained anxious about back pain throughout session.  Back pain appears to be pt's main limiting factor during daily routine at this time.  Pt would benefit from skilled OT services to address problems listed above and increase independence with ADLs.  Outpatient PT to address back pain is recommended upon discharge from hospital.        Rehab Prognosis: Good; patient would benefit from acute skilled OT services to address these deficits and reach maximum level of function.       Plan:     Patient to be seen 3 x/week to address the above listed problems via self-care/home management, therapeutic activities, therapeutic exercises, neuromuscular re-education  Plan of Care Expires:    Plan of Care Reviewed with: patient    Subjective     Chief Complaint: back pain  Patient/Family Comments/goals:  Resume PLOF; decrease back pain    Occupational Profile:  Living Environment: Pt lives with wife in [facility], 3 small [address]KE.  Bathroom has walk-in shower with built in bench (just redid it).  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   *Pt previously has gone to OP PT for back; states last time he went was 4 years ago.  Reports he used to do his presecribed exercises and stretches for his back, but has not been consistent in doing them recently   Equipment Used at Home: none  Assistance upon Discharge: Wife able to provide assist    Pain/Comfort:  Pain Rating 1:  (pt reported 40-50% improvement with back pain from yesterday; reported minimal pain prior to moving)  Pain Rating Post-Intervention 1:  (pt comfortable at rest, but held off on taking steps because he felt like it was about to cause increased pain or an episode)    Patients cultural, spiritual, Religion conflicts given the current situation: no    Objective:     Communicated with: RN prior to session.  Patient found HOB elevated with peripheral IV upon OT entry to room.    General Precautions: Standard, fall  Orthopedic Precautions: N/A  Braces: N/A  Respiratory Status: Room air    Occupational Performance:    Bed Mobility:    Supine <> sit: SBA  Scooting: SBA  Sit <> Supine: SBA  Notes: Increased time required with all activity     Functional Mobility/Transfers:  Sit <> Stand: SBA, RW x 1 trial from EOB  Height of bed slightly elevated  Left lateral lean observed  Functional Mobility: Pt able to slightly shift weight while standing, but unable to take steps 2* felt as though he was on the verge of experiencing another episode with back    Activities of Daily Living:  Feeding:  Independent for feeding himself crackers while supine.    Cognitive/Visual  Perceptual:  Cognitive/Psychosocial Skills:    -       Oriented to: Person, Place, Time, and Situation   -       Follows Commands/attention: Follows 100% of multi step commands  -       Communication: clear/fluent  -       Memory: No Deficits noted  -       Safety awareness/insight to disability: intact   -       Mood/Affect/Coping skills/emotional control: Cooperative, Anxious, and Pleasant    Physical Exam:  Postural examination/scapula alignment:    -       No postural abnormalities identified  Skin integrity: Visible skin intact  Edema:  None noted  Sensation: -       Intact  Motor Planning: -       WNL  Dominant hand: -       Right  Upper Extremity Range of Motion: As observed through functional tasks   -       Right Upper Extremity: WNL  -       Left Upper Extremity: WNL  Upper Extremity Strength: As observed through functional tasks  -       Right Upper Extremity: WNL  -       Left Upper Extremity: WNL   Strength: 5/5 both hands  Fine Motor Coordination: Intact  Gross motor coordination: WFL  Balance: Sitting- Independent; Standing- SBA    AMPAC 6 Click ADL:  AMPAC Total Score: 20    Treatment & Education:  *Pt educated on:   -role of OT in acute care setting  -log rolling technique  -use of adaptive equipment during ADLs  *Pt performed supine <> sit transfer; cues for technique provided  *Pt performed sit <> stand transfer; cues for technique and RW management provided  *POC reviewed with pt    Patient left supine with call button in reach and PT (Luli) notified    GOALS:   Multidisciplinary Problems       Occupational Therapy Goals          Problem: Occupational Therapy    Goal Priority Disciplines Outcome Interventions   Occupational Therapy Goal     OT, PT/OT Ongoing, Progressing    Description: Goals to be met by: 9/14/2023    Patient will increase functional independence with ADLs by performing:    UE Dressing with [place].  LE Dressing with [place].  Grooming while standing at sink with  [place].  Toileting from toilet with Modified [place] for hygiene and clothing management.   Toilet transfer to toilet with Modified [place].                         History:     Past Medical History:   Diagnosis Date    Anxiety     Colon polyp     Fatty liver     TIA (transient ischemic attack)          Past Surgical History:   Procedure Laterality Date    APPENDECTOMY  2005    COLONOSCOPY  11/2019    EPIDURAL STEROID INJECTION INTO LUMBAR SPINE  2018    INJECTION OF ANESTHETIC AGENT AROUND MEDIAL BRANCH NERVES INNERVATING LUMBAR FACET JOINT  2019       Time Tracking:     OT Date of Treatment: 09/07/23  OT Start Time: 0940  OT Stop Time: 1006  OT Total Time (min): 26 min    Billable Minutes:Evaluation 16  Therapeutic Activity 8    RAYMOND Valencia  9/7/2023

## 2023-09-07 NOTE — NURSING
Discharge home with wife. Medications sent to local pharmacy. Patient and family in agreement with discharge.

## 2023-09-07 NOTE — PT/OT/SLP EVAL
Physical Therapy Evaluation and Treatment    Patient Name:  Calvin Smith   MRN:  [id number]    Recommendations:     Discharge Recommendations: outpatient PT   Discharge Equipment Recommendations: walker, rolling   Barriers to discharge: Inaccessible home    Assessment:     Calvin Smith is a 58 y.o. male admitted with a medical diagnosis of Acute low back pain.  He presents with the following impairments/functional limitations: impaired self care skills, impaired functional mobility, gait instability, impaired balance, decreased coordination, decreased lower extremity function, decreased safety awareness, pain, abnormal tone, decreased ROM, impaired joint extensibility.    Patient evaluated by PT and goals established. Patient reports improvement in baseline pain, however demo's significant guarding of trunk with all movement d/t fear of setting off muscle spasm pain (occurs with trunk rotation and flexion>extension). Gait performed with RW without overt LOB, difficulty with turn navigation d/t trunk movement. PT will continue to follow and progress as tolerated. Rec for dc to home with OP PT, requires RW for mobility.    Rehab Prognosis: Good; patient would benefit from acute skilled PT services to address these deficits and reach maximum level of function.    Recent Surgery: * No surgery found *      Plan:     During this hospitalization, patient to be seen 4 x/week to address the identified rehab impairments via gait training, therapeutic activities, therapeutic exercises and progress toward the following goals:    Plan of Care Expires:  09/14/23    Subjective     Chief Complaint: Worried about causing spasms in back, reports some improvement but still can't move as well as normally  Patient/Family Comments/goals: Goal to be able to move well and get in and out of a car; Patient agreeable to evaluation.  Pain/Comfort:  Pain Rating 1:  (reports improved pain at rest, feels like hairs breadth away from spasms with  movement)  Location - Side 1: Bilateral  Location - Orientation 1: lower  Location 1: back  Pain Addressed 1: Reposition, Distraction, Cessation of Activity  Pain Rating Post-Intervention 1:  (appears comfortable at rest)    Patients cultural, spiritual, Bahai conflicts given the current situation: no    Living Environment:  Pt lives with his spouse in a single story home with 3 steps to enter and no handrail(s) (threshold step then 2 steps).   Upon discharge, patient will have assistance from his spouse.  Prior level of function:  Ambulation: Indep  ADL's: Indep  IADLs: Indep  Falls: Denies - onset of pain when bending forward, laid down and wasn't able to move d/t severity of spasms   Equipment used at home: none.     Objective:     Communicated with GWEN Parrish prior to session.  Patient found supine with legs supported in hook lying with peripheral IV, bed alarm  upon PT entry to room.    General Precautions: Standard, fall  Orthopedic Precautions:N/A   Braces: N/A  Respiratory Status: Room air    Patient donned non slip socks and gait belt for OOB mobility.    Exams:  Cognition:   Patient is oriented x4.  Pt follows approximately 100% of one step commands.    Mood: Pleasant and cooperative, anxious affect  Safety Awareness: Good  Musculoskeletal:  BMI: 25.11  Posture:  Guarding of trunk, minimal lumbar or thoracic flexion/extension or rotation  LE ROM/Strength:   R ROM: WFL  L ROM: WFL  R Strength: WFL  L Strength: WFL  Trunk ROM:  Significantly limited flexion/extension (extension more limited than flexion, flexion more painful), significantly limited rotation, limited lateral flexion (R more painful than L)  Neuromuscular:  Sensation: Intact to light touch bilateral Les, denies paresthesias.  Tone/Reflexes: Guarding of trunk with minimal movement of lumbar or thoracic spine with functional mobility  Balance:   Static sitting: Good  Static standing: Fair  Dynamic standing: Fair  Visual-vestibular: No  impairments identified with functional mobility. No formal testing performed.  Integument: Visible skin intact  Cardiopulmonary:  Edema: None noted     Functional Mobility:  Bed Mobility:     Supine to Sit: modified independence  Sit to Supine: modified independence - cueing for appropriate log roll technique, pt with tendency to initiate roll prior to legs supported on bed  Transfers:     Sit to Stand:  modified independence with rolling walker  From EOB   Gait: 2x20 ft with RW and SBA.   Pt with guarding of trunk, maintains slight forward flexion at hips and increase WB thru UE.   Without UE support, pt reports increased pain in back.   Difficulty with turns d/t slight rotation at trunk with pt reporting near spasms with that movement.       AM-PAC 6 CLICK MOBILITY  Total Score:21       Treatment & Education:  TA:  Instructed in bed mobility including log roll for return to bed  Increased time spent introducing pain neuroscience education.  educated that pain is a psychological response to a physical stimulus, that pain does not always mean injury  educated on phenomenon of muscle guarding and importance to not push through this into painful spasm; however, educated on and encouraged movement as tolerated  educated on fallacy of pathoanatomic model of imaging predicting pain      Patient left  on stretcher with transporter  with all lines intact and RN notified.    GOALS:   Multidisciplinary Problems       Physical Therapy Goals          Problem: Physical Therapy    Goal Priority Disciplines Outcome Goal Variances Interventions   Physical Therapy Goal     PT, PT/OT Ongoing, Progressing     Description: Goals to be met by: 23    Patient will increase functional independence with mobility by performin. Sit<>stand with supervision with LRAD.  2. Gait x 100 feet with supervision with LRAD.  3. Ascend/descend 3 step(s) with least restrictive assistive device and CGA.  4. TUG score of <13 sec to indicate not  at increased risk for falls.                        History:     Past Medical History:   Diagnosis Date    Anxiety     Colon polyp     Fatty liver     TIA (transient ischemic attack)        Past Surgical History:   Procedure Laterality Date    APPENDECTOMY  2005    COLONOSCOPY  11/2019    EPIDURAL STEROID INJECTION INTO LUMBAR SPINE  2018    INJECTION OF ANESTHETIC AGENT AROUND MEDIAL BRANCH NERVES INNERVATING LUMBAR FACET JOINT  2019       Time Tracking:     PT Received On: 09/07/23  PT Start Time: 1248     PT Stop Time: 1304  PT Total Time (min): 16 min     Billable Minutes: Evaluation 8 and Therapeutic Activity 8      09/07/2023

## 2023-09-07 NOTE — SUBJECTIVE & OBJECTIVE
Past Medical History:   Diagnosis Date    Anxiety     Colon polyp     Fatty liver     TIA (transient ischemic attack)        Past Surgical History:   Procedure Laterality Date    APPENDECTOMY  2005    COLONOSCOPY  11/2019    EPIDURAL STEROID INJECTION INTO LUMBAR SPINE  2018    INJECTION OF ANESTHETIC AGENT AROUND MEDIAL BRANCH NERVES INNERVATING LUMBAR FACET JOINT  2019       Review of patient's allergies indicates:  No Known Allergies    No current facility-administered medications on file prior to encounter.     Current Outpatient Medications on File Prior to Encounter   Medication Sig    aspirin (ECOTRIN) 81 MG EC tablet Take 1 tablet (81 mg total) by mouth once daily.    atorvastatin (LIPITOR) 10 MG tablet Take 1 tablet (10 mg total) by mouth once daily.    brompheniramine-pseudoeph-DM (BROMFED DM) 2-30-10 mg/5 mL Syrp Take 5 mLs by mouth every 6 (six) hours.    fluticasone propionate (FLONASE) 50 mcg/actuation nasal spray SHAKE LIQUID AND USE 1 SPRAY IN EACH NOSTRIL TWICE DAILY    metFORMIN (GLUCOPHAGE-XR) 500 MG ER 24hr tablet Take 1 tablet (500 mg total) by mouth daily with breakfast.    scopolamine (TRANSDERM-SCOP) 1.3-1.5 mg (1 mg over 3 days) Place 1 patch onto the skin every 72 hours.    sertraline (ZOLOFT) 25 MG tablet Take 1 tablet (25 mg total) by mouth once daily.    [DISCONTINUED] cefdinir (OMNICEF) 300 MG capsule Take 300 mg by mouth 2 (two) times daily.     Family History       Problem Relation (Age of Onset)    Leukemia Father    No Known Problems Mother, Sister, Brother, Sister, Brother          Tobacco Use    Smoking status: Never    Smokeless tobacco: Never   Substance and Sexual Activity    Alcohol use: Not on file    Drug use: Never    Sexual activity: Yes     Review of Systems   Constitutional:  Positive for activity change. Negative for fever.   Respiratory:  Negative for cough and shortness of breath.    Cardiovascular:  Negative for chest pain.   Gastrointestinal:  Negative for abdominal  pain, nausea and vomiting.   Genitourinary:  Negative for difficulty urinating.   Musculoskeletal:  Positive for back pain and gait problem. Negative for arthralgias.   Skin:  Negative for color change.   Neurological:  Negative for dizziness, syncope, weakness, light-headedness and headaches.   Psychiatric/Behavioral:  Negative for agitation and confusion.      Objective:     Vital Signs (Most Recent):  Temp: 98 °F (36.7 °C) (09/06/23 1404)  Pulse: 73 (09/06/23 2000)  Resp: 16 (09/06/23 1953)  BP: 114/73 (09/06/23 2000)  SpO2: 98 % (09/06/23 2000) Vital Signs (24h Range):  Temp:  [98 °F (36.7 °C)] 98 °F (36.7 °C)  Pulse:  [73-86] 73  Resp:  [16-18] 16  SpO2:  [98 %] 98 %  BP: (111-134)/(73-88) 114/73     Weight: 81.6 kg (180 lb)  Body mass index is 25.83 kg/m².     Physical Exam  Constitutional:       General: He is not in acute distress.     Appearance: He is well-developed.   HENT:      Head: Normocephalic and atraumatic.   Cardiovascular:      Rate and Rhythm: Normal rate and regular rhythm.   Pulmonary:      Effort: Pulmonary effort is normal. No respiratory distress.   Abdominal:      General: There is no distension.      Palpations: Abdomen is soft.      Tenderness: There is no abdominal tenderness. There is no rebound.   Musculoskeletal:         General: Normal range of motion.      Right lower leg: No edema.      Left lower leg: No edema.   Skin:     General: Skin is warm and dry.   Neurological:      General: No focal deficit present.      Mental Status: He is alert and oriented to person, place, and time.      Sensory: No sensory deficit.   Psychiatric:         Mood and Affect: Mood normal.         Behavior: Behavior normal.                Significant Labs: All pertinent labs within the past 24 hours have been reviewed.    Significant Imaging: I have reviewed all pertinent imaging results/findings within the past 24 hours.

## 2023-09-07 NOTE — PLAN OF CARE
Problem: Occupational Therapy  Goal: Occupational Therapy Goal  Description: Goals to be met by: 9/14/2023    Patient will increase functional independence with ADLs by performing:    UE Dressing with [place].  LE Dressing with [place].  Grooming while standing at sink with [place].  Toileting from toilet with Modified [place] for hygiene and clothing management.   Toilet transfer to toilet with Modified [place].    Outcome: Ongoing, Progressing     OT evaluation complete and POC established.  Pt able to perform bed mobility with SBA and sit <> stand transfer with SBA, RW.  Pt unable to take steps during time of evaluation 2* pain.      PTA pt reports being (I) with ADLs, iADLs, and functional mobility.  Pt also goes golfing frequently.  Pt would benefit from skilled OT services to address deficits and maximize independence with ADLs.     OP PT to address back pain is recommended upon discharge from hospital.  DME rec: RAYMOND Foster  9/7/2023

## 2023-09-07 NOTE — DISCHARGE SUMMARY
[facility]urg [address] Medicine  Discharge Summary      Patient Name: Calvin Smith  MRN: [id number]  MATTHIAS: [id number]  Patient Class: OP- Observation  Admission Date: 9/6/2023  Hospital Length of Stay: 0 days  Discharge Date and Time:  09/07/2023 6:24 PM  Attending Physician: Adrián Landrum MD   Discharging Provider: Cele Baron DNP  Primary Care Provider: Dc Fitzgerald MD    Primary Care Team: Networked reference to record PCT     HPI:   Calvin Smith is a 58M with HLD and chronic low back pain who presents for acute low back pain that started this morning when he bent over to dry his feet when he got out the shower. No fall or trauma, pain does not radiate down his legs. Described as achy and throbby that wraps around his entire low back. Pain has been so severe he cannot walk. He has a history of herniated disc but has not had trouble with it for years. Denies bowel and bladder incontinence, saddle anesthesia       In ER: vitals stable, AST/ALT slightly elevated; CT lumbar spine unremarkable; er spoke with ortho who recommended dexamethasone and fu outpt      * No surgery found *      Hospital Course:   Mr Simpson was admitted for back pain severe enough to limit his mobility. He described the pain as lower back spasms and denies any recent trauma. He has not had any back pain in the past 4 years. Diagnostic imaging shows lumbar degenerative disc changes and nerve impingement.  PT/OT evaluated patient and rec OP rehab and walker. Patient referred to back and spine clinic. He also was referred to Dr Carrasco who is familiar with his previous spinal hisotyr. While in the hospital his pain was managed with NSAIDS and muscle relaxants. Patient in agreement to discharge plan.        Goals of Care Treatment Preferences:  Code Status: Full Code      Consults:     Orthopedic  * Acute low back pain  Calvin Smith has a history of herniated disc, follows outpatient, presents today for acute  severe low back pain that began when he bent over to towel off after getting out of the shower    - given IV morphine, IV toradol, IM dexamethasone   - CT lumbar spine shows no acute abnormalities  - NSAIDs  and robaxin, toradol PRN and norco PRN  - PTOT  - outpatient referral to spine clinic          Final Active Diagnoses:    Diagnosis Date Noted POA    PRINCIPAL PROBLEM:  Acute low back pain [M54.50] 09/06/2023 Yes    Herniated lumbar intervertebral disc [M51.26] 02/29/2020 Yes    Neuroforaminal stenosis of lumbar spine [M48.061] 02/29/2020 Yes    Anxiety [F41.9] 01/03/2020 Yes    Chronic bilateral back pain [M54.9, G89.29] 01/03/2020 Yes    Dyslipidemia [E78.5] 12/31/2019 Yes    Other insomnia not due to a substance or known physiological condition [F51.09] 12/31/2019 Yes      Problems Resolved During this Admission:       Discharged Condition: fair    Disposition: Home or Self Care    Follow Up:    Patient Instructions:      Ambulatory referral/consult to Back & Spine Clinic   Standing Status: Future   Referral Priority: Routine Referral Type: Consultation   Referral Reason: Specialty Services Required   Number of Visits Requested: 1     Ambulatory referral/consult to Physical/Occupational Therapy   Standing Status: Future   Referral Priority: Routine Referral Type: Physical Medicine   Referral Reason: Specialty Services Required   Number of Visits Requested: 1     Ambulatory referral/consult to Orthopedics   Standing Status: Future   Referral Priority: Urgent Referral Type: Consultation   Referred to Provider: MEHRAN MALONEY Requested Specialty: Orthopedic Surgery   Number of Visits Requested: 1     Notify your health care provider if you experience any of the following:  temperature >100.4     Notify your health care provider if you experience any of the following:  severe uncontrolled pain     Activity as tolerated       Significant Diagnostic Studies: N/A    Pending Diagnostic Studies:     None          Medications:  Reconciled Home Medications:      Medication List      START taking these medications    ibuprofen 600 MG tablet  Commonly known as: ADVIL,MOTRIN  Take 1 tablet (600 mg total) by mouth every 6 (six) hours as needed for Pain.     LIDOcaine 5 %  Commonly known as: LIDODERM  Place 2 patches onto the skin once daily. Remove & Discard patch within 12 hours or as directed by MD     methocarbamoL 500 MG Tab  Commonly known as: ROBAXIN  Take 1 tablet (500 mg total) by mouth every 6 (six) hours as needed (back spasms).     walker Misc  1 each by Misc.(Non-Drug; Combo Route) route once. Rolling walker for 1 dose        CONTINUE taking these medications    aspirin 81 MG EC tablet  Commonly known as: ECOTRIN  Take 1 tablet (81 mg total) by mouth once daily.     atorvastatin 10 MG tablet  Commonly known as: LIPITOR  Take 1 tablet (10 mg total) by mouth once daily.     fluticasone propionate 50 mcg/actuation nasal spray  Commonly known as: FLONASE  SHAKE LIQUID AND USE 1 SPRAY IN EACH NOSTRIL TWICE DAILY     metFORMIN 500 MG ER 24hr tablet  Commonly known as: GLUCOPHAGE-XR  Take 1 tablet (500 mg total) by mouth daily with breakfast.     scopolamine 1.3-1.5 mg (1 mg over 3 days)  Commonly known as: TRANSDERM-SCOP  Place 1 patch onto the skin every 72 hours.     sertraline 25 MG tablet  Commonly known as: ZOLOFT  Take 1 tablet (25 mg total) by mouth once daily.        STOP taking these medications    brompheniramine-pseudoeph-DM 2-30-10 mg/5 mL Syrp  Commonly known as: BROMFED DM            Indwelling Lines/Drains at time of discharge:   Lines/Drains/Airways     None                 Time spent on the discharge of patient: 65 minutes         Cele Baron DNP  Department of Hospital Medicine  [facility] ([address])

## 2023-09-07 NOTE — NURSING
Nurses Note -- 4 Eyes      9/7/2023   2:24 AM      Skin assessed during: Admit      [x] No Altered Skin Integrity Present    []Prevention Measures Documented      [] Yes- Altered Skin Integrity Present or Discovered   [] LDA Added if Not in Epic (Describe Wound)   [] New Altered Skin Integrity was Present on Admit and Documented in LDA   [] Wound Image Taken    Wound Care Consulted? No    Attending Nurse:  Lorraine Diallo RN/Staff Member:  GWEN Hampton

## 2023-09-07 NOTE — ASSESSMENT & PLAN NOTE
Calvin Smith has a history of herniated disc, follows outpatient, presents today for acute severe low back pain that began when he bent over to towel off after getting out of the shower    - given IV morphine, IV toradol, IM dexamethasone   - CT lumbar spine shows no acute abnormalities  - NSAIDs  and robaxin, toradol PRN and norco PRN  - PTOT  - outpatient referral to spine clinic

## 2023-09-07 NOTE — H&P
[facility] Medicine  History & Physical    Patient Name: Calvin Smith  MRN: [id number]  Patient Class: OP- Observation  Admission Date: 9/6/2023  Attending Physician: Adrián Landrum MD   Primary Care Provider: Dc Fitzgerald MD         Patient information was obtained from patient, past medical records and ER records.     Subjective:     Principal Problem:Acute low back pain    Chief Complaint:   Chief Complaint   Patient presents with    Back Pain     Hx of back spasms per EMS, pt states this pain is nothing like his typical pain.         HPI: Calvin Smith is a 58M with HLD and chronic low back pain who presents for acute low back pain that started this morning when he bent over to dry his feet when he got out the shower. No fall or trauma, pain does not radiate down his legs. Described as achy and throbby that wraps around his entire low back. Pain has been so severe he cannot walk. He has a history of herniated disc but has not had trouble with it for years. Denies bowel and bladder incontinence, saddle anesthesia       In ER: vitals stable, AST/ALT slightly elevated; CT lumbar spine unremarkable; er spoke with ortho who recommended dexamethasone and fu outpt      Past Medical History:   Diagnosis Date    Anxiety     Colon polyp     Fatty liver     TIA (transient ischemic attack)        Past Surgical History:   Procedure Laterality Date    APPENDECTOMY  2005    COLONOSCOPY  11/2019    EPIDURAL STEROID INJECTION INTO LUMBAR SPINE  2018    INJECTION OF ANESTHETIC AGENT AROUND MEDIAL BRANCH NERVES INNERVATING LUMBAR FACET JOINT  2019       Review of patient's allergies indicates:  No Known Allergies    No current facility-administered medications on file prior to encounter.     Current Outpatient Medications on File Prior to Encounter   Medication Sig    aspirin (ECOTRIN) 81 MG EC tablet Take 1 tablet (81 mg total) by mouth once daily.    atorvastatin (LIPITOR) 10 MG  tablet Take 1 tablet (10 mg total) by mouth once daily.    brompheniramine-pseudoeph-DM (BROMFED DM) 2-30-10 mg/5 mL Syrp Take 5 mLs by mouth every 6 (six) hours.    fluticasone propionate (FLONASE) 50 mcg/actuation nasal spray SHAKE LIQUID AND USE 1 SPRAY IN EACH NOSTRIL TWICE DAILY    metFORMIN (GLUCOPHAGE-XR) 500 MG ER 24hr tablet Take 1 tablet (500 mg total) by mouth daily with breakfast.    scopolamine (TRANSDERM-SCOP) 1.3-1.5 mg (1 mg over 3 days) Place 1 patch onto the skin every 72 hours.    sertraline (ZOLOFT) 25 MG tablet Take 1 tablet (25 mg total) by mouth once daily.    [DISCONTINUED] cefdinir (OMNICEF) 300 MG capsule Take 300 mg by mouth 2 (two) times daily.     Family History       Problem Relation (Age of Onset)    Leukemia Father    No Known Problems Mother, Sister, Brother, Sister, Brother          Tobacco Use    Smoking status: Never    Smokeless tobacco: Never   Substance and Sexual Activity    Alcohol use: Not on file    Drug use: Never    Sexual activity: Yes     Review of Systems   Constitutional:  Positive for activity change. Negative for fever.   Respiratory:  Negative for cough and shortness of breath.    Cardiovascular:  Negative for chest pain.   Gastrointestinal:  Negative for abdominal pain, nausea and vomiting.   Genitourinary:  Negative for difficulty urinating.   Musculoskeletal:  Positive for back pain and gait problem. Negative for arthralgias.   Skin:  Negative for color change.   Neurological:  Negative for dizziness, syncope, weakness, light-headedness and headaches.   Psychiatric/Behavioral:  Negative for agitation and confusion.      Objective:     Vital Signs (Most Recent):  Temp: 98 °F (36.7 °C) (09/06/23 1404)  Pulse: 73 (09/06/23 2000)  Resp: 16 (09/06/23 1953)  BP: 114/73 (09/06/23 2000)  SpO2: 98 % (09/06/23 2000) Vital Signs (24h Range):  Temp:  [98 °F (36.7 °C)] 98 °F (36.7 °C)  Pulse:  [73-86] 73  Resp:  [16-18] 16  SpO2:  [98 %] 98 %  BP:  (111-134)/(73-88) 114/73     Weight: 81.6 kg (180 lb)  Body mass index is 25.83 kg/m².     Physical Exam  Constitutional:       General: He is not in acute distress.     Appearance: He is well-developed.   HENT:      Head: Normocephalic and atraumatic.   Cardiovascular:      Rate and Rhythm: Normal rate and regular rhythm.   Pulmonary:      Effort: Pulmonary effort is normal. No respiratory distress.   Abdominal:      General: There is no distension.      Palpations: Abdomen is soft.      Tenderness: There is no abdominal tenderness. There is no rebound.   Musculoskeletal:         General: Normal range of motion.      Right lower leg: No edema.      Left lower leg: No edema.   Skin:     General: Skin is warm and dry.   Neurological:      General: No focal deficit present.      Mental Status: He is alert and oriented to person, place, and time.      Sensory: No sensory deficit.   Psychiatric:         Mood and Affect: Mood normal.         Behavior: Behavior normal.                Significant Labs: All pertinent labs within the past 24 hours have been reviewed.    Significant Imaging: I have reviewed all pertinent imaging results/findings within the past 24 hours.    Assessment/Plan:     * Acute low back pain  Calvin Smith has a history of herniated disc, follows outpatient, presents today for acute severe low back pain that began when he bent over to towel off after getting out of the shower    - given IV morphine, IV toradol, IM dexamethasone   - CT lumbar spine shows no acute abnormalities  - start gabapentin and tylenol and robaxin, toradol PRN and norco PRN  - PTOT  - outpatient referral to spine clinic        MARY JO (obstructive sleep apnea)  CPAP qhs      Anxiety  Continue sertraline      Dyslipidemia  Continue ASA and atorvastatin 10mg        VTE Risk Mitigation (From admission, onward)         Ordered     IP VTE LOW RISK PATIENT  Once         09/06/23 2803                     Marie Bradford PA-C  Department  of [facility] Medicine  [facility] - Emergency Dept

## 2023-09-07 NOTE — PLAN OF CARE
Sw met with patient via bedside. Patient's PCP is correct and preferred pharmacy is bedside. Patient denies any DME or HH. Patient stated he would be interested in a RW. Patient's wife will provide transportation upon discharge. CM will follow.     [facility] ([address])  Initial Discharge Assessment       Primary Care Provider: Dc Fitzgerald MD    Admission Diagnosis: Acute low back pain [M54.50]  Chest pain [R07.9]    Admission Date: 9/6/2023  Expected Discharge Date:     Transition of Care Barriers: None    Payor: [employer] / Plan: [facility] CHOICE PLUS / Product Type: Commercial /     Extended Emergency Contact Information  Primary Emergency Contact: Purvi Smith   [place]  Mobile Phone: [phone]  Relation: Spouse    Discharge Plan A: Home         [facility]  [address]  [facility] [address]  Phone: [phone] Fax: [phone]    [id number] [place], [place] - [address] [place] [address] AT [facility] & [address] [address]  Phone: [phone] Fax: [phone]      Initial Assessment (most recent)       Adult Discharge Assessment - 09/07/23 1044          Discharge Assessment    Assessment Type Discharge Planning Assessment     Confirmed/corrected address, phone number and insurance Yes     Confirmed Demographics Correct on Facesheet     Source of Information patient     People in Home spouse     Do you expect to return to your current living situation? Yes     Do you have help at home or someone to help you manage your care at home? Yes     Who are your caregiver(s) and their phone number(s)? Purvi Smith (Spouse)   [phone]     Prior to hospitilization cognitive status: Alert/Oriented;No Deficits     Current cognitive status: Alert/Oriented;No Deficits     Equipment Currently Used at Home none     Readmission within 30 days? No     Patient currently being followed by outpatient  case management? No     Do you currently have service(s) that help you manage your care at home? No     Do you take prescription medications? Yes     Do you have prescription coverage? Yes     Do you have any problems affording any of your prescribed medications? No     Is the patient taking medications as prescribed? yes     How do you get to doctors appointments? car, drives self;family or friend will provide     Are you on dialysis? No     Discharge Plan discussed with: Patient     Transition of Care Barriers None     Discharge Plan A Home        Physical Activity    On average, how many days per week do you engage in moderate to strenuous exercise (like a brisk walk)? 3 days     On average, how many minutes do you engage in exercise at this level? 60 min        Financial Resource Strain    How hard is it for you to pay for the very basics like food, housing, medical care, and heating? Not hard at all        Housing Stability    In the last 12 months, was there a time when you were not able to pay the mortgage or rent on time? No     In the last 12 months, how many places have you lived? 1     In the last 12 months, was there a time when you did not have a steady place to sleep or slept in a shelter (including now)? No        Transportation Needs    In the past 12 months, has lack of transportation kept you from medical appointments or from getting medications? No     In the past 12 months, has lack of transportation kept you from meetings, work, or from getting things needed for daily living? No        Food Insecurity    Within the past 12 months, you worried that your food would run out before you got the money to buy more. Never true     Within the past 12 months, the food you bought just didn't last and you didn't have money to get more. Never true        Stress    Do you feel stress - tense, restless, nervous, or anxious, or unable to sleep at night because your mind is troubled all the time - these days?  Only a little        Social Connections    In a typical week, how many times do you talk on the phone with family, friends, or neighbors? More than three times a week     How often do you get together with friends or relatives? Three times a week     How often do you attend Shinto or Jehovah's witness services? Never     Do you belong to any clubs or organizations such as Shinto groups, unions, fraternal or athletic groups, or school groups? No     How often do you attend meetings of the clubs or organizations you belong to? Never     Are you , , , , never , or living with a partner?         Alcohol Use    Q1: How often do you have a drink containing alcohol? Monthly or less     Q2: How many drinks containing alcohol do you have on a typical day when you are drinking? 1 or 2     Q3: How often do you have six or more drinks on one occasion? Never

## 2023-09-07 NOTE — HPI
Calvin Smith is a 58M with HLD and chronic low back pain who presents for acute low back pain that started this morning when he bent over to dry his feet when he got out the shower. No fall or trauma, pain does not radiate down his legs. Described as achy and throbby that wraps around his entire low back. Pain has been so severe he cannot walk. He has a history of herniated disc but has not had trouble with it for years. Denies bowel and bladder incontinence, saddle anesthesia       In ER: vitals stable, AST/ALT slightly elevated; CT lumbar spine unremarkable; er spoke with ortho who recommended dexamethasone and fu outpt

## 2023-09-07 NOTE — PLAN OF CARE
Problem: Physical Therapy  Goal: Physical Therapy Goal  Description: Goals to be met by: 23    Patient will increase functional independence with mobility by performin. Sit<>stand with supervision with LRAD.  2. Gait x 100 feet with supervision with LRAD.  3. Ascend/descend 3 step(s) with least restrictive assistive device and CGA.  4. TUG score of <13 sec to indicate not at increased risk for falls.   Outcome: Ongoing, Progressing     Patient evaluated by PT and goals established. Patient reports improvement in baseline pain, however demo's significant guarding of trunk with all movement d/t fear of setting off muscle spasm pain (occurs with trunk rotation and flexion>extension). Gait performed with RW without overt LOB, difficulty with turn navigation d/t trunk movement. PT will continue to follow and progress as tolerated. Rec for dc to home with OP PT, requires RW for mobility. Please see progress note for detailed plan of care and recommendations.

## 2023-09-08 ENCOUNTER — TELEPHONE (OUTPATIENT)
Dept: ORTHOPEDICS | Facility: CLINIC | Age: 58
End: 2023-09-08
Payer: COMMERCIAL

## 2023-09-12 ENCOUNTER — HOSPITAL ENCOUNTER (OUTPATIENT)
Dept: RADIOLOGY | Facility: OTHER | Age: 58
Discharge: HOME | End: 2023-09-12
Attending: STUDENT IN AN ORGANIZED HEALTH CARE EDUCATION/TRAINING PROGRAM
Payer: COMMERCIAL

## 2023-09-12 ENCOUNTER — OFFICE VISIT (OUTPATIENT)
Dept: SPINE | Facility: CLINIC | Age: 58
End: 2023-09-12
Payer: COMMERCIAL

## 2023-09-12 VITALS
WEIGHT: 175 LBS | DIASTOLIC BLOOD PRESSURE: 73 MMHG | HEART RATE: 78 BPM | SYSTOLIC BLOOD PRESSURE: 115 MMHG | HEIGHT: 70 IN | RESPIRATION RATE: 12 BRPM | BODY MASS INDEX: 25.05 KG/M2

## 2023-09-12 DIAGNOSIS — M48.061 NEUROFORAMINAL STENOSIS OF LUMBAR SPINE: ICD-10-CM

## 2023-09-12 DIAGNOSIS — M47.816 LUMBAR SPONDYLOSIS: ICD-10-CM

## 2023-09-12 DIAGNOSIS — M79.18 MYOFASCIAL PAIN SYNDROME: ICD-10-CM

## 2023-09-12 DIAGNOSIS — M51.36 DDD (DEGENERATIVE DISC DISEASE), LUMBAR: Primary | ICD-10-CM

## 2023-09-12 DIAGNOSIS — M43.16 SPONDYLOLISTHESIS OF LUMBAR REGION: ICD-10-CM

## 2023-09-12 PROCEDURE — 99204 PR OFFICE/OUTPT VISIT, NEW, LEVL IV, 45-59 MIN: ICD-10-PCS | Mod: S$GLB,,, | Performed by: STUDENT IN AN ORGANIZED HEALTH CARE EDUCATION/TRAINING PROGRAM

## 2023-09-12 PROCEDURE — 3074F SYST BP LT 130 MM HG: CPT | Mod: CPTII,S$GLB,, | Performed by: STUDENT IN AN ORGANIZED HEALTH CARE EDUCATION/TRAINING PROGRAM

## 2023-09-12 PROCEDURE — 1159F PR MEDICATION LIST DOCUMENTED IN MEDICAL RECORD: ICD-10-PCS | Mod: CPTII,S$GLB,, | Performed by: STUDENT IN AN ORGANIZED HEALTH CARE EDUCATION/TRAINING PROGRAM

## 2023-09-12 PROCEDURE — 3008F BODY MASS INDEX DOCD: CPT | Mod: CPTII,S$GLB,, | Performed by: STUDENT IN AN ORGANIZED HEALTH CARE EDUCATION/TRAINING PROGRAM

## 2023-09-12 PROCEDURE — 72114 X-RAY EXAM L-S SPINE BENDING: CPT | Mod: 26,,, | Performed by: RADIOLOGY

## 2023-09-12 PROCEDURE — 99204 OFFICE O/P NEW MOD 45 MIN: CPT | Mod: S$GLB,,, | Performed by: STUDENT IN AN ORGANIZED HEALTH CARE EDUCATION/TRAINING PROGRAM

## 2023-09-12 PROCEDURE — 1159F MED LIST DOCD IN RCRD: CPT | Mod: CPTII,S$GLB,, | Performed by: STUDENT IN AN ORGANIZED HEALTH CARE EDUCATION/TRAINING PROGRAM

## 2023-09-12 PROCEDURE — 72114 XR LUMBAR SPINE 5 VIEW WITH FLEX AND EXT: ICD-10-PCS | Mod: 26,,, | Performed by: RADIOLOGY

## 2023-09-12 PROCEDURE — 72114 X-RAY EXAM L-S SPINE BENDING: CPT | Mod: TC,FY

## 2023-09-12 PROCEDURE — 99999 PR PBB SHADOW E&M-EST. PATIENT-LVL V: ICD-10-PCS | Mod: PBBFAC,,, | Performed by: STUDENT IN AN ORGANIZED HEALTH CARE EDUCATION/TRAINING PROGRAM

## 2023-09-12 PROCEDURE — 99999 PR PBB SHADOW E&M-EST. PATIENT-LVL V: CPT | Mod: PBBFAC,,, | Performed by: STUDENT IN AN ORGANIZED HEALTH CARE EDUCATION/TRAINING PROGRAM

## 2023-09-12 PROCEDURE — 3078F PR MOST RECENT DIASTOLIC BLOOD PRESSURE < 80 MM HG: ICD-10-PCS | Mod: CPTII,S$GLB,, | Performed by: STUDENT IN AN ORGANIZED HEALTH CARE EDUCATION/TRAINING PROGRAM

## 2023-09-12 PROCEDURE — 3044F PR MOST RECENT HEMOGLOBIN A1C LEVEL <7.0%: ICD-10-PCS | Mod: CPTII,S$GLB,, | Performed by: STUDENT IN AN ORGANIZED HEALTH CARE EDUCATION/TRAINING PROGRAM

## 2023-09-12 PROCEDURE — 3044F HG A1C LEVEL LT 7.0%: CPT | Mod: CPTII,S$GLB,, | Performed by: STUDENT IN AN ORGANIZED HEALTH CARE EDUCATION/TRAINING PROGRAM

## 2023-09-12 PROCEDURE — 3078F DIAST BP <80 MM HG: CPT | Mod: CPTII,S$GLB,, | Performed by: STUDENT IN AN ORGANIZED HEALTH CARE EDUCATION/TRAINING PROGRAM

## 2023-09-12 PROCEDURE — 1160F PR REVIEW ALL MEDS BY PRESCRIBER/CLIN PHARMACIST DOCUMENTED: ICD-10-PCS | Mod: CPTII,S$GLB,, | Performed by: STUDENT IN AN ORGANIZED HEALTH CARE EDUCATION/TRAINING PROGRAM

## 2023-09-12 PROCEDURE — 3008F PR BODY MASS INDEX (BMI) DOCUMENTED: ICD-10-PCS | Mod: CPTII,S$GLB,, | Performed by: STUDENT IN AN ORGANIZED HEALTH CARE EDUCATION/TRAINING PROGRAM

## 2023-09-12 PROCEDURE — 3074F PR MOST RECENT SYSTOLIC BLOOD PRESSURE < 130 MM HG: ICD-10-PCS | Mod: CPTII,S$GLB,, | Performed by: STUDENT IN AN ORGANIZED HEALTH CARE EDUCATION/TRAINING PROGRAM

## 2023-09-12 PROCEDURE — 1160F RVW MEDS BY RX/DR IN RCRD: CPT | Mod: CPTII,S$GLB,, | Performed by: STUDENT IN AN ORGANIZED HEALTH CARE EDUCATION/TRAINING PROGRAM

## 2023-09-12 RX ORDER — TIZANIDINE 4 MG/1
4 TABLET ORAL 3 TIMES DAILY PRN
Qty: 90 TABLET | Refills: 2 | Status: SHIPPED | OUTPATIENT
Start: 2023-09-12 | End: 2023-12-11

## 2023-09-12 NOTE — PROGRESS NOTES
Chronic Pain - New Consult    Referring Physician: Cele Baron, AUGUST    Chief Complaint:   Chief Complaint   Patient presents with    Back Pain        SUBJECTIVE:    Calvin Smith presents to the clinic for the evaluation of lower back pain. He states he has had back pain for 20 years, however he has had exacerbations off/on.  He states that the pain is just in the lower back (no radiation down his legs). He presented to the emergency room on 9/7/23 due to the most severe exacerbation while toweling himself off after a bath.The pain is located in the lower back area and radiates to the sides of the back (not down the legs).  The pain is described as aching and is rated as 6/10. The pain is rated with a score of  0/10 on the BEST day and a score of 10/10 on the WORST day.  Symptoms interfere with daily activity. The pain is exacerbated by Extension and Flexing.  The pain is mitigated by medications.  The patient reports 4 hours of uninterrupted sleep per night (not related to pain).    Patient denies urinary incontinence, bowel incontinence, and significant motor weakness.     He states he is playing more golf.  He presented an MRI with on 1st appearance seems similar to MRI from a few days ago. He felt the treatment he received at the emergency department helped considerably with his pain, however he still has some residual pain.  He does feel this is improving with time.    Physical Therapy/Home Exercise: yes, went last in 2018, states that he is not consistent with exercises at home      Pain Disability Index Review:       No data to display                Pain Medications:   - ibuprofen 600mg   - robaxin 500mg     report:  Reviewed and consistent with medication use as prescribed.    Pain Procedures:   Had Prior Epidural with no relief.    Imaging:   MRI LUMBAR SPINE WITHOUT CONTRAST     CLINICAL HISTORY:  Spinal stenosis, lumbar;     TECHNIQUE:  Multiplanar, multisequence MR images were acquired from  the thoracolumbar junction to the sacrum without contrast.     COMPARISON:  CT 09/06/2023, x-ray 09/06/2023     FINDINGS:  Alignment: Mild levo scoliosis.  Minimal grade 1 retrolisthesis L4-5.  Bilateral spondylolysis L5 with grade 1 anterolisthesis L5-S1.     Vertebrae: Benign osseous hemangioma at L4.  Chronic degenerative endplate change at L4-L5 and L5-S1.     Discs: Degenerative disc desiccation from L3-L4 through L5-S1 with mild L4-L5 and severe L5-S1 height loss.     Cord: Conus terminates at L1 and appears unremarkable.  Cauda equina appears unremarkable.     Degenerative findings:     *T12-L1: No spinal canal stenosis or neural foraminal narrowing.  *L1-L2: No spinal canal stenosis or neural foraminal narrowing.  *L2-L3: No spinal canal stenosis or neural foraminal narrowing.  *L3-L4: Mild broad-based posterior disc bulge with right subarticular protrusion that causes mass effect on the right lateral recess and may impinge upon the right descending L4 nerve root.  Bilateral facet arthropathy and bilateral ligamentum flavum hypertrophy.  Mild spinal canal and moderate right lateral recess stenosis.  *L4-L5: Circumferential disc bulge.  Bilateral facet arthropathy and bilateral ligamentum flavum hypertrophy.  Mild-to-moderate bilateral lateral recess stenosis with possible impingement of the right descending L5 nerve root.  Moderate right and mild left neural foraminal narrowing.  *L5-S1: Grade 1 anterolisthesis with uncovering of the intervertebral disc.  Bilateral facet arthropathy.  No spinal canal stenosis.  Moderate to severe bilateral neural foraminal narrowing with suspected impingement of the exiting L5 nerve roots.  Paraspinal muscles & soft tissues: Right simple renal cyst.     Impression:     1. Bilateral L5 spondylolysis with grade 1 anterolisthesis of L5 on S1 contributing to moderate to severe bilateral neural foraminal narrowing with suspected impingement of the exiting L5 nerve roots.  2.  Additional lumbar spondylosis at L3-L4 and L4-L5 as detailed above.     Electronically signed by resident: Phil Mccray  Date:                                            09/07/2023  Time:                                           14:11     Electronically signed by: Myles Orozco MD  Date:                                            09/07/2023  Time:                                           15:43    CT LUMBAR SPINE WITHOUT CONTRAST     CLINICAL HISTORY:  Low back pain, no red flags, no prior management;     TECHNIQUE:  Low-dose axial, sagittal and coronal reformations are obtained through the lumbar spine.  Contrast was not administered.     COMPARISON:  X-ray 09/06/2023     FINDINGS:  No acute fractures of the lumbar spine.     Moderate disc space narrowing at L5-S1.  Grade 1 spondylolisthesis of L5 on S1.  Minimal grade 1 retrolisthesis of L4 on L5.     Bilateral spondylolysis of L5 with associated facet arthropathy at L5-S1 and L4-5.     L1-2: No significant abnormality.     L2-3: No significant abnormality.     L3-4: Mild diffuse disc protrusion.  Mild ligamentum flavum hypertrophy.  Severe central canal narrowing.  Neural foramen are adequately maintained.     L4-5: Mild diffuse posterior disc osteophyte complex with mild disc protrusion.  Bilateral facet arthropathy.  Mild-moderate central canal narrowing.  Severe right foraminal narrowing.     L5-S1: Bilateral spondylolysis with grade 1 spondylolisthesis and slight uncovering of the disc posteriorly with mild protrusion.  Severe bilateral foraminal narrowing.  Central canal is adequately maintained.     The remaining visualized paravertebral structures demonstrate no pathology.     Impression:     No acute abnormality.     Multilevel chronic and degenerative changes.  See above comments.        Electronically signed by: Owen Sinclair  Date:                                            09/06/2023  Time:                                            20:05    XR LUMBAR SPINE AP AND LATERAL     CLINICAL HISTORY:  low back pain;     TECHNIQUE:  AP, lateral and spot images were performed of the lumbar spine.     COMPARISON:  None     FINDINGS:  Five lumbar vertebral bodies.  Vertebral body heights are maintained.     Disc space narrowing and endplate changes L5-S1.     Grade 1 anterolisthesis L5-S1 suspected to be on account of pars defects.     Impression:     Please see above.        Electronically signed by: Jacque Oropeza  Date:                                            09/06/2023  Time:                                           16:35    Past Medical History:   Diagnosis Date    Anxiety     Colon polyp     Fatty liver     TIA (transient ischemic attack)      Past Surgical History:   Procedure Laterality Date    APPENDECTOMY  2005    COLONOSCOPY  11/2019    EPIDURAL STEROID INJECTION INTO LUMBAR SPINE  2018    INJECTION OF ANESTHETIC AGENT AROUND MEDIAL BRANCH NERVES INNERVATING LUMBAR FACET JOINT  2019     Social History     Socioeconomic History    Marital status:      Spouse name: Leigh Reese    Number of children: 2   Tobacco Use    Smoking status: Never    Smokeless tobacco: Never   Substance and Sexual Activity    Drug use: Never    Sexual activity: Yes   Social History Narrative    He is satisfied with weight.    Rates diet as fair to good.    He does not drink at least 1/2 gallon water daily.    He drinks 1-2 coffee/tea/caffeine-containing soft drinks daily.    Total sleep time at night is 6-7 hours (poor quality).    He works 40-50 hours per week.    He does wear seat belts.    Hobbies include tennis.     Social Determinants of Health     Financial Resource Strain: Low Risk  (9/7/2023)    Overall Financial Resource Strain (CARDIA)     Difficulty of Paying Living Expenses: Not hard at all   Food Insecurity: No Food Insecurity (9/7/2023)    Hunger Vital Sign     Worried About Running Out of Food in the Last Year: Never true     Ran Out of Food in  the Last Year: Never true   Transportation Needs: No Transportation Needs (9/7/2023)    PRAPARE - Transportation     Lack of Transportation (Medical): No     Lack of Transportation (Non-Medical): No   Physical Activity: Sufficiently Active (9/7/2023)    Exercise Vital Sign     Days of Exercise per Week: 3 days     Minutes of Exercise per Session: 60 min   Stress: No Stress Concern Present (9/7/2023)    [employer] [place] [employer] - Occupational Stress Questionnaire     Feeling of Stress : Only a little   Social Connections: Moderately Isolated (9/7/2023)    Social Connection and Isolation Panel [NHANES]     Frequency of Communication with Friends and Family: More than three times a week     Frequency of Social Gatherings with Friends and Family: Three times a week     Attends Druze Services: Never     Active Member of Clubs or Organizations: No     Attends Club or Organization Meetings: Never     Marital Status:    Housing Stability: Low Risk  (9/7/2023)    Housing Stability Vital Sign     Unable to Pay for Housing in the Last Year: No     Number of Places Lived in the Last Year: 1     Unstable Housing in the Last Year: No     Family History   Problem Relation Age of Onset    No Known Problems Mother     Leukemia Father     No Known Problems Sister     No Known Problems Brother     No Known Problems Sister     No Known Problems Brother        Review of patient's allergies indicates:  No Known Allergies    Current Outpatient Medications   Medication Sig    aspirin (ECOTRIN) 81 MG EC tablet Take 1 tablet (81 mg total) by mouth once daily.    atorvastatin (LIPITOR) 10 MG tablet Take 1 tablet (10 mg total) by mouth once daily.    fluticasone propionate (FLONASE) 50 mcg/actuation nasal spray SHAKE LIQUID AND USE 1 SPRAY IN EACH NOSTRIL TWICE DAILY    ibuprofen (ADVIL,MOTRIN) 600 MG tablet Take 1 tablet (600 mg total) by mouth every 6 (six) hours as needed for Pain.    LIDOcaine (LIDODERM) 5 %  "Place 2 patches onto the skin once daily. Remove & Discard patch within 12 hours or as directed by MD    metFORMIN (GLUCOPHAGE-XR) 500 MG ER 24hr tablet Take 1 tablet (500 mg total) by mouth daily with breakfast.    methocarbamoL (ROBAXIN) 500 MG Tab Take 1 tablet (500 mg total) by mouth every 6 (six) hours as needed (back spasms).    scopolamine (TRANSDERM-SCOP) 1.3-1.5 mg (1 mg over 3 days) Place 1 patch onto the skin every 72 hours.    sertraline (ZOLOFT) 25 MG tablet Take 1 tablet (25 mg total) by mouth once daily.    walker Misc 1 each by Misc.(Non-Drug; Combo Route) route once. Rolling walker for 1 dose    tiZANidine (ZANAFLEX) 4 MG tablet Take 1 tablet (4 mg total) by mouth 3 (three) times daily as needed (muscular pain).     No current facility-administered medications for this visit.       REVIEW OF SYSTEMS:    GENERAL:  No weight loss, malaise or fevers.  HEENT:  Negative for frequent or significant headaches.  NECK:  Negative for lumps, goiter, pain and significant neck swelling.  RESPIRATORY:  Negative for cough, wheezing or shortness of breath.  CARDIOVASCULAR:  Negative for chest pain, leg swelling or palpitations.  GI:  Negative for abdominal discomfort, blood in stools or black stools or change in bowel habits.  MUSCULOSKELETAL:  See HPI.  SKIN:  Negative for lesions, rash, and itching.  PSYCH:  Negative for sleep disturbance, mood disorder and recent psychosocial stressors.  HEMATOLOGY/LYMPHOLOGY:  Negative for prolonged bleeding, bruising easily or swollen nodes.  NEURO:   No history of headaches, syncope, paralysis, seizures or tremors.  All other reviewed and negative other than HPI.    OBJECTIVE:    /73 (BP Location: Right arm, Patient Position: Sitting, BP Method: Medium (Automatic))   Pulse 78   Resp 12   Ht 5' 10" (1.778 m)   Wt 79.4 kg (175 lb)   BMI 25.11 kg/m²     PHYSICAL EXAMINATION:  General appearance: Well appearing, in no acute distress, alert and appropriately " communicative.  Psych:  Mood and affect appropriate.  Skin: Skin color, texture, turgor normal, no rashes or lesions, in both upper and lower body.  Head/face:  Atraumatic, normocephalic.  Cor: regular rate  Pulm: non-labored breathing  GI: Abdomen non-distended and non-tender.  Back: Straight leg raising in the sitting and supine positions is negative to radicular pain. No pain to palpation over the spine or paraspinal muscles. Range of motion limited to facet loading/extension with pain reproduction.  Extremities: Peripheral joint ROM is full and pain free without obvious instability or laxity in all four extremities. No deformities, edema, or skin discoloration. Good capillary refill.  Musculoskeletal: hip, sacroiliac and knee provocative maneuvers are negative. Bilateral upper and lower extremity strength is normal and symmetric.  No atrophy or tone abnormalities are noted.  Neuro: Bilateral upper and lower extremity coordination and muscle stretch reflexes are physiologic and symmetric.  Negative Clonus. No loss of sensation is noted.  Gait: Normal.      ASSESSMENT: 58 y.o. year old male with lower back pain, consistent with:     1. DDD (degenerative disc disease), lumbar  [facility]    tiZANidine (ZANAFLEX) 4 MG tablet      2. Neuroforaminal stenosis of lumbar spine  [facility]    X-Ray Lumbar Complete Including Flex And Ext      3. Spondylolisthesis of lumbar region  [facility]      4. Lumbar spondylosis  Ambulatory referral/consult to [facility]    tiZANidine (ZANAFLEX) 4 MG tablet      5. Myofascial pain syndrome          IMPRESSION: Mr. Smith is a 58 y.o gentleman who presents with chronic lower back with a recent acute exacerbation.  He has managed this lower back pain in the past with physical therapy, however he confesses that he has not kept up with his home exercise  program.  He states that his most recent exacerbation has been improving, however he still has some residual pain just in his lower back.  History and physical exam are consistent with facetogenic pain, however he has image findings consistent with spondylolisthesis with evidence of pars defect.  He follows with an orthopedic surgeon (Dr. Carrasco), who had not recommended surgery in the past, and recommended conservative management.  He did have an MRI lumbar spine in 2018 which appears similar to the MRI he obtained a few days ago.    PLAN:   - I have stressed the importance of physical activity and a home exercise plan to help with pain and improve health.  - Patient can continue with medications for now since they are providing benefits, using them appropriately, and without side effects.  - xray lumbar spine with flexion/extension to assess for movement in lieu of the spondylolisthesis   - start tizanidine 4mg every 8 hours as needed for muscular pain  - patient to upload MRI imaging from 2018 into the [facility] portal  - we discussed that if he is not a surgical candidate, and his pain does not improve with physical therapy/medication management, we can consider lumbar medial branch blocks/radiofrequency ablations to help with his pain.  - RTC 6-8 weeks  - Counseled patient regarding the importance of activity modification and physical therapy.    The above plan and management options were discussed at length with patient. Patient is in agreement with the above and verbalized understanding. It will be communicated with the referring physician via electronic record, fax, or mail.    Any Thomas  09/12/2023

## 2023-09-13 ENCOUNTER — PATIENT MESSAGE (OUTPATIENT)
Dept: SPINE | Facility: CLINIC | Age: 58
End: 2023-09-13
Payer: COMMERCIAL

## 2023-09-13 NOTE — TELEPHONE ENCOUNTER
Staff spoke with the patient regarding some questions he had for MD Jareth as directed. After speaking with the patient he decided that he may schedule an appointment to speak with  directly to discuss his concerns.

## 2023-09-14 ENCOUNTER — PATIENT MESSAGE (OUTPATIENT)
Dept: SPINE | Facility: CLINIC | Age: 58
End: 2023-09-14
Payer: COMMERCIAL

## 2023-09-14 DIAGNOSIS — M43.16 SPONDYLOLISTHESIS OF LUMBAR REGION: ICD-10-CM

## 2023-09-14 DIAGNOSIS — M47.816 LUMBAR SPONDYLOSIS: Primary | ICD-10-CM

## 2023-09-14 NOTE — TELEPHONE ENCOUNTER
Staff spoke with regarding healthy back referral, patient stated his referral for healthy back has went away and he needs  to put in another referral so that he can be seen.

## 2023-09-15 NOTE — TELEPHONE ENCOUNTER
Called Mr. Smith regarding his concerns.  He states that he has had no injuries to his hip or recent traumatic events to suggest a fracture of his hip. He denies any pain to his hip, but only back pain.    I again re-iterated the plan to participate in physical therapy and home exercises.  He has started home exercises and he does feel it is helping.  He is scheduled to start physical therapy next week.    He will follow up with me in 30 days in clinic.  All questions answered to his satisfaction.

## 2023-09-19 ENCOUNTER — OFFICE VISIT (OUTPATIENT)
Dept: SPINE | Facility: CLINIC | Age: 58
End: 2023-09-19
Payer: COMMERCIAL

## 2023-09-19 ENCOUNTER — HOSPITAL ENCOUNTER (OUTPATIENT)
Dept: RADIOLOGY | Facility: OTHER | Age: 58
Discharge: HOME | End: 2023-09-19
Attending: STUDENT IN AN ORGANIZED HEALTH CARE EDUCATION/TRAINING PROGRAM
Payer: COMMERCIAL

## 2023-09-19 VITALS
HEART RATE: 86 BPM | WEIGHT: 175 LBS | BODY MASS INDEX: 25.05 KG/M2 | SYSTOLIC BLOOD PRESSURE: 121 MMHG | RESPIRATION RATE: 12 BRPM | DIASTOLIC BLOOD PRESSURE: 69 MMHG | HEIGHT: 70 IN

## 2023-09-19 DIAGNOSIS — M43.17 SPONDYLOLISTHESIS OF LUMBOSACRAL REGION: ICD-10-CM

## 2023-09-19 DIAGNOSIS — M70.60 GREATER TROCHANTERIC BURSITIS, UNSPECIFIED LATERALITY: ICD-10-CM

## 2023-09-19 DIAGNOSIS — M51.36 DDD (DEGENERATIVE DISC DISEASE), LUMBAR: ICD-10-CM

## 2023-09-19 DIAGNOSIS — M47.819 OSTEOARTHRITIS OF SPINE WITHOUT MYELOPATHY OR RADICULOPATHY, UNSPECIFIED SPINAL REGION: ICD-10-CM

## 2023-09-19 DIAGNOSIS — M70.60 GREATER TROCHANTERIC BURSITIS, UNSPECIFIED LATERALITY: Primary | ICD-10-CM

## 2023-09-19 PROCEDURE — 3074F SYST BP LT 130 MM HG: CPT | Mod: CPTII,S$GLB,, | Performed by: STUDENT IN AN ORGANIZED HEALTH CARE EDUCATION/TRAINING PROGRAM

## 2023-09-19 PROCEDURE — 99214 OFFICE O/P EST MOD 30 MIN: CPT | Mod: S$GLB,,, | Performed by: STUDENT IN AN ORGANIZED HEALTH CARE EDUCATION/TRAINING PROGRAM

## 2023-09-19 PROCEDURE — 1160F RVW MEDS BY RX/DR IN RCRD: CPT | Mod: CPTII,S$GLB,, | Performed by: STUDENT IN AN ORGANIZED HEALTH CARE EDUCATION/TRAINING PROGRAM

## 2023-09-19 PROCEDURE — 3044F HG A1C LEVEL LT 7.0%: CPT | Mod: CPTII,S$GLB,, | Performed by: STUDENT IN AN ORGANIZED HEALTH CARE EDUCATION/TRAINING PROGRAM

## 2023-09-19 PROCEDURE — 73521 XR HIPS BILATERAL 2 VIEW INCL AP PELVIS: ICD-10-PCS | Mod: 26,,, | Performed by: RADIOLOGY

## 2023-09-19 PROCEDURE — 1159F MED LIST DOCD IN RCRD: CPT | Mod: CPTII,S$GLB,, | Performed by: STUDENT IN AN ORGANIZED HEALTH CARE EDUCATION/TRAINING PROGRAM

## 2023-09-19 PROCEDURE — 73521 X-RAY EXAM HIPS BI 2 VIEWS: CPT | Mod: 26,,, | Performed by: RADIOLOGY

## 2023-09-19 PROCEDURE — 3044F PR MOST RECENT HEMOGLOBIN A1C LEVEL <7.0%: ICD-10-PCS | Mod: CPTII,S$GLB,, | Performed by: STUDENT IN AN ORGANIZED HEALTH CARE EDUCATION/TRAINING PROGRAM

## 2023-09-19 PROCEDURE — 99214 PR OFFICE/OUTPT VISIT, EST, LEVL IV, 30-39 MIN: ICD-10-PCS | Mod: S$GLB,,, | Performed by: STUDENT IN AN ORGANIZED HEALTH CARE EDUCATION/TRAINING PROGRAM

## 2023-09-19 PROCEDURE — 3008F PR BODY MASS INDEX (BMI) DOCUMENTED: ICD-10-PCS | Mod: CPTII,S$GLB,, | Performed by: STUDENT IN AN ORGANIZED HEALTH CARE EDUCATION/TRAINING PROGRAM

## 2023-09-19 PROCEDURE — 1159F PR MEDICATION LIST DOCUMENTED IN MEDICAL RECORD: ICD-10-PCS | Mod: CPTII,S$GLB,, | Performed by: STUDENT IN AN ORGANIZED HEALTH CARE EDUCATION/TRAINING PROGRAM

## 2023-09-19 PROCEDURE — 3074F PR MOST RECENT SYSTOLIC BLOOD PRESSURE < 130 MM HG: ICD-10-PCS | Mod: CPTII,S$GLB,, | Performed by: STUDENT IN AN ORGANIZED HEALTH CARE EDUCATION/TRAINING PROGRAM

## 2023-09-19 PROCEDURE — 3078F PR MOST RECENT DIASTOLIC BLOOD PRESSURE < 80 MM HG: ICD-10-PCS | Mod: CPTII,S$GLB,, | Performed by: STUDENT IN AN ORGANIZED HEALTH CARE EDUCATION/TRAINING PROGRAM

## 2023-09-19 PROCEDURE — 3008F BODY MASS INDEX DOCD: CPT | Mod: CPTII,S$GLB,, | Performed by: STUDENT IN AN ORGANIZED HEALTH CARE EDUCATION/TRAINING PROGRAM

## 2023-09-19 PROCEDURE — 1160F PR REVIEW ALL MEDS BY PRESCRIBER/CLIN PHARMACIST DOCUMENTED: ICD-10-PCS | Mod: CPTII,S$GLB,, | Performed by: STUDENT IN AN ORGANIZED HEALTH CARE EDUCATION/TRAINING PROGRAM

## 2023-09-19 PROCEDURE — 73521 X-RAY EXAM HIPS BI 2 VIEWS: CPT | Mod: TC,FY

## 2023-09-19 PROCEDURE — 3078F DIAST BP <80 MM HG: CPT | Mod: CPTII,S$GLB,, | Performed by: STUDENT IN AN ORGANIZED HEALTH CARE EDUCATION/TRAINING PROGRAM

## 2023-09-19 PROCEDURE — 99999 PR PBB SHADOW E&M-EST. PATIENT-LVL IV: CPT | Mod: PBBFAC,,, | Performed by: STUDENT IN AN ORGANIZED HEALTH CARE EDUCATION/TRAINING PROGRAM

## 2023-09-19 PROCEDURE — 99999 PR PBB SHADOW E&M-EST. PATIENT-LVL IV: ICD-10-PCS | Mod: PBBFAC,,, | Performed by: STUDENT IN AN ORGANIZED HEALTH CARE EDUCATION/TRAINING PROGRAM

## 2023-09-19 NOTE — PROGRESS NOTES
Chronic patient Established Note (Follow up visit)      SUBJECTIVE:    Calvin Smith presents to the clinic for a follow-up appointment for lower back pain. Since the last visit, Calvin Smith states the pain has been improving. Current pain intensity is 4/10.    Pain Disability Index Review:       No data to display              INTERVAL HISTORY 9/19/23:  He states that he has been trying to get with physical therapy, however he has been having issues with scheduling.  He has started some exercises at home, and he feels his pain has been improving.  He reports that he has been able to take less of the tizanidine and ibuprofen. He is anxious about his hip, as he was told by a surgeon that he may have a hip fracture.  He does admit to some soreness around the left hip.  He also admits that he does sometimes wake up when he turns to the left or right side.    INITIAL HPI 9/12/23:  Calvin Smith presents to the clinic for the evaluation of lower back pain. He states he has had back pain for 20 years, however he has had exacerbations off/on.  He states that the pain is just in the lower back (no radiation down his legs). He presented to the emergency room on 9/7/23 due to the most severe exacerbation while toweling himself off after a bath.The pain is located in the lower back area and radiates to the sides of the back (not down the legs).  The pain is described as aching and is rated as 6/10. The pain is rated with a score of  0/10 on the BEST day and a score of 10/10 on the WORST day.  Symptoms interfere with daily activity. The pain is exacerbated by Extension and Flexing.  The pain is mitigated by medications.  The patient reports 4 hours of uninterrupted sleep per night (not related to pain).     Patient denies urinary incontinence, bowel incontinence, and significant motor weakness.      He states he is playing more golf.  He presented an MRI with on 1st appearance seems similar to MRI from a few days ago. He  felt the treatment he received at the emergency department helped considerably with his pain, however he still has some residual pain.  He does feel this is improving with time.     Physical Therapy/Home Exercise: yes, went last in 2018, states that he is not consistent with exercises at home       Pain Medications:   - ibuprofen 600mg   - robaxin 500mg      report:  Reviewed and consistent with medication use as prescribed.     Pain Procedures:   Had Prior Epidural with no relief.     Imaging:   MRI LUMBAR SPINE WITHOUT CONTRAST     CLINICAL HISTORY:  Spinal stenosis, lumbar;     TECHNIQUE:  Multiplanar, multisequence MR images were acquired from the thoracolumbar junction to the sacrum without contrast.     COMPARISON:  CT 09/06/2023, x-ray 09/06/2023     FINDINGS:  Alignment: Mild levo scoliosis.  Minimal grade 1 retrolisthesis L4-5.  Bilateral spondylolysis L5 with grade 1 anterolisthesis L5-S1.     Vertebrae: Benign osseous hemangioma at L4.  Chronic degenerative endplate change at L4-L5 and L5-S1.     Discs: Degenerative disc desiccation from L3-L4 through L5-S1 with mild L4-L5 and severe L5-S1 height loss.     Cord: Conus terminates at L1 and appears unremarkable.  Cauda equina appears unremarkable.     Degenerative findings:     *T12-L1: No spinal canal stenosis or neural foraminal narrowing.  *L1-L2: No spinal canal stenosis or neural foraminal narrowing.  *L2-L3: No spinal canal stenosis or neural foraminal narrowing.  *L3-L4: Mild broad-based posterior disc bulge with right subarticular protrusion that causes mass effect on the right lateral recess and may impinge upon the right descending L4 nerve root.  Bilateral facet arthropathy and bilateral ligamentum flavum hypertrophy.  Mild spinal canal and moderate right lateral recess stenosis.  *L4-L5: Circumferential disc bulge.  Bilateral facet arthropathy and bilateral ligamentum flavum hypertrophy.  Mild-to-moderate bilateral lateral recess stenosis  with possible impingement of the right descending L5 nerve root.  Moderate right and mild left neural foraminal narrowing.  *L5-S1: Grade 1 anterolisthesis with uncovering of the intervertebral disc.  Bilateral facet arthropathy.  No spinal canal stenosis.  Moderate to severe bilateral neural foraminal narrowing with suspected impingement of the exiting L5 nerve roots.  Paraspinal muscles & soft tissues: Right simple renal cyst.     Impression:     1. Bilateral L5 spondylolysis with grade 1 anterolisthesis of L5 on S1 contributing to moderate to severe bilateral neural foraminal narrowing with suspected impingement of the exiting L5 nerve roots.  2. Additional lumbar spondylosis at L3-L4 and L4-L5 as detailed above.     Electronically signed by resident: Phil Mccray  Date:                                            09/07/2023  Time:                                           14:11     Electronically signed by: Myles Orozco MD  Date:                                            09/07/2023  Time:                                           15:43     CT LUMBAR SPINE WITHOUT CONTRAST     CLINICAL HISTORY:  Low back pain, no red flags, no prior management;     TECHNIQUE:  Low-dose axial, sagittal and coronal reformations are obtained through the lumbar spine.  Contrast was not administered.     COMPARISON:  X-ray 09/06/2023     FINDINGS:  No acute fractures of the lumbar spine.     Moderate disc space narrowing at L5-S1.  Grade 1 spondylolisthesis of L5 on S1.  Minimal grade 1 retrolisthesis of L4 on L5.     Bilateral spondylolysis of L5 with associated facet arthropathy at L5-S1 and L4-5.     L1-2: No significant abnormality.     L2-3: No significant abnormality.     L3-4: Mild diffuse disc protrusion.  Mild ligamentum flavum hypertrophy.  Severe central canal narrowing.  Neural foramen are adequately maintained.     L4-5: Mild diffuse posterior disc osteophyte complex with mild disc protrusion.  Bilateral facet  arthropathy.  Mild-moderate central canal narrowing.  Severe right foraminal narrowing.     L5-S1: Bilateral spondylolysis with grade 1 spondylolisthesis and slight uncovering of the disc posteriorly with mild protrusion.  Severe bilateral foraminal narrowing.  Central canal is adequately maintained.     The remaining visualized paravertebral structures demonstrate no pathology.     Impression:     No acute abnormality.     Multilevel chronic and degenerative changes.  See above comments.        Electronically signed by: Owen Sinclair  Date:                                            09/06/2023  Time:                                           20:05     XR LUMBAR SPINE AP AND LATERAL     CLINICAL HISTORY:  low back pain;     TECHNIQUE:  AP, lateral and spot images were performed of the lumbar spine.     COMPARISON:  None     FINDINGS:  Five lumbar vertebral bodies.  Vertebral body heights are maintained.     Disc space narrowing and endplate changes L5-S1.     Grade 1 anterolisthesis L5-S1 suspected to be on account of pars defects.     Impression:     Please see above.        Electronically signed by: Jacque Oropeza  Date:                                            09/06/2023  Time:                                           16:35    Allergies: Review of patient's allergies indicates:  No Known Allergies    Current Medications:   Current Outpatient Medications   Medication Sig Dispense Refill    aspirin (ECOTRIN) 81 MG EC tablet Take 1 tablet (81 mg total) by mouth once daily. 90 tablet 3    atorvastatin (LIPITOR) 10 MG tablet Take 1 tablet (10 mg total) by mouth once daily. 90 tablet 3    fluticasone propionate (FLONASE) 50 mcg/actuation nasal spray SHAKE LIQUID AND USE 1 SPRAY IN EACH NOSTRIL TWICE DAILY 48 g 3    ibuprofen (ADVIL,MOTRIN) 600 MG tablet Take 1 tablet (600 mg total) by mouth every 6 (six) hours as needed for Pain. 40 tablet 0    LIDOcaine (LIDODERM) 5 % Place 2 patches onto the skin once daily.  Remove & Discard patch within 12 hours or as directed by MD 15 patch 0    metFORMIN (GLUCOPHAGE-XR) 500 MG ER 24hr tablet Take 1 tablet (500 mg total) by mouth daily with breakfast. 90 tablet 3    scopolamine (TRANSDERM-SCOP) 1.3-1.5 mg (1 mg over 3 days) Place 1 patch onto the skin every 72 hours. 10 patch 0    sertraline (ZOLOFT) 25 MG tablet Take 1 tablet (25 mg total) by mouth once daily. 90 tablet 3    tiZANidine (ZANAFLEX) 4 MG tablet Take 1 tablet (4 mg total) by mouth 3 (three) times daily as needed (muscular pain). 90 tablet 2    walker Misc 1 each by Misc.(Non-Drug; Combo Route) route once. Rolling walker for 1 dose 1 each 0     No current facility-administered medications for this visit.       REVIEW OF SYSTEMS:    GENERAL:  No weight loss, malaise or fevers.  HEENT:  Negative for frequent or significant headaches.  NECK:  Negative for lumps, goiter, pain and significant neck swelling.  RESPIRATORY:  Negative for cough, wheezing or shortness of breath.  CARDIOVASCULAR:  Negative for chest pain, leg swelling or palpitations.  GI:  Negative for abdominal discomfort, blood in stools or black stools or change in bowel habits.  MUSCULOSKELETAL:  See HPI.  SKIN:  Negative for lesions, rash, and itching.  PSYCH:  Negative for sleep disturbance, mood disorder and recent psychosocial stressors.  HEMATOLOGY/LYMPHOLOGY:  Negative for prolonged bleeding, bruising easily or swollen nodes.  NEURO:   No history of headaches, syncope, paralysis, seizures or tremors.  All other reviewed and negative other than HPI.    Past Medical History:  Past Medical History:   Diagnosis Date    Anxiety     Colon polyp     Fatty liver     TIA (transient ischemic attack)        Past Surgical History:  Past Surgical History:   Procedure Laterality Date    APPENDECTOMY  2005    COLONOSCOPY  11/2019    EPIDURAL STEROID INJECTION INTO LUMBAR SPINE  2018    INJECTION OF ANESTHETIC AGENT AROUND MEDIAL BRANCH NERVES INNERVATING LUMBAR FACET  JOINT  2019       Family History:  Family History   Problem Relation Age of Onset    No Known Problems Mother     Leukemia Father     No Known Problems Sister     No Known Problems Brother     No Known Problems Sister     No Known Problems Brother        Social History:  Social History     Socioeconomic History    Marital status:      Spouse name: Leigh Reese    Number of children: 2   Tobacco Use    Smoking status: Never    Smokeless tobacco: Never   Substance and Sexual Activity    Drug use: Never    Sexual activity: Yes   Social History Narrative    He is satisfied with weight.    Rates diet as fair to good.    He does not drink at least 1/2 gallon water daily.    He drinks 1-2 coffee/tea/caffeine-containing soft drinks daily.    Total sleep time at night is 6-7 hours (poor quality).    He works 40-50 hours per week.    He does wear seat belts.    Hobbies include tennis.     Social Determinants of Health     Financial Resource Strain: Low Risk  (9/7/2023)    Overall Financial Resource Strain (CARDIA)     Difficulty of Paying Living Expenses: Not hard at all   Food Insecurity: No Food Insecurity (9/7/2023)    Hunger Vital Sign     Worried About Running Out of Food in the Last Year: Never true     Ran Out of Food in the Last Year: Never true   Transportation Needs: No Transportation Needs (9/7/2023)    PRAPARE - Transportation     Lack of Transportation (Medical): No     Lack of Transportation (Non-Medical): No   Physical Activity: Sufficiently Active (9/7/2023)    Exercise Vital Sign     Days of Exercise per Week: 3 days     Minutes of Exercise per Session: 60 min   Stress: No Stress Concern Present (9/7/2023)    Citizen of [place] of Occupational Health - Occupational Stress Questionnaire     Feeling of Stress : Only a little   Social Connections: Moderately Isolated (9/7/2023)    Social Connection and Isolation Panel [NHANES]     Frequency of Communication with Friends and Family: More than three times a  "week     Frequency of Social Gatherings with Friends and Family: Three times a week     Attends Bahai Services: Never     Active Member of Clubs or Organizations: No     Attends Club or Organization Meetings: Never     Marital Status:    Housing Stability: Low Risk  (9/7/2023)    Housing Stability Vital Sign     Unable to Pay for Housing in the Last Year: No     Number of Places Lived in the Last Year: 1     Unstable Housing in the Last Year: No       OBJECTIVE:    /69 (BP Location: Right arm, Patient Position: Sitting, BP Method: Medium (Automatic))   Pulse 86   Resp 12   Ht 5' 10" (1.778 m)   Wt 79.4 kg (175 lb)   BMI 25.11 kg/m²     PHYSICAL EXAMINATION:  General appearance: Well appearing, in no acute distress, alert and appropriately communicative.  Psych:  Mood and affect appropriate.  Skin: Skin color, texture, turgor normal, no rashes or lesions, in both upper and lower body.  Head/face:  Atraumatic, normocephalic.  Cor: regular rate  Pulm: non-labored breathing  GI: Abdomen non-distended and non-tender.  Back: Straight leg raising in the sitting and supine positions is negative to radicular pain. No pain to palpation over the spine or paraspinal muscles. Range of motion limited to facet loading/extension with pain reproduction.  Extremities: Peripheral joint ROM is full and pain free without obvious instability or laxity in all four extremities. +HORACIO on left; GTB tenderness left > right.  No deformities, edema, or skin discoloration. Good capillary refill.  Musculoskeletal: hip, sacroiliac and knee provocative maneuvers are negative. Bilateral upper and lower extremity strength is normal and symmetric.  No atrophy or tone abnormalities are noted.  Neuro: Bilateral upper and lower extremity coordination and muscle stretch reflexes are physiologic and symmetric.  Negative Clonus. No loss of sensation is noted.  Gait: Normal.    ASSESSMENT: 58 y.o. year old male with lower back/hip " pain, consistent with:     1. Greater trochanteric bursitis, unspecified laterality  X-Ray Hips Bilateral 2 View Incl AP Pelvis      2. DDD (degenerative disc disease), lumbar        3. 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 Spondylolisthesis of lumbosacral region          IMPRESSION: Mr. Smith is a 58 y.o gentleman who presents with chronic lower back with a recent acute exacerbation.  History and physical exam are consistent with facetogenic pain, however he has image findings consistent with spondylolisthesis with evidence of pars defect.  He follows with an orthopedic surgeon (Dr. Carrasco), who had not recommended surgery in the past, and recommended conservative management.  He did have an MRI lumbar spine in 2018 which appears similar to the MRI he obtained a few days ago.  Lumbar xrays (with flexion/extension) were completed since his last visit and demonstrated no significant instability.  He has discussed his findings with 2 spine surgeons who said he is not a surgical candidate, however one of the surgeons mentioned he may have hip pathology.  He has started home exercises, but has not started physical therapy (starting this week).  He feels his pain is improving with home exercises.  He has been weaning himself off ibuprofen and tizanidine.    PLAN:   - I have stressed the importance of physical activity and a home exercise plan to help with pain and improve health.  - Patient can continue with medications for now since they are providing benefits, using them appropriately, and without side effects.  - xray bilateral hip and pelvis  - continue with physical therapy as scheduled; plan for healthy back program in November  - we discussed doing GTB injections which could help with his bursitis; for now he'd like to wait  - we can discuss medial branch blocks/radiofrequency ablation at his next visit if his pain has not improved.  - RTC 6-8 weeks after  completing several weeks of physical therapy  - Counseled patient regarding the importance of activity modification and physical therapy.    The above plan and management options were discussed at length with patient. Patient is in agreement with the above and verbalized understanding.    Any Thomas  09/19/2023

## 2023-09-21 ENCOUNTER — CLINICAL SUPPORT (OUTPATIENT)
Dept: REHABILITATION | Facility: HOSPITAL | Age: 58
End: 2023-09-21
Attending: STUDENT IN AN ORGANIZED HEALTH CARE EDUCATION/TRAINING PROGRAM
Payer: COMMERCIAL

## 2023-09-21 DIAGNOSIS — M47.816 LUMBAR SPONDYLOSIS: ICD-10-CM

## 2023-09-21 DIAGNOSIS — M43.16 SPONDYLOLISTHESIS OF LUMBAR REGION: ICD-10-CM

## 2023-09-21 PROCEDURE — 97161 PT EVAL LOW COMPLEX 20 MIN: CPT

## 2023-09-21 PROCEDURE — 97110 THERAPEUTIC EXERCISES: CPT

## 2023-09-21 NOTE — PLAN OF CARE
[facility]   Physical Therapy Initial Evaluation      Name: Calvin LOPEZ [facility] Number: [id number]    Therapy Diagnosis:   Encounter Diagnoses   Name Primary?    Lumbar spondylosis     Spondylolisthesis of lumbar region         Physician: Any Thomas MD    Physician Orders: PT Eval and Treat   Medical Diagnosis from Referral: M47.816 (ICD-10-CM) - Lumbar spondylosis M43.16 (ICD-10-CM) - Spondylolisthesis of lumbar region   Evaluation Date: 9/21/2023  Authorization Period Expiration: 10/30/2023   Plan of Care Expiration: 11/3/2023  Progress Note Due: 10/19/2023  Date of Surgery: n/a  Visit # / Visits authorized: 1/ 1   FOTO: 1/ 3    Precautions: Standard     Time In: 10:45 AM  Time Out: 11:30 AM  Total Billable Time: 45 minutes    Subjective     Date of onset: 9/7/23    History of current condition - Calvin reports: severe pain/ muscle spasms in low back when getting out of shower and bending over to dry off the morning of 9/7/23.  Pt has history of chronic low back pain, herniated discs. Pt's spasms so severe that morning that patient went to ED due to difficulty with mobility. Pt denies bowel and bladder incontinence, saddle sensation.    Falls: no    Imaging:   MRI LUMBAR SPINE WITHOUT CONTRAST  Impression:     1. Bilateral L5 spondylolysis with grade 1 anterolisthesis of L5 on S1 contributing to moderate to severe bilateral neural foraminal narrowing with suspected impingement of the exiting L5 nerve roots.  2. Additional lumbar spondylosis at L3-L4 and L4-L5 as detailed above.    Prior Therapy: yes  Social History:  lives with their spouse  Occupation: works from home for [employer]  Prior Level of Function: independent  Current Level of Function: independent (unable to do yard work, play golf)    Pain:  Current 0/10, worst 7/10, best 0/10   Location: bilateral back  (around waist)  Description: Aching, Grabbing, and tightness, spasms  Aggravating Factors: Flexing  Easing Factors:   gentle stretching    Patients goals: Return to golf, manageable pain levels     Medical History:   Past Medical History:   Diagnosis Date    Anxiety     Colon polyp     Fatty liver     TIA (transient ischemic attack)        Surgical History:   Calvin Smith  has a past surgical history that includes Colonoscopy (11/2019); Appendectomy (2005); Epidural steroid injection into lumbar spine (2018); and Injection of anesthetic agent around medial branch nerves innervating lumbar facet joint (2019).    Medications:   Calvin has a current medication list which includes the following prescription(s): aspirin, atorvastatin, fluticasone propionate, ibuprofen, lidocaine, metformin, scopolamine, sertraline, tizanidine, and walker.    Allergies:   Review of patient's allergies indicates:  No Known Allergies     Objective      Observation: normal gait pattern    Posture:  anterior pelvic tilt, decreased lumbar lordosis    Lumbar Range of Motion:    Degrees Pain   Flexion Significantly limited (pt fearful of movement)   Tightness        Extension Minimal loss   No pain        Left Side Bending Moderate loss tightness        Right Side Bending Moderate loss  tightness        Left rotation   Moderate loss tightness        Right Rotation   Moderate loss tightness             Lower Extremity Strength  Right LE  Left LE    Knee extension: 5/5 Knee extension: 5/5   Knee flexion: 5/5 Knee flexion: 5/5   Hip flexion: 4+/5 Hip flexion: 4+/5   Hip extension:  4+/5 Hip extension: 4+/5   Hip abduction: 4+/5 Hip abduction: 4+/5   Hip adduction: 4+/5 Hip adduction 4+/5   Ankle dorsiflexion: 4+/5 Ankle dorsiflexion: 4+/5   Ankle plantarflexion: 4+/5  (NWB) Ankle plantarflexion: 4+/5  (NWB)         Special Tests:  -Repeated Flexion: + pain  -Repeated Ext: no change in symptoms  -Piriformis Test: + tightness bilaterally  -Prone Instability Test: NT  -Bridge Test: negative      Neuro Dynamic Testing:    Sciatic nerve:      SLR: R = negative     L =  "negative       Femoral Nerve:    Femoral nerve test: negative  Slump Test: negative bilaterally    Palpation: tender to palpation lumbar paraspinals    Sensation: intact to light touch    Flexibility:  moderate limitations in bilateral hamstrings, quads, hip flexors and gluteal musculature.    Xavier test: R = + ; L = +     Intake Outcome Measure for FOTO Lumbar Spine Survey    Therapist reviewed FOTO scores for Calvin Smith on 9/21/2023.   FOTO report - see Media section or FOTO account episode details.    Intake Score: 49% limitation  Projected Limitation Score: 31% limitation    Modified Oswestry: 32% limitation         Treatment     Total Treatment time (time-based codes) separate from Evaluation: 15 minutes     Calvin received the treatments listed below:      therapeutic exercises to develop flexibility for 15 minutes including:  - lower trunk rotation x 10 bilaterally  - prone on elbows 2 min  - prone quad stretch with strap 20" x 3  - figure 4 piriformis stretch 20" x 3  - half kneel hip flexor stretch 20" x 3 bilaterally        Patient Education and Home Exercises     Education provided:   - reviewed anatomy / physiology related to diagnosis  - role of PT, PT POC and goals  - attendance policy     Written Home Exercises Provided: yes. Exercises were reviewed and Calvin was able to demonstrate them prior to the end of the session.  Calvin demonstrated good  understanding of the education provided. See EMR under Patient Instructions for exercises provided during therapy sessions.    Assessment     Calvin is a 58 y.o. male referred to outpatient Physical Therapy with a medical diagnosis of M47.816 (ICD-10-CM) - Lumbar spondylosis M43.16 (ICD-10-CM) - Spondylolisthesis of lumbar region. Patient presents with low back pain, decreased lumbar range of motion, decreased flexibility of bilateral hip flexors, quads, hamstrings and gluteal musculature, and decreased core strength and stability, limiting patient's ability to " perform daily activities including lifting, driving and sitting to perform work tasks.     Patient prognosis is Good.   Patient will benefit from skilled outpatient Physical Therapy to address the deficits stated above and in the chart below, provide patient /family education, and to maximize patientt's level of independence.     Plan of care discussed with patient: Yes  Patient's spiritual, cultural and educational needs considered and patient is agreeable to the plan of care and goals as stated below:     Anticipated Barriers for therapy: anxiety    Medical Necessity is demonstrated by the following  History  Co-morbidities and personal factors that may impact the plan of care [] LOW: no personal factors / co-morbidities  [x] MODERATE: 1-2 personal factors / co-morbidities  [] HIGH: 3+ personal factors / co-morbidities    Moderate / High Support Documentation:   Co-morbidities affecting plan of care: anxiety    Personal Factors:   coping style     Examination  Body Structures and Functions, activity limitations and participation restrictions that may impact the plan of care [] LOW: addressing 1-2 elements  [] MODERATE: 3+ elements  [] HIGH: 4+ elements (please support below)    Moderate / High Support Documentation: Based on PMHX, co morbidities , data from assessments and functional level of assistance required with task and clinical presentation directly impacting function.         Clinical Presentation [x] LOW: stable  [] MODERATE: Evolving  [] HIGH: Unstable     Decision Making/ Complexity Score: low       Goals:  Short Term Goals: 3 weeks   1. Patient will demonstrate good transverse abdominal muscle contraction for improved deep abdominal strength and lumbar stability.  2. Increase lumbar ROM to 100% of WNL in order to improve functional mobility.     3. Patient will demonstrate good sitting/standing posture and body mechanics for improved spine health and decreased risk of future injury.   4. Patient will  improve bilateral lower extremity MMT grades by >/=1/2 grade in order to improve strength for standing ADLs.   5. Patient will be compliant with home exercise program to supplement therapy in promoting functional mobility.    Long Term Goals: 6 Weeks  1. Patient will improve FOTO score to </= 31% limited to decrease perceived limitation with maintaining/changing body position.   2. Patient will be 100% compliant with updated HEP in order to maximize PT benefits post-discharge.  3. Patient will improve bilateral lower extremity MMT grades by >/=1 grade in order to improve strength for standing activities of daily living.  4. Patient will report pain at worst over 2-week period as </=2/10 in order to improve general activity tolerance.   5. Pt will begin some form of home/community fitness in order to sustain progress gained in PT        Plan     Plan of care Certification: 9/21/2023 to 11/3/2023.    Outpatient Physical Therapy 2 times weekly for 6 weeks to include the following interventions: Aquatic Therapy, Cervical/Lumbar Traction, Electrical Stimulation PRN, Manual Therapy, Moist Heat/ Ice, Neuromuscular Re-ed, Patient Education, Therapeutic Activities, Therapeutic Exercise, and functional dry needling PRN .     Judie Oden, PT        Physician's Signature: _________________________________________ Date: ________________

## 2023-09-24 RX ORDER — IBUPROFEN 600 MG/1
600 TABLET ORAL EVERY 8 HOURS PRN
Qty: 90 TABLET | Refills: 2 | Status: SHIPPED | OUTPATIENT
Start: 2023-09-24 | End: 2023-09-25

## 2023-09-25 RX ORDER — IBUPROFEN 600 MG/1
600 TABLET ORAL EVERY 8 HOURS PRN
Qty: 90 TABLET | Refills: 2 | Status: SHIPPED | OUTPATIENT
Start: 2023-09-25

## 2023-09-27 ENCOUNTER — OFFICE VISIT (OUTPATIENT)
Dept: INTERNAL MEDICINE | Facility: CLINIC | Age: 58
End: 2023-09-27
Payer: COMMERCIAL

## 2023-09-27 VITALS
HEIGHT: 70 IN | OXYGEN SATURATION: 96 % | HEART RATE: 97 BPM | RESPIRATION RATE: 20 BRPM | SYSTOLIC BLOOD PRESSURE: 112 MMHG | BODY MASS INDEX: 24.9 KG/M2 | WEIGHT: 173.94 LBS | DIASTOLIC BLOOD PRESSURE: 78 MMHG | TEMPERATURE: 98 F

## 2023-09-27 DIAGNOSIS — M51.26 HERNIATED LUMBAR INTERVERTEBRAL DISC: ICD-10-CM

## 2023-09-27 DIAGNOSIS — M47.816 LUMBAR FACET ARTHROPATHY: ICD-10-CM

## 2023-09-27 DIAGNOSIS — M48.061 NEUROFORAMINAL STENOSIS OF LUMBAR SPINE: ICD-10-CM

## 2023-09-27 DIAGNOSIS — Z23 NEED FOR PROPHYLACTIC VACCINATION AND INOCULATION AGAINST INFLUENZA: ICD-10-CM

## 2023-09-27 DIAGNOSIS — Z09 HOSPITAL DISCHARGE FOLLOW-UP: Primary | ICD-10-CM

## 2023-09-27 PROCEDURE — 3008F BODY MASS INDEX DOCD: CPT | Mod: CPTII,S$GLB,, | Performed by: FAMILY MEDICINE

## 2023-09-27 PROCEDURE — 90686 FLU VACCINE (QUAD) GREATER THAN OR EQUAL TO 3YO PRESERVATIVE FREE IM: ICD-10-PCS | Mod: S$GLB,,, | Performed by: FAMILY MEDICINE

## 2023-09-27 PROCEDURE — 99214 OFFICE O/P EST MOD 30 MIN: CPT | Mod: S$GLB,,, | Performed by: FAMILY MEDICINE

## 2023-09-27 PROCEDURE — 99214 PR OFFICE/OUTPT VISIT, EST, LEVL IV, 30-39 MIN: ICD-10-PCS | Mod: S$GLB,,, | Performed by: FAMILY MEDICINE

## 2023-09-27 PROCEDURE — G0008 FLU VACCINE (QUAD) GREATER THAN OR EQUAL TO 3YO PRESERVATIVE FREE IM: ICD-10-PCS | Mod: S$GLB,,, | Performed by: FAMILY MEDICINE

## 2023-09-27 PROCEDURE — 3078F PR MOST RECENT DIASTOLIC BLOOD PRESSURE < 80 MM HG: ICD-10-PCS | Mod: CPTII,S$GLB,, | Performed by: FAMILY MEDICINE

## 2023-09-27 PROCEDURE — 1159F MED LIST DOCD IN RCRD: CPT | Mod: CPTII,S$GLB,, | Performed by: FAMILY MEDICINE

## 2023-09-27 PROCEDURE — G0008 ADMIN INFLUENZA VIRUS VAC: HCPCS | Mod: S$GLB,,, | Performed by: FAMILY MEDICINE

## 2023-09-27 PROCEDURE — 3044F PR MOST RECENT HEMOGLOBIN A1C LEVEL <7.0%: ICD-10-PCS | Mod: CPTII,S$GLB,, | Performed by: FAMILY MEDICINE

## 2023-09-27 PROCEDURE — 99999 PR PBB SHADOW E&M-EST. PATIENT-LVL IV: CPT | Mod: PBBFAC,,, | Performed by: FAMILY MEDICINE

## 2023-09-27 PROCEDURE — 3074F PR MOST RECENT SYSTOLIC BLOOD PRESSURE < 130 MM HG: ICD-10-PCS | Mod: CPTII,S$GLB,, | Performed by: FAMILY MEDICINE

## 2023-09-27 PROCEDURE — 1160F PR REVIEW ALL MEDS BY PRESCRIBER/CLIN PHARMACIST DOCUMENTED: ICD-10-PCS | Mod: CPTII,S$GLB,, | Performed by: FAMILY MEDICINE

## 2023-09-27 PROCEDURE — 3044F HG A1C LEVEL LT 7.0%: CPT | Mod: CPTII,S$GLB,, | Performed by: FAMILY MEDICINE

## 2023-09-27 PROCEDURE — 3008F PR BODY MASS INDEX (BMI) DOCUMENTED: ICD-10-PCS | Mod: CPTII,S$GLB,, | Performed by: FAMILY MEDICINE

## 2023-09-27 PROCEDURE — 99999 PR PBB SHADOW E&M-EST. PATIENT-LVL IV: ICD-10-PCS | Mod: PBBFAC,,, | Performed by: FAMILY MEDICINE

## 2023-09-27 PROCEDURE — 1160F RVW MEDS BY RX/DR IN RCRD: CPT | Mod: CPTII,S$GLB,, | Performed by: FAMILY MEDICINE

## 2023-09-27 PROCEDURE — 1159F PR MEDICATION LIST DOCUMENTED IN MEDICAL RECORD: ICD-10-PCS | Mod: CPTII,S$GLB,, | Performed by: FAMILY MEDICINE

## 2023-09-27 PROCEDURE — 90686 IIV4 VACC NO PRSV 0.5 ML IM: CPT | Mod: S$GLB,,, | Performed by: FAMILY MEDICINE

## 2023-09-27 PROCEDURE — 3078F DIAST BP <80 MM HG: CPT | Mod: CPTII,S$GLB,, | Performed by: FAMILY MEDICINE

## 2023-09-27 PROCEDURE — 3074F SYST BP LT 130 MM HG: CPT | Mod: CPTII,S$GLB,, | Performed by: FAMILY MEDICINE

## 2023-09-27 NOTE — PROGRESS NOTES
Transitional Care Note  Subjective:       Patient ID: Calvin Smith is a 58 y.o. male.  Chief Complaint: Follow-up and Low-back Pain    Family and/or Caretaker present at visit?  No.  Diagnostic tests reviewed/disposition: I have reviewed all completed as well as pending diagnostic tests at the time of discharge.  Disease/illness education:  Chronic back pain  Home health/community services discussion/referrals: Patient does not have home health established from hospital visit.  They do not need home health.  If needed, we will set up home health for the patient.   Establishment or re-establishment of referral orders for community resources: No other necessary community resources.   Discussion with other health care providers: No discussion with other health care providers necessary.   58-year-old white male with chronic low back pain presents to clinic today for a hospital follow-up secondary to an admission on September 6, 2023 and discharged on September 7, 2023 secondary to severe acute onset lower back pain.  The patient reports that he was bending down to dry his feet after getting out the shower when he began to note an achy throbbing pain which wrapped his entire lower back making him unable to walk.  The patient presented to the emergency room where workup revealed mildly elevated liver enzymes.  CT of the lumbar spine was unremarkable.  Orthopedics was consulted who recommended treatment with dexamethasone and outpatient follow-up.  Since discharge he has followed up with back and spine.  A repeat MRI was performed.  The patient has been informed to continue his home exercise program and physical therapy.  A referral to the healthy back program was placed.  At this time he remains stable.  He is interested in receiving his flu vaccine.  Follow-up  Pertinent negatives include no abdominal pain, chest pain, chills, congestion, coughing, fatigue, fever, headaches, myalgias, nausea, neck pain, rash, sore  throat or vomiting.   Low-back Pain  Pertinent negatives include no abdominal pain, chest pain, chills, congestion, coughing, fatigue, fever, headaches, myalgias, nausea, neck pain, rash, sore throat or vomiting.     Review of Systems   Constitutional:  Negative for appetite change, chills, fatigue and fever.   HENT:  Negative for congestion, ear pain, hearing loss, postnasal drip, rhinorrhea, sinus pressure, sore throat and tinnitus.    Eyes:  Negative for redness, itching and visual disturbance.   Respiratory:  Negative for cough, chest tightness and shortness of breath.    Cardiovascular:  Negative for chest pain and palpitations.   Gastrointestinal:  Negative for abdominal pain, constipation, diarrhea, nausea and vomiting.   Genitourinary:  Negative for decreased urine volume, difficulty urinating, dysuria, frequency, hematuria and urgency.   Musculoskeletal:  Negative for back pain, myalgias, neck pain and neck stiffness.   Skin:  Negative for rash.   Neurological:  Negative for dizziness, light-headedness and headaches.   Psychiatric/Behavioral: Negative.         Objective:      Physical Exam  Vitals and nursing note reviewed.   Constitutional:       General: He is not in acute distress.     Appearance: Normal appearance. He is well-developed. He is not diaphoretic.   HENT:      Head: Normocephalic and atraumatic.      Right Ear: External ear normal.      Left Ear: External ear normal.      Nose: Nose normal.      Mouth/Throat:      Pharynx: No oropharyngeal exudate.   Eyes:      General: No scleral icterus.        Right eye: No discharge.         Left eye: No discharge.      Conjunctiva/sclera: Conjunctivae normal.      Pupils: Pupils are equal, round, and reactive to light.   Neck:      Thyroid: No thyromegaly.      Vascular: No JVD.      Trachea: No tracheal deviation.   Cardiovascular:      Rate and Rhythm: Normal rate and regular rhythm.      Heart sounds: Normal heart sounds. No murmur heard.     No  friction rub. No gallop.   Pulmonary:      Effort: Pulmonary effort is normal. No respiratory distress.      Breath sounds: Normal breath sounds. No stridor. No wheezing, rhonchi or rales.   Chest:      Chest wall: No tenderness.   Abdominal:      General: Bowel sounds are normal. There is no distension.      Palpations: Abdomen is soft. There is no mass.      Tenderness: There is no abdominal tenderness. There is no guarding or rebound.   Musculoskeletal:         General: No tenderness. Normal range of motion.      Cervical back: Normal range of motion and neck supple.   Lymphadenopathy:      Cervical: No cervical adenopathy.   Skin:     General: Skin is warm and dry.      Coloration: Skin is not pale.      Findings: No erythema or rash.   Neurological:      Mental Status: He is alert and oriented to person, place, and time.   Psychiatric:         Mood and Affect: Mood normal.         Behavior: Behavior normal.         Thought Content: Thought content normal.         Judgment: Judgment normal.         Assessment:       1. Hospital discharge follow-up    2. Herniated lumbar intervertebral disc    3. Lumbar facet arthropathy    4. Neuroforaminal stenosis of lumbar spine    5. Need for prophylactic vaccination and inoculation against influenza        Plan:         1. Hospital records have been reviewed.  2. Continue follow-up with back and spine and healthy back as scheduled.  Patient is back pain is stable.    3. Flu vaccine given.  4. Return to clinic as needed or in 4 months for annual physical exam.

## 2023-10-03 ENCOUNTER — CLINICAL SUPPORT (OUTPATIENT)
Dept: REHABILITATION | Facility: HOSPITAL | Age: 58
End: 2023-10-03
Payer: COMMERCIAL

## 2023-10-03 DIAGNOSIS — Z74.09 IMPAIRED MOBILITY AND ADLS: ICD-10-CM

## 2023-10-03 DIAGNOSIS — Z78.9 IMPAIRED MOBILITY AND ADLS: ICD-10-CM

## 2023-10-03 PROCEDURE — 97112 NEUROMUSCULAR REEDUCATION: CPT

## 2023-10-03 PROCEDURE — 97110 THERAPEUTIC EXERCISES: CPT

## 2023-10-03 NOTE — PROGRESS NOTES
"  [facility]   Physical Therapy Treatment Note      Name: Calvin LOPEZ [facility]  Clinic Number: [id number]    Therapy Diagnosis:   Encounter Diagnosis   Name Primary?    Impaired mobility and ADLs      Physician: Any Thomas MD    Visit Date: 10/3/2023    Physician Orders: PT Eval and Treat   Medical Diagnosis from Referral: M47.816 (ICD-10-CM) - Lumbar spondylosis M43.16 (ICD-10-CM) - Spondylolisthesis of lumbar region   Evaluation Date: 9/21/2023  Authorization Period Expiration: 10/30/2023   Plan of Care Expiration: 11/3/2023  Progress Note Due: 10/19/2023  Date of Surgery: n/a  Visit # / Visits authorized: 1/ 1   FOTO: 1/ 3    PTA Visit #: 0/5     Precautions: Standard      Time In: 7:03 AM  Time Out: 7:47 AM  Total Billable Time: 44 minutes      Subjective     Patient reports: generally feeling good since eval visit but he did wake up with discomfort this morning which is "not normal".  He was compliant with home exercise program.  Response to previous treatment: no adverse response  Functional change: ongoing    Pain: 2/10  Location: bilateral back      Objective      Objective Measures updated at progress report unless specified.     Treatment     Calvin received the treatments listed below:      therapeutic exercises to develop strength, ROM, flexibility, and core stabilization for 29 minutes including:  - treadmill 2.5 speed x 5 min  - lower trunk rotation x 10 bilaterally  - open book stretch x 5 bilaterally  - hamstring stretch with strap 20" x 2 bilaterally  - seated forward flexion with blue stability ball x 10 reps  - extension in prone x 10  - leg press 100# x 20  - side stepping with green band 10' x 2 laps  - seated hip IR / ER with red band x 15 bilaterally      neuromuscular re-education activities to improve: Posture for 15 minutes. The following activities were included:  - posterior pelvic tilt x 15  - dead bug with stability ball 5 x 2 reps  - bird dog 5 x 2 reps      Patient " Education and Home Exercises       Education provided:   - stretching daily, not overdoing strengthening    Written Home Exercises Provided: Patient instructed to cont prior HEP. Exercises were reviewed and Calvin was able to demonstrate them prior to the end of the session.  Calvin demonstrated good  understanding of the education provided. See Electronic Medical Record under Patient Instructions for exercises provided during therapy sessions    Assessment     Calvin tolerated session well, pt was able to perform all exercise without onset of pain, pain reduced by end of session.  Muscle soreness may be due to increased reps/sets performed by patient, pt educated to stretch daily and do strengthening exercises every other day.     Calvin Is progressing well towards his goals.   Patient prognosis is Good.     Patient will continue to benefit from skilled outpatient physical therapy to address the deficits listed in the problem list box on initial evaluation, provide pt/family education and to maximize pt's level of independence in the home and community environment.     Patient's spiritual, cultural and educational needs considered and pt agreeable to plan of care and goals.     Anticipated barriers to physical therapy: anxiety    Goals:   Short Term Goals: 3 weeks   1. Patient will demonstrate good transverse abdominal muscle contraction for improved deep abdominal strength and lumbar stability. (PROGRESSING, NOT MET)   2. Increase lumbar ROM to 100% of WNL in order to improve functional mobility.   (PROGRESSING, NOT MET)   3. Patient will demonstrate good sitting/standing posture and body mechanics for improved spine health and decreased risk of future injury. (PROGRESSING, NOT MET)   4. Patient will improve bilateral lower extremity MMT grades by >/=1/2 grade in order to improve strength for standing ADLs. (PROGRESSING, NOT MET)   5. Patient will be compliant with home exercise program to supplement therapy in promoting  functional mobility. (PROGRESSING, NOT MET)      Long Term Goals: 6 Weeks  1. Patient will improve FOTO score to </= 31% limited to decrease perceived limitation with maintaining/changing body position.  (PROGRESSING, NOT MET)   2. Patient will be 100% compliant with updated HEP in order to maximize PT benefits post-discharge. (PROGRESSING, NOT MET)   3. Patient will improve bilateral lower extremity MMT grades by >/=1 grade in order to improve strength for standing activities of daily living. (PROGRESSING, NOT MET)   4. Patient will report pain at worst over 2-week period as </=2/10 in order to improve general activity tolerance. (PROGRESSING, NOT MET)   5. Pt will begin some form of home/community fitness in order to sustain progress gained in physical therapy. (PROGRESSING, NOT MET)     Plan     Continued present POC.    Judie Oden, PT

## 2023-10-05 RX ORDER — FLUTICASONE PROPIONATE 50 MCG
SPRAY, SUSPENSION (ML) NASAL
Qty: 48 G | Refills: 3 | Status: SHIPPED | OUTPATIENT
Start: 2023-10-05 | End: 2024-03-08 | Stop reason: SDUPTHER

## 2023-10-05 NOTE — TELEPHONE ENCOUNTER
Refill Decision Note   Calvin Luis  is requesting a refill authorization.  Brief Assessment and Rationale for Refill:  Approve     Medication Therapy Plan:         Comments:     Note composed:8:22 AM 10/05/2023

## 2023-10-05 NOTE — TELEPHONE ENCOUNTER
No care due was identified.  [facility] Embedded Care Due Messages. Reference number: [id number].   10/04/2023 8:11:13 PM CDT

## 2023-10-12 ENCOUNTER — CLINICAL SUPPORT (OUTPATIENT)
Dept: REHABILITATION | Facility: HOSPITAL | Age: 58
End: 2023-10-12
Payer: COMMERCIAL

## 2023-10-12 DIAGNOSIS — Z78.9 IMPAIRED MOBILITY AND ADLS: Primary | ICD-10-CM

## 2023-10-12 DIAGNOSIS — Z74.09 IMPAIRED MOBILITY AND ADLS: Primary | ICD-10-CM

## 2023-10-12 PROCEDURE — 97112 NEUROMUSCULAR REEDUCATION: CPT

## 2023-10-12 PROCEDURE — 97110 THERAPEUTIC EXERCISES: CPT

## 2023-10-12 NOTE — PROGRESS NOTES
"    [facility]   Physical Therapy Treatment Note      Name: Calvin LOPEZ [facility]  Clinic Number: [id number]    Therapy Diagnosis:   Encounter Diagnosis   Name Primary?    Impaired mobility and ADLs Yes     Physician: Any Thomas MD    Visit Date: 10/12/2023    Physician Orders: PT Eval and Treat   Medical Diagnosis from Referral: M47.816 (ICD-10-CM) - Lumbar spondylosis M43.16 (ICD-10-CM) - Spondylolisthesis of lumbar region   Evaluation Date: 9/21/2023  Authorization Period Expiration: 10/30/2023   Plan of Care Expiration: 11/3/2023  Progress Note Due: 10/19/2023  Date of Surgery: n/a  Visit # / Visits authorized: 1/ 1   FOTO: 1/ 3    PTA Visit #: 0/5     Precautions: Standard      Time In: 3:15 PM  Time Out: 4:00 PM  Total Billable Time: 45 minutes      Subjective     Patient reports: Pt denies pain at start of session, still "afraid" to bend forward  He was compliant with home exercise program.  Response to previous treatment: no adverse response  Functional change: ongoing    Pain: 2/10  Location: bilateral back      Objective      Objective Measures updated at progress report unless specified.     Treatment     Calvin received the treatments listed below:      therapeutic exercises to develop strength, ROM, flexibility, and core stabilization for 20 minutes including:  - treadmill 2.5 speed x 5 min  - lower trunk rotation x 10 bilaterally  - open book stretch x 5 bilaterally ; 5 with red band   - hamstring stretch with strap 30" x 2 bilaterally  - extension in prone x 10  - side stepping with green band 10' x 2 laps  - cat / camel stretch x 5 each direction   Not today:  - seated hip IR / ER with red band x 15 bilaterally      neuromuscular re-education activities to improve: Posture for 25 minutes. The following activities were included:  - posterior pelvic tilt x 15  - PPT + bridge with blue band 2 x 10  - dead bug with stability ball 5 x 2 reps  - bird dog 5 x 2 reps  - Paloff press with " 10# on cable column 2 x 10 reps  - low to high chop with yellow band x 10 bilaterally      Patient Education and Home Exercises       Education provided:   - stretching daily, not overdoing strengthening    Written Home Exercises Provided: Patient instructed to cont prior HEP. Exercises were reviewed and Calvin was able to demonstrate them prior to the end of the session.  Calvin demonstrated good  understanding of the education provided. See Electronic Medical Record under Patient Instructions for exercises provided during therapy sessions    Assessment     Calvin tolerated session well, pt was able to perform all exercise without onset of pain.  Mobility of lumbar spine is limited into both flexion and extension.  Initiated rotation motion to strengthen musculature as well as improve confidence in motion.  Skilled physical therapy remains appropriate.     Calvin Is progressing well towards his goals.   Patient prognosis is Good.     Patient will continue to benefit from skilled outpatient physical therapy to address the deficits listed in the problem list box on initial evaluation, provide pt/family education and to maximize pt's level of independence in the home and community environment.     Patient's spiritual, cultural and educational needs considered and pt agreeable to plan of care and goals.     Anticipated barriers to physical therapy: anxiety    Goals:   Short Term Goals: 3 weeks   1. Patient will demonstrate good transverse abdominal muscle contraction for improved deep abdominal strength and lumbar stability. (PROGRESSING, NOT MET)   2. Increase lumbar ROM to 100% of WNL in order to improve functional mobility.   (PROGRESSING, NOT MET)   3. Patient will demonstrate good sitting/standing posture and body mechanics for improved spine health and decreased risk of future injury. (PROGRESSING, NOT MET)   4. Patient will improve bilateral lower extremity MMT grades by >/=1/2 grade in order to improve strength for  standing ADLs. (PROGRESSING, NOT MET)   5. Patient will be compliant with home exercise program to supplement therapy in promoting functional mobility. (PROGRESSING, NOT MET)      Long Term Goals: 6 Weeks  1. Patient will improve FOTO score to </= 31% limited to decrease perceived limitation with maintaining/changing body position.  (PROGRESSING, NOT MET)   2. Patient will be 100% compliant with updated HEP in order to maximize PT benefits post-discharge. (PROGRESSING, NOT MET)   3. Patient will improve bilateral lower extremity MMT grades by >/=1 grade in order to improve strength for standing activities of daily living. (PROGRESSING, NOT MET)   4. Patient will report pain at worst over 2-week period as </=2/10 in order to improve general activity tolerance. (PROGRESSING, NOT MET)   5. Pt will begin some form of home/community fitness in order to sustain progress gained in physical therapy. (PROGRESSING, NOT MET)     Plan     Continued present POC.    Judie Oden, PT

## 2023-10-17 ENCOUNTER — CLINICAL SUPPORT (OUTPATIENT)
Dept: REHABILITATION | Facility: HOSPITAL | Age: 58
End: 2023-10-17
Payer: COMMERCIAL

## 2023-10-17 DIAGNOSIS — Z78.9 IMPAIRED MOBILITY AND ADLS: Primary | ICD-10-CM

## 2023-10-17 DIAGNOSIS — Z74.09 IMPAIRED MOBILITY AND ADLS: Primary | ICD-10-CM

## 2023-10-17 PROCEDURE — 97112 NEUROMUSCULAR REEDUCATION: CPT

## 2023-10-17 PROCEDURE — 97110 THERAPEUTIC EXERCISES: CPT

## 2023-10-17 NOTE — PROGRESS NOTES
"      [facility]   Physical Therapy Treatment Note      Name: Calvin LOPEZ [facility]  Clinic Number: [id number]    Therapy Diagnosis:   Encounter Diagnosis   Name Primary?    Impaired mobility and ADLs Yes     Physician: Any Thomas MD    Visit Date: 10/17/2023    Physician Orders: PT Eval and Treat   Medical Diagnosis from Referral: M47.816 (ICD-10-CM) - Lumbar spondylosis M43.16 (ICD-10-CM) - Spondylolisthesis of lumbar region   Evaluation Date: 9/21/2023  Authorization Period Expiration: 10/30/2023   Plan of Care Expiration: 11/3/2023  Progress Note Due: 10/19/2023  Date of Surgery: n/a  Visit # / Visits authorized: 1/ 1   FOTO: 1/ 3    PTA Visit #: 0/5     Precautions: Standard      Time In: 7:50 AM  Time Out: 8:30 AM  Total Billable Time: 40 minutes      Subjective     Patient reports: Pt denies pain at start of session, had one episode of back pain over the weekend after prolonged standing.  Found relief by getting in bed and stretching knees to chest for about 30 min.  He was compliant with home exercise program.  Response to previous treatment: no adverse response  Functional change: ongoing    Pain: 0/10  Location: bilateral back      Objective      Objective Measures updated at progress report unless specified.     Treatment     Calvin received the treatments listed below:      therapeutic exercises to develop strength, ROM, flexibility, and core stabilization for 20 minutes including:  - treadmill 2.5 speed x 5 min  - lower trunk rotation x 10 bilaterally  - hamstring stretch with strap 30" x 2 bilaterally  - extension in prone x 10  - side stepping with green band 10' x 2 laps  - cat / camel stretch x 5 each direction     Not today:  - open book stretch x 5 bilaterally ; 5 with red band     neuromuscular re-education activities to improve: Posture for 25 minutes. The following activities were included:  - posterior pelvic tilt x 15  - PPT + bridge with blue band 2 x 10  - dead bug with " stability ball 5 x 2 reps  - bird dog 5 x 2 reps  - Paloff press with 10# on cable column 2 x 10 reps  - low to high chop with red band x 10 bilaterally      Patient Education and Home Exercises       Education provided:   - stretching daily, not overdoing strengthening    Written Home Exercises Provided: Patient instructed to cont prior HEP. Exercises were reviewed and Calvin was able to demonstrate them prior to the end of the session.  Calvin demonstrated good  understanding of the education provided. See Electronic Medical Record under Patient Instructions for exercises provided during therapy sessions    Assessment     Calvin tolerated session well, pt was able to perform all exercise without onset of pain.  Mobility of lumbar spine is limited into both flexion and extension. Pt moving into flexed position with less anxiety as compared to last session.  Skilled physical therapy remains appropriate.     Calvin Is progressing well towards his goals.   Patient prognosis is Good.     Patient will continue to benefit from skilled outpatient physical therapy to address the deficits listed in the problem list box on initial evaluation, provide pt/family education and to maximize pt's level of independence in the home and community environment.     Patient's spiritual, cultural and educational needs considered and pt agreeable to plan of care and goals.     Anticipated barriers to physical therapy: anxiety    Goals:   Short Term Goals: 3 weeks   1. Patient will demonstrate good transverse abdominal muscle contraction for improved deep abdominal strength and lumbar stability. (PROGRESSING, NOT MET)   2. Increase lumbar ROM to 100% of WNL in order to improve functional mobility.   (PROGRESSING, NOT MET)   3. Patient will demonstrate good sitting/standing posture and body mechanics for improved spine health and decreased risk of future injury. (PROGRESSING, NOT MET)   4. Patient will improve bilateral lower extremity MMT grades by  >/=1/2 grade in order to improve strength for standing ADLs. (PROGRESSING, NOT MET)   5. Patient will be compliant with home exercise program to supplement therapy in promoting functional mobility. (PROGRESSING, NOT MET)      Long Term Goals: 6 Weeks  1. Patient will improve FOTO score to </= 31% limited to decrease perceived limitation with maintaining/changing body position.  (PROGRESSING, NOT MET)   2. Patient will be 100% compliant with updated HEP in order to maximize PT benefits post-discharge. (PROGRESSING, NOT MET)   3. Patient will improve bilateral lower extremity MMT grades by >/=1 grade in order to improve strength for standing activities of daily living. (PROGRESSING, NOT MET)   4. Patient will report pain at worst over 2-week period as </=2/10 in order to improve general activity tolerance. (PROGRESSING, NOT MET)   5. Pt will begin some form of home/community fitness in order to sustain progress gained in physical therapy. (PROGRESSING, NOT MET)     Plan     Continued present POC.    Judie Oden, PT

## 2023-10-24 ENCOUNTER — CLINICAL SUPPORT (OUTPATIENT)
Dept: REHABILITATION | Facility: HOSPITAL | Age: 58
End: 2023-10-24
Payer: COMMERCIAL

## 2023-10-24 DIAGNOSIS — Z78.9 IMPAIRED MOBILITY AND ADLS: Primary | ICD-10-CM

## 2023-10-24 DIAGNOSIS — Z74.09 IMPAIRED MOBILITY AND ADLS: Primary | ICD-10-CM

## 2023-10-24 PROCEDURE — 97110 THERAPEUTIC EXERCISES: CPT

## 2023-10-24 PROCEDURE — 97112 NEUROMUSCULAR REEDUCATION: CPT

## 2023-10-24 NOTE — PROGRESS NOTES
[facility]   Physical Therapy Treatment Note      [id number]    Therapy Diagnosis:   Encounter Diagnosis   Name Primary?    Impaired mobility and ADLs Yes     Physician: Any Thomas MD    Visit Date: 10/24/2023    Physician Orders: PT Eval and Treat   Medical Diagnosis from Referral: M47.816 (ICD-10-CM) - Lumbar spondylosis M43.16 (ICD-10-CM) - Spondylolisthesis of lumbar region   Evaluation Date: 9/21/2023  Authorization Period Expiration: 10/30/2023   Plan of Care Expiration: 11/3/2023  Progress Note Due: 11/21/2023  Date of Surgery: n/a  Visit # / Visits authorized: 4/20 (+ eval)  FOTO: 1/ 3    PTA Visit #: 0/5     Precautions: Standard      Time In: 1:45 PM  Time Out: 2:30 PM  Total Billable Time: 40 minutes      Subjective     Patient reports: Pt denies pain at start of session, had one episode of back pain over the weekend after prolonged standing.  Found relief by getting in bed and stretching knees to chest for about 30 min.  He was compliant with home exercise program.  Response to previous treatment: no adverse response  Functional change: ongoing    Pain: 0/10  Location: bilateral back      Objective      Objective Measures updated at progress report unless specified.     Lumbar Range of Motion:     Degrees Pain   Flexion Min loss    Tightness         Extension Minimal loss    No pain         Left Side Bending Min loss          Right Side Bending Min loss           Left rotation    Min loss          Right Rotation    Min loss                Lower Extremity Strength  Right LE   Left LE     Knee extension: 5/5 Knee extension: 5/5   Knee flexion: 5/5 Knee flexion: 5/5   Hip flexion: 4+/5 Hip flexion: 4+/5   Hip extension:  4+/5 Hip extension: 4+/5   Hip abduction: 4+/5 Hip abduction: 4+/5   Hip adduction: 5/5 Hip adduction 5/5   Ankle dorsiflexion: 5/5 Ankle dorsiflexion: 5/5   Ankle plantarflexion: 5/5  (NWB) Ankle plantarflexion: 5/5   "(NWB)        Treatment     Calvin received the treatments listed below:      therapeutic exercises to develop strength, ROM, flexibility, and core stabilization for 20 minutes including:  - treadmill 2.5 speed x 5 min  - lower trunk rotation x 10 bilaterally  - hamstring stretch with strap 30" x 2 bilaterally  - piriformis stretch 20" x 3 bilaterally  - open book stretch x 5 bilaterally ; 5 with red band  - extension in prone x 10  - cat / camel stretch x 5 each direction   - hip hinge 2 x 10 reps    Not today:   - side stepping with green band 10' x 2 laps    neuromuscular re-education activities to improve: Posture for 25 minutes. The following activities were included:  - posterior pelvic tilt x 15  - PPT + bridge with blue band 2 x 10  -  alternating march with PPT 2 x 10  - bird dog 5 x 2 reps  - Paloff press with 10# on cable column 2 x 10 reps  - low to high chop with red band x 10 bilaterally      Patient Education and Home Exercises       Education provided:   - stretching daily, not overdoing strengthening    Written Home Exercises Provided: Patient instructed to cont prior HEP. Exercises were reviewed and Calvin was able to demonstrate them prior to the end of the session.  Calvin demonstrated good  understanding of the education provided. See Electronic Medical Record under Patient Instructions for exercises provided during therapy sessions    Assessment     Calvin tolerated session well, pt was able to perform all exercise without onset of pain.  Pt continues to present with anterior pelvic tilt and decreased mobility of lumbar spine.  Overall range of motion has improved since first visit.   Skilled physical therapy remains appropriate.     Calvin Is progressing well towards his goals.   Patient prognosis is Good.     Patient will continue to benefit from skilled outpatient physical therapy to address the deficits listed in the problem list box on initial evaluation, provide pt/family education and to maximize pt's " level of independence in the home and community environment.     Patient's spiritual, cultural and educational needs considered and pt agreeable to plan of care and goals.     Anticipated barriers to physical therapy: anxiety    Goals:   Short Term Goals: 3 weeks   1. Patient will demonstrate good transverse abdominal muscle contraction for improved deep abdominal strength and lumbar stability. (PROGRESSING, NOT MET)   2. Increase lumbar ROM to 100% of WNL in order to improve functional mobility.   (PROGRESSING, NOT MET)   3. Patient will demonstrate good sitting/standing posture and body mechanics for improved spine health and decreased risk of future injury. (PROGRESSING, NOT MET)   4. Patient will improve bilateral lower extremity MMT grades by >/=1/2 grade in order to improve strength for standing ADLs. (PROGRESSING, NOT MET)   5. Patient will be compliant with home exercise program to supplement therapy in promoting functional mobility. (PROGRESSING, NOT MET)      Long Term Goals: 6 Weeks  1. Patient will improve FOTO score to </= 31% limited to decrease perceived limitation with maintaining/changing body position.  (PROGRESSING, NOT MET)   2. Patient will be 100% compliant with updated HEP in order to maximize PT benefits post-discharge. (PROGRESSING, NOT MET)   3. Patient will improve bilateral lower extremity MMT grades by >/=1 grade in order to improve strength for standing activities of daily living. (PROGRESSING, NOT MET)   4. Patient will report pain at worst over 2-week period as </=2/10 in order to improve general activity tolerance. (PROGRESSING, NOT MET)   5. Pt will begin some form of home/community fitness in order to sustain progress gained in physical therapy. (PROGRESSING, NOT MET)     Plan     Continued present POC.    Judie Oden, PT

## 2023-10-31 ENCOUNTER — OFFICE VISIT (OUTPATIENT)
Dept: SPINE | Facility: CLINIC | Age: 58
End: 2023-10-31
Payer: COMMERCIAL

## 2023-10-31 VITALS
OXYGEN SATURATION: 100 % | DIASTOLIC BLOOD PRESSURE: 70 MMHG | TEMPERATURE: 98 F | BODY MASS INDEX: 26.26 KG/M2 | SYSTOLIC BLOOD PRESSURE: 120 MMHG | RESPIRATION RATE: 18 BRPM | WEIGHT: 183 LBS | HEART RATE: 99 BPM

## 2023-10-31 DIAGNOSIS — M43.17 SPONDYLOLISTHESIS OF LUMBOSACRAL REGION: Primary | ICD-10-CM

## 2023-10-31 DIAGNOSIS — M79.18 MYOFASCIAL PAIN SYNDROME: ICD-10-CM

## 2023-10-31 PROCEDURE — 3008F BODY MASS INDEX DOCD: CPT | Mod: CPTII,S$GLB,, | Performed by: STUDENT IN AN ORGANIZED HEALTH CARE EDUCATION/TRAINING PROGRAM

## 2023-10-31 PROCEDURE — 1159F PR MEDICATION LIST DOCUMENTED IN MEDICAL RECORD: ICD-10-PCS | Mod: CPTII,S$GLB,, | Performed by: STUDENT IN AN ORGANIZED HEALTH CARE EDUCATION/TRAINING PROGRAM

## 2023-10-31 PROCEDURE — 3008F PR BODY MASS INDEX (BMI) DOCUMENTED: ICD-10-PCS | Mod: CPTII,S$GLB,, | Performed by: STUDENT IN AN ORGANIZED HEALTH CARE EDUCATION/TRAINING PROGRAM

## 2023-10-31 PROCEDURE — 3078F DIAST BP <80 MM HG: CPT | Mod: CPTII,S$GLB,, | Performed by: STUDENT IN AN ORGANIZED HEALTH CARE EDUCATION/TRAINING PROGRAM

## 2023-10-31 PROCEDURE — 3078F PR MOST RECENT DIASTOLIC BLOOD PRESSURE < 80 MM HG: ICD-10-PCS | Mod: CPTII,S$GLB,, | Performed by: STUDENT IN AN ORGANIZED HEALTH CARE EDUCATION/TRAINING PROGRAM

## 2023-10-31 PROCEDURE — 3074F SYST BP LT 130 MM HG: CPT | Mod: CPTII,S$GLB,, | Performed by: STUDENT IN AN ORGANIZED HEALTH CARE EDUCATION/TRAINING PROGRAM

## 2023-10-31 PROCEDURE — 3044F HG A1C LEVEL LT 7.0%: CPT | Mod: CPTII,S$GLB,, | Performed by: STUDENT IN AN ORGANIZED HEALTH CARE EDUCATION/TRAINING PROGRAM

## 2023-10-31 PROCEDURE — 3044F PR MOST RECENT HEMOGLOBIN A1C LEVEL <7.0%: ICD-10-PCS | Mod: CPTII,S$GLB,, | Performed by: STUDENT IN AN ORGANIZED HEALTH CARE EDUCATION/TRAINING PROGRAM

## 2023-10-31 PROCEDURE — 99213 OFFICE O/P EST LOW 20 MIN: CPT | Mod: S$GLB,,, | Performed by: STUDENT IN AN ORGANIZED HEALTH CARE EDUCATION/TRAINING PROGRAM

## 2023-10-31 PROCEDURE — 99999 PR PBB SHADOW E&M-EST. PATIENT-LVL III: CPT | Mod: PBBFAC,,, | Performed by: STUDENT IN AN ORGANIZED HEALTH CARE EDUCATION/TRAINING PROGRAM

## 2023-10-31 PROCEDURE — 1160F RVW MEDS BY RX/DR IN RCRD: CPT | Mod: CPTII,S$GLB,, | Performed by: STUDENT IN AN ORGANIZED HEALTH CARE EDUCATION/TRAINING PROGRAM

## 2023-10-31 PROCEDURE — 1159F MED LIST DOCD IN RCRD: CPT | Mod: CPTII,S$GLB,, | Performed by: STUDENT IN AN ORGANIZED HEALTH CARE EDUCATION/TRAINING PROGRAM

## 2023-10-31 PROCEDURE — 1160F PR REVIEW ALL MEDS BY PRESCRIBER/CLIN PHARMACIST DOCUMENTED: ICD-10-PCS | Mod: CPTII,S$GLB,, | Performed by: STUDENT IN AN ORGANIZED HEALTH CARE EDUCATION/TRAINING PROGRAM

## 2023-10-31 PROCEDURE — 99213 PR OFFICE/OUTPT VISIT, EST, LEVL III, 20-29 MIN: ICD-10-PCS | Mod: S$GLB,,, | Performed by: STUDENT IN AN ORGANIZED HEALTH CARE EDUCATION/TRAINING PROGRAM

## 2023-10-31 PROCEDURE — 3074F PR MOST RECENT SYSTOLIC BLOOD PRESSURE < 130 MM HG: ICD-10-PCS | Mod: CPTII,S$GLB,, | Performed by: STUDENT IN AN ORGANIZED HEALTH CARE EDUCATION/TRAINING PROGRAM

## 2023-10-31 PROCEDURE — 99999 PR PBB SHADOW E&M-EST. PATIENT-LVL III: ICD-10-PCS | Mod: PBBFAC,,, | Performed by: STUDENT IN AN ORGANIZED HEALTH CARE EDUCATION/TRAINING PROGRAM

## 2023-10-31 NOTE — PROGRESS NOTES
Chronic patient Established Note (Follow up visit)      SUBJECTIVE:    Calvin Smith presents to the clinic for a follow-up appointment for lower back pain. Since the last visit, Calvin Smith states the pain has been improving. Current pain intensity is 0/10.    Pain Disability Index Review:      10/31/2023     9:23 AM   Last 3 PDI Scores   Pain Disability Index (PDI) 0       INTERVAL HISTORY 9/19/23:  Mr. Smith presents today with history of lower back pain. His current pain level is 0/10.  He feels his pain is better controlled at this point.  He has been playing golf more frequently now. He reports he had an event recently and had a small exacerbation which went away with his tizanidine and ibuprofen.  He feels satisfied with his improvement. He is looking forward to a golf tournament in December.  He still has his first appointment with [facility] in mid-November.    INTERVAL HISTORY 9/19/23:  He states that he has been trying to get with physical therapy, however he has been having issues with scheduling.  He has started some exercises at home, and he feels his pain has been improving.  He reports that he has been able to take less of the tizanidine and ibuprofen. He is anxious about his hip, as he was told by a surgeon that he may have a hip fracture.  He does admit to some soreness around the left hip.  He also admits that he does sometimes wake up when he turns to the left or right side.    INITIAL HPI 9/12/23:  Calvin Smith presents to the clinic for the evaluation of lower back pain. He states he has had back pain for 20 years, however he has had exacerbations off/on.  He states that the pain is just in the lower back (no radiation down his legs). He presented to the emergency room on 9/7/23 due to the most severe exacerbation while toweling himself off after a bath.The pain is located in the lower back area and radiates to the sides of the back (not down the legs).  The pain is described  as aching and is rated as 6/10. The pain is rated with a score of  0/10 on the BEST day and a score of 10/10 on the WORST day.  Symptoms interfere with daily activity. The pain is exacerbated by Extension and Flexing.  The pain is mitigated by medications.  The patient reports 4 hours of uninterrupted sleep per night (not related to pain).     Patient denies urinary incontinence, bowel incontinence, and significant motor weakness.      He states he is playing more golf.  He presented an MRI with on 1st appearance seems similar to MRI from a few days ago. He felt the treatment he received at the emergency department helped considerably with his pain, however he still has some residual pain.  He does feel this is improving with time.     Physical Therapy/Home Exercise: yes, went last in 2018, states that he is not consistent with exercises at home       Pain Medications:   - ibuprofen 600mg   - robaxin 500mg      report:  Reviewed and consistent with medication use as prescribed.     Pain Procedures:   Had Prior Epidural with no relief.     Imaging:   XR HIPS BILATERAL 2 VIEW INCL AP PELVIS     CLINICAL HISTORY:  Trochanteric bursitis, unspecified hip     TECHNIQUE:  AP view of the pelvis and frogleg lateral views of both hips were performed.     COMPARISON:  None     FINDINGS:  Femoral heads are well located with respect to the acetabula.  Femoral heads maintain a normal round contour.  No acute fracture seen.  Mild osteophyte formation on the left without significant joint space narrowing.     Impression:     No acute osseous abnormality seen.        Electronically signed by: Jacque Oropeza  Date:                                            09/19/2023  Time:                                           10:21    MRI LUMBAR SPINE WITHOUT CONTRAST     CLINICAL HISTORY:  Spinal stenosis, lumbar;     TECHNIQUE:  Multiplanar, multisequence MR images were acquired from the thoracolumbar junction to the sacrum without  contrast.     COMPARISON:  CT 09/06/2023, x-ray 09/06/2023     FINDINGS:  Alignment: Mild levo scoliosis.  Minimal grade 1 retrolisthesis L4-5.  Bilateral spondylolysis L5 with grade 1 anterolisthesis L5-S1.     Vertebrae: Benign osseous hemangioma at L4.  Chronic degenerative endplate change at L4-L5 and L5-S1.     Discs: Degenerative disc desiccation from L3-L4 through L5-S1 with mild L4-L5 and severe L5-S1 height loss.     Cord: Conus terminates at L1 and appears unremarkable.  Cauda equina appears unremarkable.     Degenerative findings:     *T12-L1: No spinal canal stenosis or neural foraminal narrowing.  *L1-L2: No spinal canal stenosis or neural foraminal narrowing.  *L2-L3: No spinal canal stenosis or neural foraminal narrowing.  *L3-L4: Mild broad-based posterior disc bulge with right subarticular protrusion that causes mass effect on the right lateral recess and may impinge upon the right descending L4 nerve root.  Bilateral facet arthropathy and bilateral ligamentum flavum hypertrophy.  Mild spinal canal and moderate right lateral recess stenosis.  *L4-L5: Circumferential disc bulge.  Bilateral facet arthropathy and bilateral ligamentum flavum hypertrophy.  Mild-to-moderate bilateral lateral recess stenosis with possible impingement of the right descending L5 nerve root.  Moderate right and mild left neural foraminal narrowing.  *L5-S1: Grade 1 anterolisthesis with uncovering of the intervertebral disc.  Bilateral facet arthropathy.  No spinal canal stenosis.  Moderate to severe bilateral neural foraminal narrowing with suspected impingement of the exiting L5 nerve roots.  Paraspinal muscles & soft tissues: Right simple renal cyst.     Impression:     1. Bilateral L5 spondylolysis with grade 1 anterolisthesis of L5 on S1 contributing to moderate to severe bilateral neural foraminal narrowing with suspected impingement of the exiting L5 nerve roots.  2. Additional lumbar spondylosis at L3-L4 and L4-L5 as  detailed above.     Electronically signed by resident: Phil Mccray  Date:                                            09/07/2023  Time:                                           14:11     Electronically signed by: Myles Orozco MD  Date:                                            09/07/2023  Time:                                           15:43     CT LUMBAR SPINE WITHOUT CONTRAST     CLINICAL HISTORY:  Low back pain, no red flags, no prior management;     TECHNIQUE:  Low-dose axial, sagittal and coronal reformations are obtained through the lumbar spine.  Contrast was not administered.     COMPARISON:  X-ray 09/06/2023     FINDINGS:  No acute fractures of the lumbar spine.     Moderate disc space narrowing at L5-S1.  Grade 1 spondylolisthesis of L5 on S1.  Minimal grade 1 retrolisthesis of L4 on L5.     Bilateral spondylolysis of L5 with associated facet arthropathy at L5-S1 and L4-5.     L1-2: No significant abnormality.     L2-3: No significant abnormality.     L3-4: Mild diffuse disc protrusion.  Mild ligamentum flavum hypertrophy.  Severe central canal narrowing.  Neural foramen are adequately maintained.     L4-5: Mild diffuse posterior disc osteophyte complex with mild disc protrusion.  Bilateral facet arthropathy.  Mild-moderate central canal narrowing.  Severe right foraminal narrowing.     L5-S1: Bilateral spondylolysis with grade 1 spondylolisthesis and slight uncovering of the disc posteriorly with mild protrusion.  Severe bilateral foraminal narrowing.  Central canal is adequately maintained.     The remaining visualized paravertebral structures demonstrate no pathology.     Impression:     No acute abnormality.     Multilevel chronic and degenerative changes.  See above comments.        Electronically signed by: Owen Sinclair  Date:                                            09/06/2023  Time:                                           20:05     XR LUMBAR SPINE AP AND LATERAL     CLINICAL  HISTORY:  low back pain;     TECHNIQUE:  AP, lateral and spot images were performed of the lumbar spine.     COMPARISON:  None     FINDINGS:  Five lumbar vertebral bodies.  Vertebral body heights are maintained.     Disc space narrowing and endplate changes L5-S1.     Grade 1 anterolisthesis L5-S1 suspected to be on account of pars defects.     Impression:     Please see above.        Electronically signed by: Jacque Oropeza  Date:                                            09/06/2023  Time:                                           16:35    Allergies: Review of patient's allergies indicates:  No Known Allergies    Current Medications:   Current Outpatient Medications   Medication Sig Dispense Refill    aspirin (ECOTRIN) 81 MG EC tablet Take 1 tablet (81 mg total) by mouth once daily. 90 tablet 3    atorvastatin (LIPITOR) 10 MG tablet Take 1 tablet (10 mg total) by mouth once daily. 90 tablet 3    fluticasone propionate (FLONASE) 50 mcg/actuation nasal spray SHAKE LIQUID AND USE 1 SPRAY IN EACH NOSTRIL TWICE DAILY 48 g 3    ibuprofen (ADVIL,MOTRIN) 600 MG tablet Take 1 tablet (600 mg total) by mouth every 8 (eight) hours as needed for Pain. 90 tablet 2    LIDOcaine (LIDODERM) 5 % Place 2 patches onto the skin once daily. Remove & Discard patch within 12 hours or as directed by MD 15 patch 0    metFORMIN (GLUCOPHAGE-XR) 500 MG ER 24hr tablet Take 1 tablet (500 mg total) by mouth daily with breakfast. 90 tablet 3    scopolamine (TRANSDERM-SCOP) 1.3-1.5 mg (1 mg over 3 days) Place 1 patch onto the skin every 72 hours. 10 patch 0    sertraline (ZOLOFT) 25 MG tablet Take 1 tablet (25 mg total) by mouth once daily. 90 tablet 3    tiZANidine (ZANAFLEX) 4 MG tablet Take 1 tablet (4 mg total) by mouth 3 (three) times daily as needed (muscular pain). 90 tablet 2    walker Misc 1 each by Misc.(Non-Drug; Combo Route) route once. Rolling walker for 1 dose 1 each 0     No current facility-administered medications for this visit.        REVIEW OF SYSTEMS:    GENERAL:  No weight loss, malaise or fevers.  HEENT:  Negative for frequent or significant headaches.  NECK:  Negative for lumps, goiter, pain and significant neck swelling.  RESPIRATORY:  Negative for cough, wheezing or shortness of breath.  CARDIOVASCULAR:  Negative for chest pain, leg swelling or palpitations.  GI:  Negative for abdominal discomfort, blood in stools or black stools or change in bowel habits.  MUSCULOSKELETAL:  See HPI.  SKIN:  Negative for lesions, rash, and itching.  PSYCH:  Negative for sleep disturbance, mood disorder and recent psychosocial stressors.  HEMATOLOGY/LYMPHOLOGY:  Negative for prolonged bleeding, bruising easily or swollen nodes.  NEURO:   No history of headaches, syncope, paralysis, seizures or tremors.  All other reviewed and negative other than HPI.    Past Medical History:  Past Medical History:   Diagnosis Date    Anxiety     Colon polyp     Fatty liver     TIA (transient ischemic attack)        Past Surgical History:  Past Surgical History:   Procedure Laterality Date    APPENDECTOMY  2005    COLONOSCOPY  11/2019    EPIDURAL STEROID INJECTION INTO LUMBAR SPINE  2018    INJECTION OF ANESTHETIC AGENT AROUND MEDIAL BRANCH NERVES INNERVATING LUMBAR FACET JOINT  2019       Family History:  Family History   Problem Relation Age of Onset    No Known Problems Mother     Leukemia Father     No Known Problems Sister     No Known Problems Brother     No Known Problems Sister     No Known Problems Brother        Social History:  Social History     Socioeconomic History    Marital status:      Spouse name: Leigh Reese    Number of children: 2   Tobacco Use    Smoking status: Never    Smokeless tobacco: Never   Substance and Sexual Activity    Drug use: Never    Sexual activity: Yes   Social History Narrative    He is satisfied with weight.    Rates diet as fair to good.    He does not drink at least 1/2 gallon water daily.    He drinks 1-2  coffee/tea/caffeine-containing soft drinks daily.    Total sleep time at night is 6-7 hours (poor quality).    He works 40-50 hours per week.    He does wear seat belts.    Hobbies include tennis.     Social Determinants of Health     Financial Resource Strain: Low Risk  (9/7/2023)    Overall Financial Resource Strain (CARDIA)     Difficulty of Paying Living Expenses: Not hard at all   Food Insecurity: No Food Insecurity (9/7/2023)    Hunger Vital Sign     Worried About Running Out of Food in the Last Year: Never true     Ran Out of Food in the Last Year: Never true   Transportation Needs: No Transportation Needs (9/7/2023)    PRAPARE - Transportation     Lack of Transportation (Medical): No     Lack of Transportation (Non-Medical): No   Physical Activity: Sufficiently Active (9/7/2023)    Exercise Vital Sign     Days of Exercise per Week: 3 days     Minutes of Exercise per Session: 60 min   Stress: No Stress Concern Present (9/7/2023)    [employer] - Occupational Stress Questionnaire     Feeling of Stress : Only a little   Social Connections: Moderately Isolated (9/7/2023)    Social Connection and Isolation Panel [NHANES]     Frequency of Communication with Friends and Family: More than three times a week     Frequency of Social Gatherings with Friends and Family: Three times a week     Attends Taoist Services: Never     Active Member of Clubs or Organizations: No     Attends Club or Organization Meetings: Never     Marital Status:    Housing Stability: Low Risk  (9/7/2023)    Housing Stability Vital Sign     Unable to Pay for Housing in the Last Year: No     Number of Places Lived in the Last Year: 1     Unstable Housing in the Last Year: No       OBJECTIVE:    /70   Pulse 99   Temp 97.9 °F (36.6 °C) (Oral)   Resp 18   Wt 83 kg (182 lb 15.7 oz)   SpO2 100%   BMI 26.26 kg/m²     PHYSICAL EXAMINATION:  General appearance: Well appearing, in no acute distress, alert  and appropriately communicative.  Psych:  Mood and affect appropriate.  Skin: Skin color, texture, turgor normal, no rashes or lesions, in both upper and lower body.  Head/face:  Atraumatic, normocephalic.  Cor: regular rate  Pulm: non-labored breathing  GI: Abdomen non-distended and non-tender.  Back: Straight leg raising in the sitting and supine positions is negative to radicular pain. No pain to palpation over the spine or paraspinal muscles. No limitation in movement  Extremities: Peripheral joint ROM is full and pain free without obvious instability or laxity in all four extremities.  No deformities, edema, or skin discoloration. Good capillary refill.  Musculoskeletal: hip, sacroiliac and knee provocative maneuvers are negative. Bilateral upper and lower extremity strength is normal and symmetric.  No atrophy or tone abnormalities are noted.  Neuro: Bilateral upper and lower extremity coordination and muscle stretch reflexes are physiologic and symmetric.  Negative Clonus. No loss of sensation is noted.  Gait: Normal.    ASSESSMENT: 58 y.o. year old male with lower back/hip pain, consistent with:     1. Spondylolisthesis of lumbosacral region        2. Myofascial pain syndrome            IMPRESSION: Mr. Smith presents for history of lower back pain.  At the time of his 1st encounter with me, he was having severe lower back pain and hip pain.  Since the time of his initial visit he has started physical therapy and he has developed a robust home exercise program.  He feels his pain has significantly improved, and his function has also improved.  He reports that he is playing golf again and running daily.  History and physical exam are consistent with facetogenic pain and myofascial pain syndrome.  Imaging is consistent with lumbar spondylolisthesis.  He has had significant pain improvement with conservative measures alone.    PLAN:  - I have stressed the importance of physical activity and a home exercise  plan to help with pain and improve health.  - Patient can continue with medications for now since they are providing benefits, using them appropriately, and without side effects.  - I counseled on using care when doing excessive lifting or twisting, as this could cause an exacerbation  - I counseled on maintaining his home exercise program  - he will continue to follow up with healthy back program as scheduled.  - Counseled patient regarding the importance of activity modification and physical therapy.  - RTC as needed      The above plan and management options were discussed at length with patient. Patient is in agreement with the above and verbalized understanding.    Any Thomas  10/31/2023

## 2023-11-10 ENCOUNTER — CLINICAL SUPPORT (OUTPATIENT)
Dept: REHABILITATION | Facility: OTHER | Age: 58
End: 2023-11-10
Attending: STUDENT IN AN ORGANIZED HEALTH CARE EDUCATION/TRAINING PROGRAM
Payer: COMMERCIAL

## 2023-11-10 DIAGNOSIS — M47.816 LUMBAR SPONDYLOSIS: ICD-10-CM

## 2023-11-10 DIAGNOSIS — M43.16 SPONDYLOLISTHESIS OF LUMBAR REGION: ICD-10-CM

## 2023-11-10 DIAGNOSIS — M25.69 DECREASED RANGE OF MOTION OF TRUNK AND BACK: ICD-10-CM

## 2023-11-10 DIAGNOSIS — M51.36 DDD (DEGENERATIVE DISC DISEASE), LUMBAR: ICD-10-CM

## 2023-11-10 DIAGNOSIS — R29.898 DECREASED STRENGTH OF TRUNK AND BACK: Primary | ICD-10-CM

## 2023-11-10 PROCEDURE — 97110 THERAPEUTIC EXERCISES: CPT

## 2023-11-10 PROCEDURE — 97161 PT EVAL LOW COMPLEX 20 MIN: CPT

## 2023-11-10 PROCEDURE — 97112 NEUROMUSCULAR REEDUCATION: CPT

## 2023-11-10 PROCEDURE — 97530 THERAPEUTIC ACTIVITIES: CPT

## 2023-11-15 NOTE — PLAN OF CARE
[facility]  Physical Therapy Lumbar Evaluation      Name: Calvin Smith  Clinic Number: [id number]    Therapy Diagnosis:   Encounter Diagnoses   Name Primary?    DDD (degenerative disc disease), lumbar     Spondylolisthesis of lumbar region     Lumbar spondylosis      Physician: Any Thomas MD    Physician Orders: PT Eval and Treat  Medical Diagnosis from Referral:   M51.36 (ICD-10-CM) - DDD (degenerative disc disease), lumbar   M43.16 (ICD-10-CM) - Spondylolisthesis of lumbar region   M47.816 (ICD-10-CM) - Lumbar spondylosis     Evaluation Date: 11/10/2023  Authorization Period Expiration: 9/11/2024  Plan of Care Expiration: 1/19/2024  Reassessment Due: 12/10/2023  Visit # / Visits authorized: 1/1 (pending)  MedX testing visit 1    Time In: 10:00 am  Time Out: 11:15 am  Total Billable Time: 75 minutes  INSURANCE and OUTCOMES: Fee for Service with FOTO Outcomes 1/3    Precautions: standard    Pattern of pain determined: movement responder/stability     Subjective     Date of onset: 2 months   History of current condition: Calvin reports pain started 25 years ago he had a back injury while serving playing tennis. Around 2 months ago he had an episode of extreme back pain while bending over after getting out of the shower. He had to get transported to the ER because the pain was so bad. The pain is in the bilateral lower back.  A similar episode happened 5 or 6 years ago but not as bad as this time. Standing still for more than 30 minutes is really painful. Aggravating factors include prolonged standing, prolonged sitting, increased activity, cutting the grass, lifting, and tennis. Alleviated with repeated knees to chest, exercise.     Medical History:   Past Medical History:   Diagnosis Date    Anxiety     Colon polyp     Fatty liver     TIA (transient ischemic attack)        Surgical History:   Calvin Smith  has a past surgical history that includes Colonoscopy (11/2019);  "Appendectomy (2005); Epidural steroid injection into lumbar spine (2018); and Injection of anesthetic agent around medial branch nerves innervating lumbar facet joint (2019).    Medications:   Calvin has a current medication list which includes the following prescription(s): aspirin, atorvastatin, fluticasone propionate, ibuprofen, lidocaine, metformin, scopolamine, sertraline, tizanidine, and walker.    Allergies:   Review of patient's allergies indicates:  No Known Allergies     Imaging: MRI LUMBAR SPINE WITHOUT CONTRAST  Impression:     1. Bilateral L5 spondylolysis with grade 1 anterolisthesis of L5 on S1 contributing to moderate to severe bilateral neural foraminal narrowing with suspected impingement of the exiting L5 nerve roots.  2. Additional lumbar spondylosis at L3-L4 and L4-L5 as detailed above.    Prior Therapy: currently in PT at [facility]  Prior Treatment: Epidural 5-6 years ago   Social History: single story home,  lives with their spouse  Occupation: Manager [employer], mostly seated   Leisure: Golf, Tennis      Prior Level of Function: independent   Current Level of Function: increased pain and decreased tolerance to prolonged standing, prolonged sitting, increased activity, cutting the grass, lifting, and tennis  DME owned/used: no  Gym Membership: Yes    Pain:  Current 0/10, worst 7/10, best 0/10   Location: bilateral back   Description: Aching, Burning, and Sharp  Aggravating Factors: The pain is in the bilateral lower back.   Easing Factors: repeated knees to chest, exercise  Disturbed Sleep: Yes, but not bc of back    Pattern of pain questions:  1.  Where is your pain the worst? Bilateral low back   2.  Is your pain constant or intermittent? Intermittent   3.  Does bending forward make your typical pain worse? No  4.  Since the start of your back pain, has there been a change in your bowel or bladder? No  5.  What can't you do now that you use to be able to do? No    Pts goals: "I want " "to learn how to strengthen my back"    Red Flag Screening:   Cough/Sneeze Strain: (--)  Bladder/Bowel: (--)  Falls: (--)  Night pain: (--)  Unexplained weight loss: (--)  General health: "Excellent"    Objective      Postural examination/scapula alignment: Rounded shoulder, Slouched posture, Increased kyphosis, and Decreased lordosis  Joint integrity: Hard end feel  Skin integrity:WNL   Edema: None  Correction of posture: better with lumbar roll  Sitting: poor  Standing: poor    MOVEMENT LOSS - Lumbar   Norms ROM Loss Initial   Flexion Fingers touch toes, sacral angle >/= 70 deg, uniform spinal curvature, posterior weight shift  within functional limits   Extension ASIS surpasses toes, spine of scapulae surpasses heels, uniform spinal curve moderate loss   Side glide Right  moderate loss   Side glide Left  moderate loss   Rotation Right PT observes contralateral shoulder moderate loss   Rotation Left PT observes contralateral shoulder moderate loss     Lower Extremity Strength  Right LE  Left LE    Hip flexion: 4+/5 Hip flexion: 4+/5   Hip extension:  4-/5 Hip extension: 4-/5   Hip abduction: 3+/5 Hip abduction: 3+/5   Hip adduction:  4-/5 Hip adduction:  4-/5   Hip External Rotation 4/5 Hip External Rotation 4/5   Hip Internal rotation   4/5 Hip Internal rotation 4/5   Knee Flexion 5/5 Knee Flexion 5/5   Knee Extension 5/5 Knee Extension 5/5   Ankle dorsiflexion: 5/5 Ankle dorsiflexion: 5/5   Ankle plantarflexion: 5/5 Ankle plantarflexion: 5/5     GAIT:  Assistive Device used: none  Level of Assistance: independent  Patient displays the following gait deviations: no gait deviations observed.     Special Tests:   Test Name  Test Result   Prone Instability Test (+)   SI Joint Provocation Test (--)   Straight Leg Raise (--)   Neural Tension Test (--)   Crossed Straight Leg Raise (--)   Walking on toes Able   Walking on heels  Able     NEUROLOGICAL SCREENING:     Sensory deficits: intact to light touch     Reflexes:    " Left Right   Patella Tendon 2+ 2+   Achilles Tendon 1+ 1+   Babinski  (--) (--)   Clonus (--) (--)     REPEATED TEST MOVEMENTS:    Baseline symptoms:  Repeated Flexion in Standing no effect   Repeated Extension in Standing no effect   Repeated Flexion in lying no effect   Repeated Extension in lying  no effect       STATIC TESTS and other movements:   Prone lie no effect   Prone lie on elbows no effect   Sitting slouched  no effect   Sitting erect no effect   Standing slouched no effect   Standing erect  no effect   Lying prone in extension  no effect   Long sitting   no effect   Sustained flexion no effect   Sustained prone using mat no effect     Lumbar testing Visit 1   Lumbar Isometric Testing on Med X equipment: Testing administered by PT    Test Initial Midpoint Final   Date 11/10/2023     ROM 3-48 deg     Max Peak Torque 130      Min Peak Torque 60      Flex/Ext Ratio 2.1:1     % below normative data 60     % gain from initial test Not available visit 1         OUTCOMES SELECTION:   Intake Outcome Measure for FOTO lumbar Survey    Therapist reviewed FOTO scores for Calvin Smith on 11/10/2023.   FOTO documents entered into [employer] - see Media section.    Intake Score: 62% functional ability  Goal Score: 68% functional ability          Treatment     Total Treatment time separate from Evaluation: 45 minutes    Calvin received therapeutic exercises to develop/improve posture, lumbar ROM, strength, and muscular endurance for 15 minutes including the following exercises:     EIL x10  DKTC x10  Cat/cow x10  Side plank with hip abd x10 ea  SOC x10  Bridge with LE extension x10  Bird dog x10      Written Home Exercises Provided: yes.    HEP AS FOLLOWS:  EIL  DKTC  Cat/cow  Side plank with hip abd  SOC  Bridge with LE extension  Bird dog    Exercises were reviewed and Calvin was able to demonstrate them prior to the end of the session. Calvin demonstrated good  understanding of the education provided.     See EMR under Patient  Instructions for exercises provided 11/10/2023.    MedX Testing:   Patient received neuromuscular education to engage spinal musculature correctly for motor control and engagement of musculature for 15 minutes including the MedX exercise component and practice and standard testing. MedX dynamic exercise and baseline isometric test performed with instructions to guide the patient safely through the testing procedure. Patient instructed to perform isometric test correctly and safely while building to an optimal force with a pain-free effort. Patient also instructed that they should feel support/pressure from MedX restraints but no pain/discomfort. Patient demonstrated appropriate understanding of information.         11/10/2023    11:00 AM   [facility] Therapy - Short   Visit Number 1   VAS Pain Rating 0   Lumbar Stretches - Slouch 10   Extension in Lying 10   Extension in Standing 10   Flexion in Lying 10   Lumbar Extension - Seat Pad 0   Femur Restraint 5   Top Dead Center 24   Counterweight 231   Lumbar Flexion 48   Lumbar Extension 3   Lumbar Peak Torque 130 ft. lbs.   Lumbar Weight 60 lbs   Repetitions 5         Therapeutic Education/Activity provided for 10 minutes:   - Patient was given an [facility] Visit 1 handout which discusses the following:  - what to expect in therapy  - an overview of the program, including health coaching and wellness  - importance of spinal hygiene, proper posture, lifting mechanics, sleep quality, and nutrition/hydration   - Robinson roll trialed, recommended, and purchase information was provided.  - Patient received a handout regarding anticipated muscular soreness following the isometric test and strategies for management were reviewed with patient including stretching, using ice and scheduled rest.   - Patient received verbal education on the following:   - Healthy Back program   - purpose of the isometric test  - safe progression of lumbar strengthening, wellness  approach, and systemic strengthening.   - safe usage of MedX machine and testing protocols.    Calvin received cold pack for 5 minutes to low back in Z-lie.    Assessment     Calvin is a 58 y.o. male referred to [facility] with a medical diagnosis of M51.36 (ICD-10-CM) - DDD (degenerative disc disease), lumbar, M43.16 (ICD-10-CM) - Spondylolisthesis of lumbar region, and M47.816 (ICD-10-CM) - Lumbar spondylosis. Pt presents with signs and symptoms consistent with diagnosis including decreased range of motion of trunk and back, weakness of trunk and back, poor posture, bilateral lower extremity weakness, decreased joint mobility, decreased soft tissue flexibility and mobility and decreased functional mobility tolerance. These deficits are limiting patient in full participation in ADL's and leisure activities such as prolonged standing, prolonged sitting, increased activity, cutting the grass, lifting, and tennis. Pt is 60% below normative values with medx lumbar isometric strength testing. No change in symptoms with repeated motions testing, indicating no pattern of pain noted.     Pain Pattern: movement responder/stability       Pt prognosis is Good.     Pt will benefit from skilled outpatient Physical Therapy to address the deficits stated above and in the chart below, to provide pt/family education, and to maximize pt's level of independence. Based on the above history and physical examination an active physical therapy program is recommended.      Plan of care discussed with patient: Yes  Pt's spiritual, cultural and educational needs considered and patient is agreeable to the plan of care and goals as stated below:     Anticipated Barriers for therapy: none    PT Evaluation Completed? Yes    Medical necessity is demonstrated by the following problem list:    History  Co-morbidities and personal factors that may impact the plan of care [] LOW: no personal factors / co-morbidities  [x] MODERATE: 1-2 personal  factors / co-morbidities  [] HIGH: 3+ personal factors / co-morbidities    Moderate / High Support Documentation:   Co-morbidities affecting plan of care: anxiety, hx of TIA    Personal Factors:   no deficits     Examination  Body Structures and Functions, activity limitations and participation restrictions that may impact the plan of care [x] LOW: addressing 1-2 elements  [] MODERATE: 3+ elements  [] HIGH: 4+ elements (please support below)    Moderate / High Support Documentation:     Clinical Presentation [x] LOW: stable  [] MODERATE: Evolving  [] HIGH: Unstable     Decision Making/ Complexity Score: low         GOALS: Pt is in agreement with the following goals.    Short term goals:  6 weeks or 10 visits   - Pt will demonstrate increased lumbar MedX ROM by at least 6 degrees from the initial ROM value with improvements noted in functional ROM and ability to perform ADLs. Appropriate and Ongoing  - Pt will demonstrate increased MedX average isometric strength value by 15% from initial test resulting in improved ability to perform bending, lifting, and carrying activities safely, confidently. Appropriate and Ongoing  - Pt will report a reduction in worst pain score by 1-2 points for improved tolerance for tennis. Appropriate and Ongoing  - Pt able to perform HEP correctly with minimal cueing or supervision from therapist to encourage independent management of symptoms. Appropriate and Ongoing    Long term goals: 10 weeks or 20 visits   - Pt will demonstrate increased lumbar MedX ROM by at least 9 degrees from initial ROM value, resulting in improved ability to perform functional forward bending while standing and sitting. Appropriate and Ongoing  - Pt will demonstrate increased MedX average isometric strength value by 30% from initial test resulting in improved ability to perform bending, lifting, and carrying activities safely and confidently. Appropriate and Ongoing  - Pt to demonstrate ability to independently  control and reduce their pain through posture positioning and mechanical movements throughout a typical day. Appropriate and Ongoing  - Pt will demonstrate reduced pain and improved functional outcomes as reported on the FOTO by reaching an intake score of >/= 66% functional ability in order to demonstrate subjective improvement in patient's condition. . Appropriate and Ongoing  - Pt will demonstrate independence with the HEP at discharge. Appropriate and Ongoing  - Pt will be able to lift 20lb box from floor to plinth and back 10 times with appropriate form and no increased pain. Appropriate and Ongoing    Plan     Outpatient physical therapy 2x week for 10 weeks or 20 visits to include the following:   - Patient education  - Therapeutic exercise  - Manual therapy  - Performance testing   - Neuromuscular Re-education  - Therapeutic activity   - Modalities    Pt may be seen by PTA as part of the rehabilitation team.     Therapist: Arslan Harris, PT  11/10/2023

## 2023-11-20 ENCOUNTER — CLINICAL SUPPORT (OUTPATIENT)
Dept: REHABILITATION | Facility: OTHER | Age: 58
End: 2023-11-20
Payer: COMMERCIAL

## 2023-11-20 DIAGNOSIS — R29.898 DECREASED STRENGTH OF TRUNK AND BACK: Primary | ICD-10-CM

## 2023-11-20 DIAGNOSIS — M25.69 DECREASED RANGE OF MOTION OF TRUNK AND BACK: ICD-10-CM

## 2023-11-20 PROCEDURE — 97112 NEUROMUSCULAR REEDUCATION: CPT

## 2023-11-20 PROCEDURE — 97110 THERAPEUTIC EXERCISES: CPT

## 2023-11-20 NOTE — PROGRESS NOTES
"[facility]  Physical Therapy Treatment Note     Name: Calvin Smith  Clinic Number: [id number]    Therapy Diagnosis:   Encounter Diagnoses   Name Primary?    Decreased strength of trunk and back Yes    Decreased range of motion of trunk and back      Physician: Any Thomas MD    Visit Date: 11/20/2023    Physician: Any Thomas MD     Physician Orders: PT Eval and Treat  Medical Diagnosis from Referral:   M51.36 (ICD-10-CM) - DDD (degenerative disc disease), lumbar   M43.16 (ICD-10-CM) - Spondylolisthesis of lumbar region   M47.816 (ICD-10-CM) - Lumbar spondylosis      Evaluation Date: 11/10/2023  Authorization Period Expiration: 9/11/2024  Plan of Care Expiration: 1/19/2024  Reassessment Due: 12/10/2023  Visit # / Visits authorized: 2/20  MedX testing visit 1     Time In: 8:00 am  Time Out: 9:00 am  Total Billable Time: 55 minutes  INSURANCE and OUTCOMES: Fee for Service with FOTO Outcomes 1/3     Precautions: standard     Pattern of pain determined: movement responder/stability     Subjective     Calvin Smith reports able to do exercises without problems.Played golf yesterday and back started to tighten up on him shelter through    Patient reports tolerating previous visit well  Patient reports their pain to be  0 /10 on a 0-10 scale with 0 being no pain and 10 being the worst pain imaginable.  Pain Location: bilateral LB     Occupation: Manager [employer], mostly seated   Leisure: Golf, Tennis      Pt goals: "I want to learn how to strengthen my back"     Objective      Lumbar  Isometric Testing on Med X equipment: Testing administered by PT    Lumbar Isometric Testing on Med X equipment: Testing administered by PT     Test Initial Midpoint Final   Date 11/10/2023       ROM 3-48 deg       Max Peak Torque 130        Min Peak Torque 60        Flex/Ext Ratio 2.1:1       % below normative data 60       % gain from initial test Not available visit 1        " "      Outcomes:  Intake Score: 62%  Visit 6 Score:   Visit 10 Score:   Discharge Score:  Goal Score: 68%     Treatment     Calvin received the treatments listed below:      Medical MedX Treatment as follows:  MedX exercise started day 2:  Patient received neuromuscular education for 10 minutes via participation on the Medical MedX Machine. Therapist assisted patient in isolating and engaging spinal stabilization musculature in order to improve functional ability and postural control. Patient performed exercise with therapist guidance in order to accurately use pacer function, avoid valsalva, and optimally exert effort within a safe and effective range via the Abe Exertion Rating Scale. Patient instructed to perform at a midrange of exertion and to complete 15-20 repetitions within appropriate split time, with proper technique, and while maintaining safety.          11/20/2023     8:15 AM   [facility] Therapy - Short   Visit Number 2   VAS Pain Rating 0   Treadmill Time (in min.) 5 min   Lumbar Stretches - Slouch 15   Extension in Lying 20   Flexion in Lying 10   Lumbar Weight 65 lbs   Repetitions 18   Rating of Perceived Exertion 3         Calvin participated in neuromuscular re-education activities to improve balance, coordination, proprioception, motor control and/or posture for 15 minutes. The following activities were included:    PPT 2x10x5"  SOC 2x10x5"  Cat cow x10  Bird dog x10     Calvin participated in therapeutic exercises to develop strength, endurance, ROM, flexibility, posture, and core stabilization for 30 minutes including:    Treadmill x5'    DKTC 10x5"  EIL 2x10  Bridge + kick x10 ea  Side plank + abd x10 ea    Peripheral muscle strengthening which included one set of 15-20 repetitions at a slow and controlled 10-13 second per rep pace focused on strengthening supporting musculature in order to improve body mechanics and functional mobility. Patient and therapist focused on proper form during treatment to " ensure optimal strengthening of each targeted muscle group.  Machines utilized included:Torso rotation, Leg Ext, Leg Curl, Chest Press, and Rowing  To be added next visit:Triceps, Biceps, Hip Abd, Hip Add, and Leg Press      Calvin participated in dynamic functional therapeutic activities to improve functional performance and simulate household and community activities for -  minutes. The following activities were included:      Calvin received manual therapy techniques for -  minutes. The following activities were included:      Pt given cold pack for 5 minutes to low back in Z-lie.    Patient Education and Home Exercises     Home exercises include:  EIL, DKTC,cat cow, SOC, bridge, bird dog  Cardio program (V5): -  Lifting education (V11): -  Posture/Lumbar roll: yes  Fridge Magnet Discharge handout (date given): -  Equipment at home/gym membership: yes    Education provided:   - PT role and POC  - HEP    Written Home Exercises Provided: Patient instructed to cont prior HEP.  Exercises were reviewed and Calvin was able to demonstrate them prior to the end of the session.  Calvin demonstrated good  understanding of the education provided.     See EMR under Patient Instructions for exercises provided prior visit.    Assessment     Calvin presents to second healthy back visit reporting no pain, was able to demo HEP with Mod VC for form. Pt was able to tolerate Medical MedX machine well as follows:  MedX exercise started  and patient tolerated  neuro reeducation training, strengthening, and endurance training on the lumbar MedX at 50% of max peak torque according to the initial visit isometric test. Pt was able to complete 18 reps, with 3/10 RPE.   Pt was also able to complete half of the peripheral strengthening exercises without increased discomfort and will complete the complete circuit next visit as tolerated.    Patient is making good progress towards established goals.  Pt will continue to benefit from skilled outpatient  physical therapy to address the deficits stated in the impairment chart, provide pt/family education and to maximize pt's level of independence in the home and community environment.     Anticipated Barriers for therapy: none  Pt's spiritual, cultural and educational needs considered and pt agreeable to plan of care and goals as stated below:     GOALS: Pt is in agreement with the following goals.     Short term goals:  6 weeks or 10 visits   - Pt will demonstrate increased lumbar MedX ROM by at least 6 degrees from the initial ROM value with improvements noted in functional ROM and ability to perform ADLs. Appropriate and Ongoing  - Pt will demonstrate increased MedX average isometric strength value by 15% from initial test resulting in improved ability to perform bending, lifting, and carrying activities safely, confidently. Appropriate and Ongoing  - Pt will report a reduction in worst pain score by 1-2 points for improved tolerance for tennis. Appropriate and Ongoing  - Pt able to perform HEP correctly with minimal cueing or supervision from therapist to encourage independent management of symptoms. Appropriate and Ongoing     Long term goals: 10 weeks or 20 visits   - Pt will demonstrate increased lumbar MedX ROM by at least 9 degrees from initial ROM value, resulting in improved ability to perform functional forward bending while standing and sitting. Appropriate and Ongoing  - Pt will demonstrate increased MedX average isometric strength value by 30% from initial test resulting in improved ability to perform bending, lifting, and carrying activities safely and confidently. Appropriate and Ongoing  - Pt to demonstrate ability to independently control and reduce their pain through posture positioning and mechanical movements throughout a typical day. Appropriate and Ongoing  - Pt will demonstrate reduced pain and improved functional outcomes as reported on the FOTO by reaching an intake score of >/= 66%  functional ability in order to demonstrate subjective improvement in patient's condition. . Appropriate and Ongoing  - Pt will demonstrate independence with the HEP at discharge. Appropriate and Ongoing  - Pt will be able to lift 20lb box from floor to plinth and back 10 times with appropriate form and no increased pain. Appropriate and Ongoing    Plan     Continue with established Plan of Care towards established PT goals.     Therapist: Arslan Harris, PT  11/20/2023

## 2023-11-28 ENCOUNTER — CLINICAL SUPPORT (OUTPATIENT)
Dept: REHABILITATION | Facility: OTHER | Age: 58
End: 2023-11-28
Payer: COMMERCIAL

## 2023-11-28 DIAGNOSIS — R29.898 DECREASED STRENGTH OF TRUNK AND BACK: Primary | ICD-10-CM

## 2023-11-28 DIAGNOSIS — M25.69 DECREASED RANGE OF MOTION OF TRUNK AND BACK: ICD-10-CM

## 2023-11-28 PROCEDURE — 97110 THERAPEUTIC EXERCISES: CPT

## 2023-11-28 PROCEDURE — 97112 NEUROMUSCULAR REEDUCATION: CPT

## 2023-11-28 NOTE — PROGRESS NOTES
"[facility]  Physical Therapy Treatment Note     Name: Calvin Smith  Clinic Number: [id number]    Therapy Diagnosis:   Encounter Diagnoses   Name Primary?    Decreased strength of trunk and back Yes    Decreased range of motion of trunk and back        Physician: Any Thomas MD    Visit Date: 11/28/2023     Physician Orders: PT Eval and Treat  Medical Diagnosis from Referral:   M51.36 (ICD-10-CM) - DDD (degenerative disc disease), lumbar   M43.16 (ICD-10-CM) - Spondylolisthesis of lumbar region   M47.816 (ICD-10-CM) - Lumbar spondylosis      Evaluation Date: 11/10/2023  Authorization Period Expiration: 9/11/2024  Plan of Care Expiration: 1/19/2024  Reassessment Due: 12/10/2023  Visit # / Visits authorized: 3/20  MedX testing visit 1     Time In: 3:15 pm  Time Out: 4:15 pm  Total Billable Time: 55 minutes  INSURANCE and OUTCOMES: Fee for Service with FOTO Outcomes 1/3     Precautions: standard     Pattern of pain determined: movement responder/stability     Subjective     Calvin Smith reports he has been exercising everyday and can tell it is helping. Still not able to finish a round of golf without back hurting. No pain right now    Patient reports tolerating previous visit well  Patient reports their pain to be  0 /10 on a 0-10 scale with 0 being no pain and 10 being the worst pain imaginable.  Pain Location: bilateral LB     Occupation: Manager [employer], mostly seated   Leisure: Golf, Tennis      Pt goals: "I want to learn how to strengthen my back"     Objective      Lumbar  Isometric Testing on Med X equipment: Testing administered by PT    Lumbar Isometric Testing on Med X equipment: Testing administered by PT     Test Initial Midpoint Final   Date 11/10/2023       ROM 3-48 deg       Max Peak Torque 130        Min Peak Torque 60        Flex/Ext Ratio 2.1:1       % below normative data 60       % gain from initial test Not available visit 1        " "      Outcomes:  Intake Score: 62%  Visit 6 Score:   Visit 10 Score:   Discharge Score:  Goal Score: 68%     Treatment     Calvin received the treatments listed below:      Medical MedX Treatment as follows:  MedX exercise started day 2:  Patient received neuromuscular education for 10 minutes via participation on the Medical MedX Machine. Therapist assisted patient in isolating and engaging spinal stabilization musculature in order to improve functional ability and postural control. Patient performed exercise with therapist guidance in order to accurately use pacer function, avoid valsalva, and optimally exert effort within a safe and effective range via the Abe Exertion Rating Scale. Patient instructed to perform at a midrange of exertion and to complete 15-20 repetitions within appropriate split time, with proper technique, and while maintaining safety.          11/28/2023     3:43 PM   [facility] - Short   Visit Number 3   VAS Pain Rating 0   Time 5   Extension in Lying 20   Flexion in Lying 10   Lumbar Weight 65 lbs   Repetitions 20   Rating of Perceived Exertion 3       Calvin participated in neuromuscular re-education activities to improve balance, coordination, proprioception, motor control and/or posture for 15 minutes. The following activities were included:    PPT 2x10x5"  SOC 2x10x5"  Cat cow x10  Bird dog x10   +paloff press in kneeling x10 ea, w/ rotation x10 ea    Calvin participated in therapeutic exercises to develop strength, endurance, ROM, flexibility, posture, and core stabilization for 30 minutes including:    Treadmill x5'    DKTC 10x5"  EIL 2x10  Bridge + kick x10 ea  Side plank + abd x10 ea  +dead bugs 3x5 ea    Peripheral muscle strengthening which included one set of 15-20 repetitions at a slow and controlled 10-13 second per rep pace focused on strengthening supporting musculature in order to improve body mechanics and functional mobility. Patient and therapist focused on proper form " during treatment to ensure optimal strengthening of each targeted muscle group.  Machines utilized included:Torso rotation, Leg Ext, Leg Curl, Chest Press, and Rowing  To be added next visit:Triceps, Biceps, Hip Abd, Hip Add, and Leg Press      Calvin participated in dynamic functional therapeutic activities to improve functional performance and simulate household and community activities for -  minutes. The following activities were included:      Calvin received manual therapy techniques for -  minutes. The following activities were included:      Pt given cold pack for 5 minutes to low back in Z-lie.    Patient Education and Home Exercises     Home exercises include:  EIL, DKTC,cat cow, SOC, bridge, bird dog  Cardio program (V5): -  Lifting education (V11): -  Posture/Lumbar roll: yes  [employer] Magnet Discharge handout (date given): -  Equipment at home/gym membership: yes    Education provided:   - PT role and POC  - HEP    Written Home Exercises Provided: Patient instructed to cont prior HEP.  Exercises were reviewed and Calvin was able to demonstrate them prior to the end of the session.  Calvin demonstrated good  understanding of the education provided.     See EMR under Patient Instructions for exercises provided prior visit.    Assessment     Calvin presents today without complaints of pain. Again reviewed HEP with good form and min cueing required. Progressed core strengthening and stability exercises with good tolerance and no adverse effects. Given print out of dead bugs to complete at home. Medx resistance maintained at 65 ft/lbs and pt was able to complete 20 reps with 3/10 RPE. Progress 10% next visit.     Patient is making good progress towards established goals.  Pt will continue to benefit from skilled outpatient physical therapy to address the deficits stated in the impairment chart, provide pt/family education and to maximize pt's level of independence in the home and community environment.     Anticipated  Barriers for therapy: none  Pt's spiritual, cultural and educational needs considered and pt agreeable to plan of care and goals as stated below:     GOALS: Pt is in agreement with the following goals.     Short term goals:  6 weeks or 10 visits   - Pt will demonstrate increased lumbar MedX ROM by at least 6 degrees from the initial ROM value with improvements noted in functional ROM and ability to perform ADLs. Appropriate and Ongoing  - Pt will demonstrate increased MedX average isometric strength value by 15% from initial test resulting in improved ability to perform bending, lifting, and carrying activities safely, confidently. Appropriate and Ongoing  - Pt will report a reduction in worst pain score by 1-2 points for improved tolerance for tennis. Appropriate and Ongoing  - Pt able to perform HEP correctly with minimal cueing or supervision from therapist to encourage independent management of symptoms. Appropriate and Ongoing     Long term goals: 10 weeks or 20 visits   - Pt will demonstrate increased lumbar MedX ROM by at least 9 degrees from initial ROM value, resulting in improved ability to perform functional forward bending while standing and sitting. Appropriate and Ongoing  - Pt will demonstrate increased MedX average isometric strength value by 30% from initial test resulting in improved ability to perform bending, lifting, and carrying activities safely and confidently. Appropriate and Ongoing  - Pt to demonstrate ability to independently control and reduce their pain through posture positioning and mechanical movements throughout a typical day. Appropriate and Ongoing  - Pt will demonstrate reduced pain and improved functional outcomes as reported on the FOTO by reaching an intake score of >/= 66% functional ability in order to demonstrate subjective improvement in patient's condition. . Appropriate and Ongoing  - Pt will demonstrate independence with the HEP at discharge. Appropriate and Ongoing  -  Pt will be able to lift 20lb box from floor to plinth and back 10 times with appropriate form and no increased pain. Appropriate and Ongoing    Plan     Continue with established Plan of Care towards established PT goals.     Therapist: Arslan Harris, PT  11/28/2023

## 2023-12-01 ENCOUNTER — CLINICAL SUPPORT (OUTPATIENT)
Dept: REHABILITATION | Facility: OTHER | Age: 58
End: 2023-12-01
Payer: COMMERCIAL

## 2023-12-01 DIAGNOSIS — R29.898 DECREASED STRENGTH OF TRUNK AND BACK: Primary | ICD-10-CM

## 2023-12-01 DIAGNOSIS — M25.69 DECREASED RANGE OF MOTION OF TRUNK AND BACK: ICD-10-CM

## 2023-12-01 PROCEDURE — 97112 NEUROMUSCULAR REEDUCATION: CPT

## 2023-12-01 PROCEDURE — 97110 THERAPEUTIC EXERCISES: CPT

## 2023-12-01 NOTE — PROGRESS NOTES
"[facility]  Physical Therapy Treatment Note     Name: Calvin Smith  Clinic Number: [id number]    Therapy Diagnosis:   Encounter Diagnoses   Name Primary?    Decreased strength of trunk and back Yes    Decreased range of motion of trunk and back        Physician: Any Thomas MD    Visit Date: 12/1/2023     Physician Orders: PT Eval and Treat  Medical Diagnosis from Referral:   M51.36 (ICD-10-CM) - DDD (degenerative disc disease), lumbar   M43.16 (ICD-10-CM) - Spondylolisthesis of lumbar region   M47.816 (ICD-10-CM) - Lumbar spondylosis      Evaluation Date: 11/10/2023  Authorization Period Expiration: 9/11/2024  Plan of Care Expiration: 1/19/2024  Reassessment Due: 12/10/2023  Visit # / Visits authorized: 4/20  MedX testing visit 1     Time In: 1:25 pm  Time Out: 2:25 pm  Total Billable Time: 55 minutes  INSURANCE and OUTCOMES: Fee for Service with FOTO Outcomes 1/3     Precautions: standard     Pattern of pain determined: movement responder/stability     Subjective     Calvin Smith reports he has had a very busy week due to having to attend a conference and be on his feet all day for 3 days in a row. After about an hour of standing his back would be achy and sore.     Patient reports tolerating previous visit well  Patient reports their pain to be  0 /10 on a 0-10 scale with 0 being no pain and 10 being the worst pain imaginable.  Pain Location: bilateral LB     Occupation: Manager [employer], mostly seated   Leisure: Golf, Tennis      Pt goals: "I want to learn how to strengthen my back"     Objective      Lumbar  Isometric Testing on Med X equipment: Testing administered by PT    Lumbar Isometric Testing on Med X equipment: Testing administered by PT     Test Initial Midpoint Final   Date 11/10/2023       ROM 3-48 deg       Max Peak Torque 130        Min Peak Torque 60        Flex/Ext Ratio 2.1:1       % below normative data 60       % gain from initial test Not " "available visit 1            Outcomes:  Intake Score: 62%  Visit 6 Score:   Visit 10 Score:   Discharge Score:  Goal Score: 68%     Treatment     Calvin received the treatments listed below:      Medical MedX Treatment as follows:  MedX exercise started day 2:  Patient received neuromuscular education for 10 minutes via participation on the Medical MedX Machine. Therapist assisted patient in isolating and engaging spinal stabilization musculature in order to improve functional ability and postural control. Patient performed exercise with therapist guidance in order to accurately use pacer function, avoid valsalva, and optimally exert effort within a safe and effective range via the Abe Exertion Rating Scale. Patient instructed to perform at a midrange of exertion and to complete 15-20 repetitions within appropriate split time, with proper technique, and while maintaining safety.          12/1/2023     2:01 PM   [facility] - Short   Visit Number 4   VAS Pain Rating 0   Treadmill Time (in min.) 5 min   Extension in Lying 20   Lumbar Weight 71 lbs   Repetitions 15   Rating of Perceived Exertion 3     Calvin participated in neuromuscular re-education activities to improve balance, coordination, proprioception, motor control and/or posture for 20 minutes. The following activities were included:    PPT 10x5"  Cat cow x10  Bird dog x10   +Straight arm plank cone taps 2x10 ea hand  +Supermans 2x10x3"  +Suitcase carry 20# KB 2x1' each hand    NP:  paloff press in kneeling x10 ea, w/ rotation x10 ea  SOC 2x10x5"    Calvin participated in therapeutic exercises to develop strength, endurance, ROM, flexibility, posture, and core stabilization for 25 minutes including:    Treadmill x5'    EIL 2x10  Open  Bridge on ball 10x3"  dead bugs 3x5 ea    NP:   Side plank + abd x10 ea  DKTC 10x5"    Peripheral muscle strengthening which included one set of 15-20 repetitions at a slow and controlled 10-13 second per rep pace focused on " strengthening supporting musculature in order to improve body mechanics and functional mobility. Patient and therapist focused on proper form during treatment to ensure optimal strengthening of each targeted muscle group.  Machines utilized included:Torso rotation, Leg Ext, Leg Curl, Chest Press, and Rowing  To be added next visit:Triceps, Biceps, Hip Abd, Hip Add, and Leg Press      Calvin participated in dynamic functional therapeutic activities to improve functional performance and simulate household and community activities for -  minutes. The following activities were included:      Calvin received manual therapy techniques for -  minutes. The following activities were included:      Pt given cold pack for 5 minutes to low back in Z-lie.    Patient Education and Home Exercises     Home exercises include:  EIL, DKTC,cat cow, SOC, bridge, bird dog  Cardio program (V5): -  Lifting education (V11): -  Posture/Lumbar roll: yes  [facility] Discharge handout (date given): -  Equipment at home/gym membership: yes    Education provided:   - PT role and POC  - HEP    Written Home Exercises Provided: Patient instructed to cont prior HEP.  Exercises were reviewed and Calvin was able to demonstrate them prior to the end of the session.  Calvin demonstrated good  understanding of the education provided.     See EMR under Patient Instructions for exercises provided prior visit.    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 Progressed dynamic core and lumbar strengthening and stability exercises with good tolerance and no adverse effects. Pt given print out of supermans to be completed at home. Medx resistance progressed to 71 ft/lbs and pt was able to complete 15 reps with 3/10 RPE. Continue to progress per HB protocol.     Patient is making good progress towards established goals.  Pt will continue to benefit from skilled  outpatient physical therapy to address the deficits stated in the impairment chart, provide pt/family education and to maximize pt's level of independence in the home and community environment.     Anticipated Barriers for therapy: none  Pt's spiritual, cultural and educational needs considered and pt agreeable to plan of care and goals as stated below:     GOALS: Pt is in agreement with the following goals.     Short term goals:  6 weeks or 10 visits   - Pt will demonstrate increased lumbar MedX ROM by at least 6 degrees from the initial ROM value with improvements noted in functional ROM and ability to perform ADLs. Appropriate and Ongoing  - Pt will demonstrate increased MedX average isometric strength value by 15% from initial test resulting in improved ability to perform bending, lifting, and carrying activities safely, confidently. Appropriate and Ongoing  - Pt will report a reduction in worst pain score by 1-2 points for improved tolerance for tennis. Appropriate and Ongoing  - Pt able to perform HEP correctly with minimal cueing or supervision from therapist to encourage independent management of symptoms. Appropriate and Ongoing     Long term goals: 10 weeks or 20 visits   - Pt will demonstrate increased lumbar MedX ROM by at least 9 degrees from initial ROM value, resulting in improved ability to perform functional forward bending while standing and sitting. Appropriate and Ongoing  - Pt will demonstrate increased MedX average isometric strength value by 30% from initial test resulting in improved ability to perform bending, lifting, and carrying activities safely and confidently. Appropriate and Ongoing  - Pt to demonstrate ability to independently control and reduce their pain through posture positioning and mechanical movements throughout a typical day. Appropriate and Ongoing  - Pt will demonstrate reduced pain and improved functional outcomes as reported on the FOTO by reaching an intake score of >/=  66% functional ability in order to demonstrate subjective improvement in patient's condition. . Appropriate and Ongoing  - Pt will demonstrate independence with the HEP at discharge. Appropriate and Ongoing  - Pt will be able to lift 20lb box from floor to plinth and back 10 times with appropriate form and no increased pain. Appropriate and Ongoing    Plan     Continue with established Plan of Care towards established PT goals.     Therapist: Arslan Harris, PT  12/1/2023

## 2023-12-06 ENCOUNTER — CLINICAL SUPPORT (OUTPATIENT)
Dept: REHABILITATION | Facility: OTHER | Age: 58
End: 2023-12-06
Payer: COMMERCIAL

## 2023-12-06 DIAGNOSIS — R29.898 DECREASED STRENGTH OF TRUNK AND BACK: Primary | ICD-10-CM

## 2023-12-06 DIAGNOSIS — M25.69 DECREASED RANGE OF MOTION OF TRUNK AND BACK: ICD-10-CM

## 2023-12-06 PROCEDURE — 97112 NEUROMUSCULAR REEDUCATION: CPT | Mod: CQ

## 2023-12-06 PROCEDURE — 97110 THERAPEUTIC EXERCISES: CPT | Mod: CQ

## 2023-12-06 NOTE — PROGRESS NOTES
"[facility]  Physical Therapy Treatment Note     Name: Calvin Smith  Clinic Number: [id number]    Therapy Diagnosis:   Encounter Diagnoses   Name Primary?    Decreased strength of trunk and back Yes    Decreased range of motion of trunk and back          Physician: Any Thomas MD    Visit Date: 12/6/2023     Physician Orders: PT Eval and Treat  Medical Diagnosis from Referral:   M51.36 (ICD-10-CM) - DDD (degenerative disc disease), lumbar   M43.16 (ICD-10-CM) - Spondylolisthesis of lumbar region   M47.816 (ICD-10-CM) - Lumbar spondylosis      Evaluation Date: 11/10/2023  Authorization Period Expiration: 9/11/2024  Plan of Care Expiration: 1/19/2024  Reassessment Due: 12/10/2023  Visit # / Visits authorized: 5/20  MedX testing visit 1     Time In: 2:25 pm  Time Out: 3:20 pm  Total Billable Time: 55 minutes  INSURANCE and OUTCOMES: Fee for Service with FOTO Outcomes 1/3     Precautions: standard     Pattern of pain determined: movement responder/stability     Subjective   Calvin Smith reports he did an hour of stretching/exercising before he got here and compliant with HEP. Going to the gyn regularly.       Patient reports tolerating previous visit well  Patient reports their pain to be  0 /10 on a 0-10 scale with 0 being no pain and 10 being the worst pain imaginable.  Pain Location: bilateral LB     Occupation: Manager [email or web address], mostly seated   Leisure: Golf, Tennis      Pt goals: "I want to learn how to strengthen my back"   Objective      Lumbar  Isometric Testing on Med X equipment: Testing administered by PT    Lumbar Isometric Testing on Med X equipment: Testing administered by PT     Test Initial Midpoint Final   Date 11/10/2023       ROM 3-48 deg       Max Peak Torque 130        Min Peak Torque 60        Flex/Ext Ratio 2.1:1       % below normative data 60       % gain from initial test Not available visit 1            Outcomes:  Intake Score: 62%  Visit 6 Score: " "  Visit 10 Score:   Discharge Score:  Goal Score: 68%     Treatment     Calvin received the treatments listed below:      Calvin participated in neuromuscular re-education activities to improve balance, coordination, proprioception, motor control and/or posture for 40 minutes. The following activities were included:        12/6/2023     2:31 PM   [facility] - Short   Visit Number 5   VAS Pain Rating 0   Treadmill Time (in min.) 5 min   Lumbar Weight 71 lbs   Repetitions 17   Rating of Perceived Exertion 4    -Cues to for toes up to decrease LE substitution and maintain hold time into full extension    PPT 10x5"  Cat cow x10  Bird dog x10   +Mountain climbers 3x1 min  Straight arm plank cone taps x10 ea hand  +Straight arm plank 2 cone tap x10  Quad fire hydrant w/GTB x10,3"  Supermans 2x10x3"  Suitcase carry 20# KB 2x1' each hand  paloff press in kneeling x10 ea, w/ rotation x10 ea  SOC 2x10x5"    Calvin participated in therapeutic exercises to develop strength, endurance, ROM, flexibility, posture, and core stabilization for 20 minutes including:  Treadmill x5'  +Sidesteps GTB x25' x2  EIL 2x10  Bridge on ball 10x3"  dead bugs 3x5 ea    NP:   Side plank + abd x10 ea  DKTC 10x5"    Peripheral muscle strengthening which included one set of 15-20 repetitions at a slow and controlled 10-13 second per rep pace focused on strengthening supporting musculature in order to improve body mechanics and functional mobility. Patient and therapist focused on proper form during treatment to ensure optimal strengthening of each targeted muscle group.  Machines utilized included:Torso rotation, Leg Ext, Leg Curl, Chest Press, and Rowing  To be added next visit:Triceps, Biceps, Hip Abd, Hip Add, and Leg Press      Calvin participated in dynamic functional therapeutic activities to improve functional performance and simulate household and community activities for -  minutes. The following activities were included:      Calvin received " manual therapy techniques for -  minutes. The following activities were included:      Pt given cold pack for 5 minutes to low back in Z-lie.    Patient Education and Home Exercises     Home exercises include:  EIL, DKTC,cat cow, SOC, bridge, bird dog  Cardio program (V5): - 12/6/23  Lifting education (V11): -  Posture/Lumbar roll: yes  Fridge Magnet Discharge handout (date given): -  Equipment at home/gym membership: yes    Education provided:   - PT role and POC  - HEP    Written Home Exercises Provided: Patient instructed to cont prior HEP.  Exercises were reviewed and Calvin was able to demonstrate them prior to the end of the session.  Calvin demonstrated good  understanding of the education provided.     See EMR under Patient Instructions for exercises provided prior visit.    Assessment   Calvin returns to PT without pain and requests to focus on strengthening as he did a lot of stretching and mobility exercises prior to arrival. Patient tolerated progressed core and lumbopelvic stabilization exercises well without adverse effects and appropriate challenge, did require minimal cueing to decrease hip shift, keep hips level and activate periscapular muscles with plank variations. Patient able to perform 17 reps on l/s medx machine with an RPE of 4/10, did require vc to decrease LE involvement. Discussed benefits of cardio program and provided handout via patient instructions. Continue to progress per HB protocol.     Patient is making good progress towards established goals.  Pt will continue to benefit from skilled outpatient physical therapy to address the deficits stated in the impairment chart, provide pt/family education and to maximize pt's level of independence in the home and community environment.     Anticipated Barriers for therapy: none  Pt's spiritual, cultural and educational needs considered and pt agreeable to plan of care and goals as stated below:     GOALS: Pt is in agreement with the following  goals.     Short term goals:  6 weeks or 10 visits   - Pt will demonstrate increased lumbar MedX ROM by at least 6 degrees from the initial ROM value with improvements noted in functional ROM and ability to perform ADLs. Appropriate and Ongoing  - Pt will demonstrate increased MedX average isometric strength value by 15% from initial test resulting in improved ability to perform bending, lifting, and carrying activities safely, confidently. Appropriate and Ongoing  - Pt will report a reduction in worst pain score by 1-2 points for improved tolerance for tennis. Appropriate and Ongoing  - Pt able to perform HEP correctly with minimal cueing or supervision from therapist to encourage independent management of symptoms. Appropriate and Ongoing     Long term goals: 10 weeks or 20 visits   - Pt will demonstrate increased lumbar MedX ROM by at least 9 degrees from initial ROM value, resulting in improved ability to perform functional forward bending while standing and sitting. Appropriate and Ongoing  - Pt will demonstrate increased MedX average isometric strength value by 30% from initial test resulting in improved ability to perform bending, lifting, and carrying activities safely and confidently. Appropriate and Ongoing  - Pt to demonstrate ability to independently control and reduce their pain through posture positioning and mechanical movements throughout a typical day. Appropriate and Ongoing  - Pt will demonstrate reduced pain and improved functional outcomes as reported on the FOTO by reaching an intake score of >/= 66% functional ability in order to demonstrate subjective improvement in patient's condition. . Appropriate and Ongoing  - Pt will demonstrate independence with the HEP at discharge. Appropriate and Ongoing  - Pt will be able to lift 20lb box from floor to plinth and back 10 times with appropriate form and no increased pain. Appropriate and Ongoing    Plan     Continue with established Plan of Care  towards established PT goals.     Therapist: Malinda Duenas, PTA  12/6/2023

## 2023-12-08 ENCOUNTER — CLINICAL SUPPORT (OUTPATIENT)
Dept: REHABILITATION | Facility: OTHER | Age: 58
End: 2023-12-08
Payer: COMMERCIAL

## 2023-12-08 DIAGNOSIS — R29.898 DECREASED STRENGTH OF TRUNK AND BACK: Primary | ICD-10-CM

## 2023-12-08 DIAGNOSIS — M25.69 DECREASED RANGE OF MOTION OF TRUNK AND BACK: ICD-10-CM

## 2023-12-08 PROCEDURE — 97110 THERAPEUTIC EXERCISES: CPT

## 2023-12-08 PROCEDURE — 97112 NEUROMUSCULAR REEDUCATION: CPT

## 2023-12-08 NOTE — PROGRESS NOTES
"[facility]  Physical Therapy Treatment Note     Name: Calvin Smith  Clinic Number: [id number]    Therapy Diagnosis:   Encounter Diagnoses   Name Primary?    Decreased strength of trunk and back Yes    Decreased range of motion of trunk and back          Physician: Any Thomas MD    Visit Date: 12/8/2023     Physician Orders: PT Eval and Treat  Medical Diagnosis from Referral:   M51.36 (ICD-10-CM) - DDD (degenerative disc disease), lumbar   M43.16 (ICD-10-CM) - Spondylolisthesis of lumbar region   M47.816 (ICD-10-CM) - Lumbar spondylosis      Evaluation Date: 11/10/2023  Authorization Period Expiration: 9/11/2024  Plan of Care Expiration: 1/19/2024  Reassessment Due: 12/10/2023  Visit # / Visits authorized: 6/20  MedX testing visit 1     Time In: 2:25 pm  Time Out: 3:25 pm  Total Billable Time: 55 minutes  INSURANCE and OUTCOMES: Fee for Service with FOTO Outcomes 2/3     Precautions: standard     Pattern of pain determined: movement responder/stability     Subjective   Calvin Smith reports he has been feeling much better the last week. He is going to try to play golf again this weekend.        Patient reports tolerating previous visit well  Patient reports their pain to be  0 /10 on a 0-10 scale with 0 being no pain and 10 being the worst pain imaginable.  Pain Location: bilateral LB     Occupation: Manager [employer], mostly seated   Leisure: Golf, Tennis      Pt goals: "I want to learn how to strengthen my back"   Objective      Lumbar  Isometric Testing on Med X equipment: Testing administered by PT    Lumbar Isometric Testing on Med X equipment: Testing administered by PT     Test Initial Midpoint Final   Date 11/10/2023       ROM 3-48 deg       Max Peak Torque 130        Min Peak Torque 60        Flex/Ext Ratio 2.1:1       % below normative data 60       % gain from initial test Not available visit 1            Outcomes:  Intake Score: 62%  Visit 6 Score:   Visit 10 " "Score:   Discharge Score:  Goal Score: 68%     Treatment     Calvin received the treatments listed below:      Calvin participated in neuromuscular re-education activities to improve balance, coordination, proprioception, motor control and/or posture for 30 minutes. The following activities were included:        12/8/2023     3:33 PM   [facility] - Short   Visit Number 6   VAS Pain Rating 0   Treadmill Time (in min.) 5 min   Extension in Lying 20   Lumbar Weight 71 lbs   Repetitions 20   Rating of Perceived Exertion 4        -Cues to for toes up to decrease LE substitution and maintain hold time into full extension    Cat cow x10  Mountain climbers 3x1 min  Straight arm plank cone taps 2x10 ea hand  Quad fire hydrant w/GTB x10,3"  paloff press in SLS x10 ea way, ea foot  Core pull downs BTB x15  Lat pull down marches x15 ea    NP:  PPT 10x5"  Bird dog x10   Supermans 2x10x3"  Suitcase carry 20# KB 2x1' each hand  SOC 2x10x5"    Calvin participated in therapeutic exercises to develop strength, endurance, ROM, flexibility, posture, and core stabilization for 25 minutes including:    Treadmill x5'  Sidesteps GTB 2x25'  Monster walks GTB 2x25' fwd/bwd  EIL 2x10  Bridge on ball 10x3"  dead bugs 3x5 ea    NP:   Side plank + abd x10 ea  DKTC 10x5"    Peripheral muscle strengthening which included one set of 15-20 repetitions at a slow and controlled 10-13 second per rep pace focused on strengthening supporting musculature in order to improve body mechanics and functional mobility. Patient and therapist focused on proper form during treatment to ensure optimal strengthening of each targeted muscle group.  Machines utilized included:Torso rotation, Leg Ext, Leg Curl, Chest Press, and Rowing  To be added next visit:Triceps, Biceps, Hip Abd, Hip Add, and Leg Press      Calvin participated in dynamic functional therapeutic activities to improve functional performance and simulate household and community activities for -  minutes. " The following activities were included:      Calvin received manual therapy techniques for -  minutes. The following activities were included:      Pt given cold pack for 5 minutes to low back in Z-lie.    Patient Education and Home Exercises     Home exercises include:  EIL, DKTC,cat cow, SOC, bridge, bird dog  Cardio program (V5): - 12/6/23  Lifting education (V11): -  Posture/Lumbar roll: yes  Fridge Magnet Discharge handout (date given): -  Equipment at home/gym membership: yes    Education provided:   - PT role and POC  - HEP    Written Home Exercises Provided: Patient instructed to cont prior HEP.  Exercises were reviewed and Calvin was able to demonstrate them prior to the end of the session.  Calvin demonstrated good  understanding of the education provided.     See EMR under Patient Instructions for exercises provided prior visit.    Assessment   Calvin presents today without complaints of pain. Continued with strengthening exercises from previous visits and some core and glute strengthening progressions were added. Continue to progress core strengthening and stability as tolerated. Medx resistance maintained at 71 ft/lbs and pt was able to complete 20 reps with 4/10 RPE. Progress 5-10% next visit    Patient is making good progress towards established goals.  Pt will continue to benefit from skilled outpatient physical therapy to address the deficits stated in the impairment chart, provide pt/family education and to maximize pt's level of independence in the home and community environment.     Anticipated Barriers for therapy: none  Pt's spiritual, cultural and educational needs considered and pt agreeable to plan of care and goals as stated below:     GOALS: Pt is in agreement with the following goals.     Short term goals:  6 weeks or 10 visits   - Pt will demonstrate increased lumbar MedX ROM by at least 6 degrees from the initial ROM value with improvements noted in functional ROM and ability to perform ADLs.  Appropriate and Ongoing  - Pt will demonstrate increased MedX average isometric strength value by 15% from initial test resulting in improved ability to perform bending, lifting, and carrying activities safely, confidently. Appropriate and Ongoing  - Pt will report a reduction in worst pain score by 1-2 points for improved tolerance for tennis. Appropriate and Ongoing  - Pt able to perform HEP correctly with minimal cueing or supervision from therapist to encourage independent management of symptoms. Appropriate and Ongoing     Long term goals: 10 weeks or 20 visits   - Pt will demonstrate increased lumbar MedX ROM by at least 9 degrees from initial ROM value, resulting in improved ability to perform functional forward bending while standing and sitting. Appropriate and Ongoing  - Pt will demonstrate increased MedX average isometric strength value by 30% from initial test resulting in improved ability to perform bending, lifting, and carrying activities safely and confidently. Appropriate and Ongoing  - Pt to demonstrate ability to independently control and reduce their pain through posture positioning and mechanical movements throughout a typical day. Appropriate and Ongoing  - Pt will demonstrate reduced pain and improved functional outcomes as reported on the FOTO by reaching an intake score of >/= 66% functional ability in order to demonstrate subjective improvement in patient's condition. . Appropriate and Ongoing  - Pt will demonstrate independence with the HEP at discharge. Appropriate and Ongoing  - Pt will be able to lift 20lb box from floor to plinth and back 10 times with appropriate form and no increased pain. Appropriate and Ongoing    Plan     Continue with established Plan of Care towards established PT goals.     Therapist: Arslan Harris, PT  12/8/2023

## 2023-12-12 ENCOUNTER — CLINICAL SUPPORT (OUTPATIENT)
Dept: REHABILITATION | Facility: OTHER | Age: 58
End: 2023-12-12
Payer: COMMERCIAL

## 2023-12-12 DIAGNOSIS — M25.69 DECREASED RANGE OF MOTION OF TRUNK AND BACK: ICD-10-CM

## 2023-12-12 DIAGNOSIS — R29.898 DECREASED STRENGTH OF TRUNK AND BACK: Primary | ICD-10-CM

## 2023-12-12 PROCEDURE — 97110 THERAPEUTIC EXERCISES: CPT

## 2023-12-12 PROCEDURE — 97112 NEUROMUSCULAR REEDUCATION: CPT

## 2023-12-12 NOTE — PROGRESS NOTES
"[facility]  Physical Therapy Treatment Note     Name: Calvin Smith  Clinic Number: [id number]    Therapy Diagnosis:   Encounter Diagnoses   Name Primary?    Decreased strength of trunk and back Yes    Decreased range of motion of trunk and back          Physician: Any Thomas MD    Visit Date: 12/12/2023     Physician Orders: PT Eval and Treat  Medical Diagnosis from Referral:   M51.36 (ICD-10-CM) - DDD (degenerative disc disease), lumbar   M43.16 (ICD-10-CM) - Spondylolisthesis of lumbar region   M47.816 (ICD-10-CM) - Lumbar spondylosis      Evaluation Date: 11/10/2023  Authorization Period Expiration: 9/11/2024  Plan of Care Expiration: 1/19/2024  Reassessment Due: 12/10/2023  Visit # / Visits authorized: 7/20  MedX testing visit 1     Time In: 2:53 pm  Time Out: 4:03 pm  Total Billable Time: 70 minutes  INSURANCE and OUTCOMES: Fee for Service with FOTO Outcomes 2/3     Precautions: standard     Pattern of pain determined: movement responder/stability     Subjective   Calvin Smith reports he is not having any discomfort at this time, he was working out yesterday and has some discomfort on the R side glut region       Patient reports tolerating previous visit well  Patient reports their pain to be  0 /10 on a 0-10 scale with 0 being no pain and 10 being the worst pain imaginable.  Pain Location: bilateral LB     Occupation: Manager [employer], mostly seated   Leisure: Golf, Tennis      Pt goals: "I want to learn how to strengthen my back"   Objective      Lumbar  Isometric Testing on Med X equipment: Testing administered by PT    Lumbar Isometric Testing on Med X equipment: Testing administered by PT     Test Initial Midpoint Final   Date 11/10/2023       ROM 3-48 deg       Max Peak Torque 130        Min Peak Torque 60        Flex/Ext Ratio 2.1:1       % below normative data 60       % gain from initial test Not available visit 1            Outcomes:  Intake Score: " "62%  Visit 6 Score:   Visit 10 Score:   Discharge Score:  Goal Score: 68%     Treatment     Calvin received the treatments listed below:      Calvin participated in neuromuscular re-education activities to improve balance, coordination, proprioception, motor control and/or posture for 30 minutes. The following activities were included:        12/12/2023     3:37 PM   [facility]   Visit Number 7   VAS Pain Rating 0   Treadmill Time (in min.) 5 min   Extension in Lying 20   Lumbar Weight 75 lbs   Repetitions 16   Rating of Perceived Exertion 3   Ice - Z Lie (in min.) 5          -Cues to for toes up to decrease LE substitution and maintain hold time into full extension    Cat cow x10  Mountain climbers 3x1 min  Straight arm plank cone taps 2x10 ea hand  Quad fire hydrant w/GTB x10,3"  paloff press in SLS x10 ea way, ea foot  Core pull downs BTB x15  Lat pull down marches x15 ea  +Bird dog + ROW x10, 10# (can go up in weight next visit)    NP:  PPT 10x5"  Supermans 2x10x3"  Suitcase carry 20# KB 2x1' each hand  SOC 2x10x5"    Calvin participated in therapeutic exercises to develop strength, endurance, ROM, flexibility, posture, and core stabilization for 25 minutes including:    Treadmill x5'  Sidesteps GTB 2x25'  Monster walks GTB 2x25' fwd/bwd  EIL 2x10  Bridge on ball 10x3"  dead bugs 3x5 ea with ball    NP:   Side plank + abd x10 ea  DKTC 10x5"    Peripheral muscle strengthening which included one set of 15-20 repetitions at a slow and controlled 10-13 second per rep pace focused on strengthening supporting musculature in order to improve body mechanics and functional mobility. Patient and therapist focused on proper form during treatment to ensure optimal strengthening of each targeted muscle group.  Machines utilized included:Torso rotation, Leg Ext, Leg Curl, Chest Press, and Rowing  To be added next visit:Triceps, Biceps, Hip Abd, Hip Add, and Leg Press      Calvin participated in dynamic functional therapeutic " activities to improve functional performance and simulate household and community activities for -  minutes. The following activities were included:      Calvin received manual therapy techniques for -  minutes. The following activities were included:      Pt given cold pack for 5 minutes to low back in Z-lie.    Patient Education and Home Exercises     Home exercises include:  EIL, DKTC,cat cow, SOC, bridge, bird dog  Cardio program (V5): - 12/6/23  Lifting education (V11): -  Posture/Lumbar roll: yes  Fridge Magnet Discharge handout (date given): -  Equipment at home/gym membership: yes    Education provided:   - PT role and POC  - HEP    Written Home Exercises Provided: Patient instructed to cont prior HEP.  Exercises were reviewed and Calvin was able to demonstrate them prior to the end of the session.  Calvin demonstrated good  understanding of the education provided.     See EMR under Patient Instructions for exercises provided prior visit.    Assessment   Calvin presents today without complaints of pain at this time but did have an instance of prolonged aching at the R glut while working out. Continued with strengthening exercises from previous visits and some core and glute strengthening progressions were added. Also discussed using unilateral strength techniques to add to program for additional strength and stabilization. Continue to progress core strengthening and stability as tolerated. Medx resistance increased to 75 ft lbs, he was able to complete 16 repetitions at 3/10 RPE but remarks not wanted to push it.     Patient is making good progress towards established goals.  Pt will continue to benefit from skilled outpatient physical therapy to address the deficits stated in the impairment chart, provide pt/family education and to maximize pt's level of independence in the home and community environment.     Anticipated Barriers for therapy: none  Pt's spiritual, cultural and educational needs considered and pt  agreeable to plan of care and goals as stated below:     GOALS: Pt is in agreement with the following goals.     Short term goals:  6 weeks or 10 visits   - Pt will demonstrate increased lumbar MedX ROM by at least 6 degrees from the initial ROM value with improvements noted in functional ROM and ability to perform ADLs. Appropriate and Ongoing  - Pt will demonstrate increased MedX average isometric strength value by 15% from initial test resulting in improved ability to perform bending, lifting, and carrying activities safely, confidently. Appropriate and Ongoing  - Pt will report a reduction in worst pain score by 1-2 points for improved tolerance for tennis. Appropriate and Ongoing  - Pt able to perform HEP correctly with minimal cueing or supervision from therapist to encourage independent management of symptoms. Appropriate and Ongoing     Long term goals: 10 weeks or 20 visits   - Pt will demonstrate increased lumbar MedX ROM by at least 9 degrees from initial ROM value, resulting in improved ability to perform functional forward bending while standing and sitting. Appropriate and Ongoing  - Pt will demonstrate increased MedX average isometric strength value by 30% from initial test resulting in improved ability to perform bending, lifting, and carrying activities safely and confidently. Appropriate and Ongoing  - Pt to demonstrate ability to independently control and reduce their pain through posture positioning and mechanical movements throughout a typical day. Appropriate and Ongoing  - Pt will demonstrate reduced pain and improved functional outcomes as reported on the FOTO by reaching an intake score of >/= 66% functional ability in order to demonstrate subjective improvement in patient's condition. . Appropriate and Ongoing  - Pt will demonstrate independence with the HEP at discharge. Appropriate and Ongoing  - Pt will be able to lift 20lb box from floor to plinth and back 10 times with appropriate form  and no increased pain. Appropriate and Ongoing    Plan     Continue with established Plan of Care towards established PT goals.     Therapist: Jhonny Santamaria, PT  12/12/2023

## 2023-12-14 ENCOUNTER — CLINICAL SUPPORT (OUTPATIENT)
Dept: REHABILITATION | Facility: OTHER | Age: 58
End: 2023-12-14
Payer: COMMERCIAL

## 2023-12-14 DIAGNOSIS — R29.898 DECREASED STRENGTH OF TRUNK AND BACK: Primary | ICD-10-CM

## 2023-12-14 DIAGNOSIS — M25.69 DECREASED RANGE OF MOTION OF TRUNK AND BACK: ICD-10-CM

## 2023-12-14 PROCEDURE — 97110 THERAPEUTIC EXERCISES: CPT

## 2023-12-14 PROCEDURE — 97112 NEUROMUSCULAR REEDUCATION: CPT

## 2023-12-14 NOTE — PROGRESS NOTES
"[facility]  Physical Therapy Treatment Note     Name: Calvin Smith  Clinic Number: [id number]    Therapy Diagnosis:   Encounter Diagnoses   Name Primary?    Decreased strength of trunk and back Yes    Decreased range of motion of trunk and back        Physician: Any Thomas MD    Visit Date: 12/14/2023     Physician Orders: PT Eval and Treat  Medical Diagnosis from Referral:   M51.36 (ICD-10-CM) - DDD (degenerative disc disease), lumbar   M43.16 (ICD-10-CM) - Spondylolisthesis of lumbar region   M47.816 (ICD-10-CM) - Lumbar spondylosis      Evaluation Date: 11/10/2023  Authorization Period Expiration: 9/11/2024  Plan of Care Expiration: 1/19/2024  Reassessment Due: 1/8/2023  Visit # / Visits authorized: 8/20  MedX testing visit 1     Time In: 2:45 pm  Time Out: 3:45 pm  Total Billable Time: 55 minutes  INSURANCE and OUTCOMES: Fee for Service with FOTO Outcomes 2/3     Precautions: standard     Pattern of pain determined: movement responder/stability     Subjective   Calvin Smith reports no back or glute pain right now, worked out yesterday with min soreness after and no pain     Patient reports tolerating previous visit well  Patient reports their pain to be  0 /10 on a 0-10 scale with 0 being no pain and 10 being the worst pain imaginable.  Pain Location: bilateral LB     Occupation: Manager [employer], mostly seated   Leisure: Golf, Tennis      Pt goals: "I want to learn how to strengthen my back"   Objective      Lumbar  Isometric Testing on Med X equipment: Testing administered by PT    Lumbar Isometric Testing on Med X equipment: Testing administered by PT     Test Initial Midpoint Final   Date 11/10/2023       ROM 3-48 deg       Max Peak Torque 130        Min Peak Torque 60        Flex/Ext Ratio 2.1:1       % below normative data 60       % gain from initial test Not available visit 1            Outcomes:  Intake Score: 62%  Visit 6 Score:   Visit 10 Score: " "  Discharge Score:  Goal Score: 68%     Treatment     Calvin received the treatments listed below:      Calvin participated in neuromuscular re-education activities to improve balance, coordination, proprioception, motor control and/or posture for 30 minutes. The following activities were included:        12/14/2023     3:21 PM   [facility] - Short   Visit Number 8   VAS Pain Rating 0   Treadmill Time (in min.) 5 min   Extension in Lying 10   Lumbar Weight 75 lbs   Repetitions 18   Rating of Perceived Exertion 5          -Cues to for toes up to decrease LE substitution and maintain hold time into full extension    Cat cow x10  Mountain climbers 3x1 min  Straight arm plank cone taps 2x10 ea hand  Quad fire hydrant w/GTB x10,3"  paloff press in SLS x10 ea way, ea foot  Bird dog + ROW x10, 15#   +Kneeling banded core rotation GTB x10 ea , x10 diagonal ea    NP:  Core pull downs BTB x15  Lat pull down marches x15 ea  PPT 10x5"  Supermans 2x10x3"  Suitcase carry 20# KB 2x1' each hand  SOC 2x10x5"    Calvin participated in therapeutic exercises to develop strength, endurance, ROM, flexibility, posture, and core stabilization for 25 minutes including:    Treadmill x5'  Sidesteps GTB 2x25'  Monster walks GTB 2x25' fwd/bwd   EIL 2x10  Bridge on ball 2x10x3"  dead bugs 3x5 ea with ball    NP:   Side plank + abd x10 ea  DKTC 10x5"    Peripheral muscle strengthening which included one set of 15-20 repetitions at a slow and controlled 10-13 second per rep pace focused on strengthening supporting musculature in order to improve body mechanics and functional mobility. Patient and therapist focused on proper form during treatment to ensure optimal strengthening of each targeted muscle group.  Machines utilized included:Torso rotation, Leg Ext, Leg Curl, Chest Press, and Rowing  To be added next visit:Triceps, Biceps, Hip Abd, Hip Add, and Leg Press      Calvin participated in dynamic functional therapeutic activities to improve " functional performance and simulate household and community activities for -  minutes. The following activities were included:      Calvin received manual therapy techniques for -  minutes. The following activities were included:      Pt given cold pack for 5 minutes to low back in Z-lie.    Patient Education and Home Exercises     Home exercises include:  EIL, DKTC,cat cow, SOC, bridge, bird dog  Cardio program (V5): - 12/6/23  Lifting education (V11): -  Posture/Lumbar roll: yes  Fridge Magnet Discharge handout (date given): -  Equipment at home/gym membership: yes    Education provided:   - PT role and POC  - HEP    Written Home Exercises Provided: Patient instructed to cont prior HEP.  Exercises were reviewed and Calvin was able to demonstrate them prior to the end of the session.  Calvin demonstrated good  understanding of the education provided.     See EMR under Patient Instructions for exercises provided prior visit.    Assessment   Calvin tolerated treatment well today without pain aggravation. Progressed core and lumbar strengthening and stability exercises with good tolerance and no adverse effects. Medx ROM increased to 0-51 deg and resistance maintained at 75 ft/lbs and pt was able to complete 18 reps with 5/10 RPE. Continue to progress per HB protocol.     Patient is making good progress towards established goals.  Pt will continue to benefit from skilled outpatient physical therapy to address the deficits stated in the impairment chart, provide pt/family education and to maximize pt's level of independence in the home and community environment.     Anticipated Barriers for therapy: none  Pt's spiritual, cultural and educational needs considered and pt agreeable to plan of care and goals as stated below:     GOALS: Pt is in agreement with the following goals.     Short term goals:  6 weeks or 10 visits   - Pt will demonstrate increased lumbar MedX ROM by at least 6 degrees from the initial ROM value with  improvements noted in functional ROM and ability to perform ADLs. Appropriate and Ongoing  - Pt will demonstrate increased MedX average isometric strength value by 15% from initial test resulting in improved ability to perform bending, lifting, and carrying activities safely, confidently. Appropriate and Ongoing  - Pt will report a reduction in worst pain score by 1-2 points for improved tolerance for tennis. Appropriate and Ongoing  - Pt able to perform HEP correctly with minimal cueing or supervision from therapist to encourage independent management of symptoms. Appropriate and Ongoing     Long term goals: 10 weeks or 20 visits   - Pt will demonstrate increased lumbar MedX ROM by at least 9 degrees from initial ROM value, resulting in improved ability to perform functional forward bending while standing and sitting. Appropriate and Ongoing  - Pt will demonstrate increased MedX average isometric strength value by 30% from initial test resulting in improved ability to perform bending, lifting, and carrying activities safely and confidently. Appropriate and Ongoing  - Pt to demonstrate ability to independently control and reduce their pain through posture positioning and mechanical movements throughout a typical day. Appropriate and Ongoing  - Pt will demonstrate reduced pain and improved functional outcomes as reported on the FOTO by reaching an intake score of >/= 66% functional ability in order to demonstrate subjective improvement in patient's condition. . Appropriate and Ongoing  - Pt will demonstrate independence with the HEP at discharge. Appropriate and Ongoing  - Pt will be able to lift 20lb box from floor to plinth and back 10 times with appropriate form and no increased pain. Appropriate and Ongoing    Plan     Continue with established Plan of Care towards established PT goals.     Therapist: Arslan Harris, PT  12/14/2023

## 2023-12-20 ENCOUNTER — CLINICAL SUPPORT (OUTPATIENT)
Dept: REHABILITATION | Facility: OTHER | Age: 58
End: 2023-12-20
Payer: COMMERCIAL

## 2023-12-20 DIAGNOSIS — R29.898 DECREASED STRENGTH OF TRUNK AND BACK: Primary | ICD-10-CM

## 2023-12-20 DIAGNOSIS — M25.69 DECREASED RANGE OF MOTION OF TRUNK AND BACK: ICD-10-CM

## 2023-12-20 PROCEDURE — 97112 NEUROMUSCULAR REEDUCATION: CPT

## 2023-12-20 PROCEDURE — 97110 THERAPEUTIC EXERCISES: CPT

## 2023-12-20 NOTE — PROGRESS NOTES
"[facility]  Physical Therapy Treatment Note     Name: Calvin Smith  Clinic Number: [id number]    Therapy Diagnosis:   Encounter Diagnoses   Name Primary?    Decreased strength of trunk and back Yes    Decreased range of motion of trunk and back        Physician: Any Thomas MD    Visit Date: 12/20/2023     Physician Orders: PT Eval and Treat  Medical Diagnosis from Referral:   M51.36 (ICD-10-CM) - DDD (degenerative disc disease), lumbar   M43.16 (ICD-10-CM) - Spondylolisthesis of lumbar region   M47.816 (ICD-10-CM) - Lumbar spondylosis      Evaluation Date: 11/10/2023  Authorization Period Expiration: 9/11/2024  Plan of Care Expiration: 1/19/2024  Reassessment Due: 1/8/2023  Visit # / Visits authorized: 9/20  MedX testing visit 1     Time In: 3:15 pm  Time Out: 4:30 pm  Total Billable Time: 70 minutes  INSURANCE and OUTCOMES: Fee for Service with FOTO Outcomes 2/3     Precautions: standard     Pattern of pain determined: movement responder/stability     Subjective   Calvin Smith reports no back or glute pain right now, worked out yesterday with min soreness after and no pain     Patient reports tolerating previous visit well  Patient reports their pain to be  0 /10 on a 0-10 scale with 0 being no pain and 10 being the worst pain imaginable.  Pain Location: bilateral LB     Occupation: Manager [employer], mostly seated   Leisure: Golf, Tennis      Pt goals: "I want to learn how to strengthen my back"   Objective      Lumbar  Isometric Testing on Med X equipment: Testing administered by PT    Lumbar Isometric Testing on Med X equipment: Testing administered by PT     Test Initial Midpoint Final   Date 11/10/2023       ROM 3-48 deg       Max Peak Torque 130        Min Peak Torque 60        Flex/Ext Ratio 2.1:1       % below normative data 60       % gain from initial test Not available visit 1            Outcomes:  Intake Score: 62%  Visit 6 Score:   Visit 10 Score: " "  Discharge Score:  Goal Score: 68%     Treatment     Calvin received the treatments listed below:      Calvin participated in neuromuscular re-education activities to improve balance, coordination, proprioception, motor control and/or posture for 25 minutes. The following activities were included:        12/20/2023     3:58 PM   [facility] - Short   Visit Number 9   VAS Pain Rating 0   Treadmill Time (in min.) 4 min   Extension in Lying 10   Lumbar Weight 75 lbs   Repetitions 20   Rating of Perceived Exertion 5          -Cues to for toes up to decrease LE substitution and maintain hold time into full extension    Cat cow x10  Straight arm plank cone taps 2x10 ea hand  +TA brace with ball 10x5" + SLR 10x3" ea  paloff press in SLS x10 ea way, ea foot  Bird dog + ROW x10, 15#   Kneeling banded core rotation GTB x10 ea , x10 diagonal ea  SLS around the worlds 2x20 CW/CCW 15#    NP:  Quad fire hydrant w/GTB x10,3"  Mountain climbers 3x1 min  Core pull downs BTB x15  Lat pull down marches x15 ea  PPT 10x5"  Supermans 2x10x3"  Suitcase carry 20# KB 2x1' each hand  SOC 2x10x5"      Calvin participated in therapeutic exercises to develop strength, endurance, ROM, flexibility, posture, and core stabilization for 40 minutes including:    Treadmill x5'  1/2 kneel hip flexor stretch 2x30"  Sidesteps GTB 2x25'  Monster walks GTB 2x25' fwd/bwd   EIL 2x10  Bridge on ball 2x10x3"  dead bugs 3x5 ea with ball    NP:   Side plank + abd x10 ea  DKTC 10x5"    Peripheral muscle strengthening which included one set of 15-20 repetitions at a slow and controlled 10-13 second per rep pace focused on strengthening supporting musculature in order to improve body mechanics and functional mobility. Patient and therapist focused on proper form during treatment to ensure optimal strengthening of each targeted muscle group.  Machines utilized included:Torso rotation, Leg Ext, Leg Curl, Chest Press, and Rowing  To be added next visit:Brodie, " Biceps, Hip Abd, Hip Add, and Leg Press      Calvin participated in dynamic functional therapeutic activities to improve functional performance and simulate household and community activities for -  minutes. The following activities were included:      Calvin received manual therapy techniques for -  minutes. The following activities were included:      Pt given cold pack for 5 minutes to low back in Z-lie.    Patient Education and Home Exercises     Home exercises include:  EIL, DKTC,cat cow, SOC, bridge, bird dog  Cardio program (V5): - 12/6/23  Lifting education (V11): -  Posture/Lumbar roll: yes  Fridge Magnet Discharge handout (date given): -  Equipment at home/gym membership: yes    Education provided:   - PT role and POC  - HEP    Written Home Exercises Provided: Patient instructed to cont prior HEP.  Exercises were reviewed and Calvin was able to demonstrate them prior to the end of the session.  Calvin demonstrated good  understanding of the education provided.     See EMR under Patient Instructions for exercises provided prior visit.    Assessment   Calvin presents today without complaints of pain. Progressed dynamic core stability and lumbar strengthening activities with good toelrance and no adverse effects. Continues to have difficulty maintaining isometric extension hold while performing medx due to end range weakness. Medx resistance maintained at 75 ft/lbs and pt was able to complete 20 reps with 5/10 RPE. Progress 5% next visit.     Patient is making good progress towards established goals.  Pt will continue to benefit from skilled outpatient physical therapy to address the deficits stated in the impairment chart, provide pt/family education and to maximize pt's level of independence in the home and community environment.     Anticipated Barriers for therapy: none  Pt's spiritual, cultural and educational needs considered and pt agreeable to plan of care and goals as stated below:     GOALS: Pt is in agreement  with the following goals.     Short term goals:  6 weeks or 10 visits   - Pt will demonstrate increased lumbar MedX ROM by at least 6 degrees from the initial ROM value with improvements noted in functional ROM and ability to perform ADLs. Appropriate and Ongoing  - Pt will demonstrate increased MedX average isometric strength value by 15% from initial test resulting in improved ability to perform bending, lifting, and carrying activities safely, confidently. Appropriate and Ongoing  - Pt will report a reduction in worst pain score by 1-2 points for improved tolerance for tennis. Appropriate and Ongoing  - Pt able to perform HEP correctly with minimal cueing or supervision from therapist to encourage independent management of symptoms. Appropriate and Ongoing     Long term goals: 10 weeks or 20 visits   - Pt will demonstrate increased lumbar MedX ROM by at least 9 degrees from initial ROM value, resulting in improved ability to perform functional forward bending while standing and sitting. Appropriate and Ongoing  - Pt will demonstrate increased MedX average isometric strength value by 30% from initial test resulting in improved ability to perform bending, lifting, and carrying activities safely and confidently. Appropriate and Ongoing  - Pt to demonstrate ability to independently control and reduce their pain through posture positioning and mechanical movements throughout a typical day. Appropriate and Ongoing  - Pt will demonstrate reduced pain and improved functional outcomes as reported on the FOTO by reaching an intake score of >/= 66% functional ability in order to demonstrate subjective improvement in patient's condition. . Appropriate and Ongoing  - Pt will demonstrate independence with the HEP at discharge. Appropriate and Ongoing  - Pt will be able to lift 20lb box from floor to plinth and back 10 times with appropriate form and no increased pain. Appropriate and Ongoing    Plan     Continue with  established Plan of Care towards established PT goals.     Therapist: Arslan Harris, PT  12/20/2023

## 2023-12-28 ENCOUNTER — CLINICAL SUPPORT (OUTPATIENT)
Dept: REHABILITATION | Facility: OTHER | Age: 58
End: 2023-12-28
Payer: COMMERCIAL

## 2023-12-28 DIAGNOSIS — R29.898 DECREASED STRENGTH OF TRUNK AND BACK: Primary | ICD-10-CM

## 2023-12-28 DIAGNOSIS — M25.69 DECREASED RANGE OF MOTION OF TRUNK AND BACK: ICD-10-CM

## 2023-12-28 PROCEDURE — 97112 NEUROMUSCULAR REEDUCATION: CPT

## 2023-12-28 PROCEDURE — 97110 THERAPEUTIC EXERCISES: CPT

## 2023-12-28 NOTE — PROGRESS NOTES
"[facility]  Physical Therapy Treatment Note     Name: Calvin Smith  Clinic Number: [id number]    Therapy Diagnosis:   Encounter Diagnoses   Name Primary?    Decreased strength of trunk and back Yes    Decreased range of motion of trunk and back          Physician: Any Thomsa MD    Visit Date: 12/28/2023     Physician Orders: PT Eval and Treat  Medical Diagnosis from Referral:   M51.36 (ICD-10-CM) - DDD (degenerative disc disease), lumbar   M43.16 (ICD-10-CM) - Spondylolisthesis of lumbar region   M47.816 (ICD-10-CM) - Lumbar spondylosis      Evaluation Date: 11/10/2023  Authorization Period Expiration: 9/11/2024  Plan of Care Expiration: 1/19/2024  Reassessment Due: 1/28/2023  Visit # / Visits authorized: 10/20  MedX testing visit 1     Time In: 1:00 pm  Time Out: 2:00 pm  Total Billable Time: 60 minutes  INSURANCE and OUTCOMES: Fee for Service with FOTO Outcomes 2/3     Precautions: standard     Pattern of pain determined: movement responder/stability     Subjective   Calvin Smith reports he feels much stronger overall and has been having less pain     Patient reports tolerating previous visit well  Patient reports their pain to be  0 /10 on a 0-10 scale with 0 being no pain and 10 being the worst pain imaginable.  Pain Location: bilateral LB     Occupation: Manager [employer], mostly seated   Leisure: Golf, Tennis      Pt goals: "I want to learn how to strengthen my back"   Objective      Lumbar  Isometric Testing on Med X equipment: Testing administered by PT    Lumbar Isometric Testing on Med X equipment: Testing administered by PT     Test Initial Midpoint Final   Date 11/10/2023  12/28/2023     ROM 3-48 deg  0-51 deg     Max Peak Torque 130   222     Min Peak Torque 60   99     Flex/Ext Ratio 2.1:1  2.2:1     % below normative data 60 29     % gain from initial test Not available visit 1  81          Outcomes:      Treatment     Calvin received the treatments listed " "below:      Calvin participated in neuromuscular re-education activities to improve balance, coordination, proprioception, motor control and/or posture for 15 minutes. The following activities were included:        12/28/2023     1:32 PM   [facility] - Short   Visit Number 10   VAS Pain Rating 0   Treadmill Time (in min.) 5 min   Extension in Lying 20   Lumbar Flexion 51   Lumbar Extension 0   Lumbar Peak Torque 222 ft. lbs.   Lumbar Weight 60 lbs   Repetitions 5          -Cues to for toes up to decrease LE substitution and maintain hold time into full extension    Cat cow x10  Straight arm plank cone taps 2x10 ea hand  TA brace with ball 10x5" + SLR 10x3" ea  paloff press in SLS x10 ea way, ea foot  Bird dog + ROW x10, 15#   Kneeling banded core rotation GTB x10 ea , x10 diagonal ea  SLS around the worlds 2x20 CW/CCW 15#    NP:  Quad fire hydrant w/GTB x10,3"  Mountain climbers 3x1 min  Core pull downs BTB x15  Lat pull down marches x15 ea  PPT 10x5"  Supermans 2x10x3"  Suitcase carry 20# KB 2x1' each hand  SOC 2x10x5"      Calvin participated in therapeutic exercises to develop strength, endurance, ROM, flexibility, posture, and core stabilization for 40 minutes including:    Treadmill x5'  1/2 kneel hip flexor stretch 2x30"  Sidesteps GTB 2x25'  Monster walks GTB 2x25' fwd/bwd   EIL 2x10  Bridge on ball 2x10x3"  dead bugs 3x5 ea with ball    NP:   Side plank + abd x10 ea  DKTC 10x5"    Peripheral muscle strengthening which included one set of 15-20 repetitions at a slow and controlled 10-13 second per rep pace focused on strengthening supporting musculature in order to improve body mechanics and functional mobility. Patient and therapist focused on proper form during treatment to ensure optimal strengthening of each targeted muscle group.  Machines utilized included:Torso rotation, Leg Ext, Leg Curl, Chest Press, and Rowing  To be added next visit:Triceps, Biceps, Hip Abd, Hip Add, and Leg Press      Calvin " participated in dynamic functional therapeutic activities to improve functional performance and simulate household and community activities for -  minutes. The following activities were included:      Calvin received manual therapy techniques for -  minutes. The following activities were included:      Pt given cold pack for 5 minutes to low back in Z-lie.    Patient Education and Home Exercises     Home exercises include:  EIL, DKTC,cat cow, SOC, bridge, bird dog  Cardio program (V5): - 12/6/23  Lifting education (V11): -  Posture/Lumbar roll: yes  Fridge Magnet Discharge handout (date given): -  Equipment at home/gym membership: yes    Education provided:   - PT role and POC  - HEP    Written Home Exercises Provided: Patient instructed to cont prior HEP.  Exercises were reviewed and Calvin was able to demonstrate them prior to the end of the session.  Calvin demonstrated good  understanding of the education provided.     See EMR under Patient Instructions for exercises provided prior visit.    Assessment   Patient has attended 10 visits at [facility] which included MD evaluation, PT evaluation with isometric testing, and physical therapy treatment including HEP instruction, education, aerobic activity, dynamic strengthening on MedX equipment for the spine, and whole body strengthening on MedX equipment with increasing resistance. Patient demonstrates increased ability to reduce symptoms, improve posture, improve ROM, and improve strength, as stated below:    -Improved posture, and using lumbar roll  -Improved lumbar ROM, initially on MedX test 3-28 deg and currently 0-51.  -Improved strength at each test point on lumbar MedX isometric test with 81 average improvement noted with reduced pain noted by patient.  -Initial outcome tool score 62% and current outcome tool score 76% indicating reduced pain and improved function.          Patient is making good progress towards established goals.  Pt will continue to  benefit from skilled outpatient physical therapy to address the deficits stated in the impairment chart, provide pt/family education and to maximize pt's level of independence in the home and community environment.     Anticipated Barriers for therapy: none  Pt's spiritual, cultural and educational needs considered and pt agreeable to plan of care and goals as stated below:     GOALS: Pt is in agreement with the following goals.     Short term goals:  6 weeks or 10 visits   - Pt will demonstrate increased lumbar MedX ROM by at least 6 degrees from the initial ROM value with improvements noted in functional ROM and ability to perform ADLs. MET  - Pt will demonstrate increased MedX average isometric strength value by 15% from initial test resulting in improved ability to perform bending, lifting, and carrying activities safely, confidently. MET  - Pt will report a reduction in worst pain score by 1-2 points for improved tolerance for tennis. MET  - Pt able to perform HEP correctly with minimal cueing or supervision from therapist to encourage independent management of symptoms. MET     Long term goals: 10 weeks or 20 visits   - Pt will demonstrate increased lumbar MedX ROM by at least 9 degrees from initial ROM value, resulting in improved ability to perform functional forward bending while standing and sitting. Appropriate and Ongoing  - Pt will demonstrate increased MedX average isometric strength value by 30% from initial test resulting in improved ability to perform bending, lifting, and carrying activities safely and confidently. MET  - Pt to demonstrate ability to independently control and reduce their pain through posture positioning and mechanical movements throughout a typical day.MET  - Pt will demonstrate reduced pain and improved functional outcomes as reported on the FOTO by reaching an intake score of >/= 66% functional ability in order to demonstrate subjective improvement in patient's condition. .  MET  - Pt will demonstrate independence with the HEP at discharge. Appropriate and Ongoing  - Pt will be able to lift 20lb box from floor to plinth and back 10 times with appropriate form and no increased pain. Appropriate and Ongoing    Plan     Continue with established Plan of Care towards established PT goals.     Therapist: Arslan Harris, PT  12/28/2023

## 2024-01-03 NOTE — PROGRESS NOTES
"[facility]  Physical Therapy Treatment Note     Name: Calvin Smith  Clinic Number: [id number]    Therapy Diagnosis:   Encounter Diagnoses   Name Primary?    Decreased strength of trunk and back Yes    Decreased range of motion of trunk and back          Physician: Any Thomas MD    Visit Date: 1/4/2024     Physician Orders: PT Eval and Treat  Medical Diagnosis from Referral:   M51.36 (ICD-10-CM) - DDD (degenerative disc disease), lumbar   M43.16 (ICD-10-CM) - Spondylolisthesis of lumbar region   M47.816 (ICD-10-CM) - Lumbar spondylosis      Evaluation Date: 11/10/2023  Authorization Period Expiration: 9/11/2024  Plan of Care Expiration: 1/19/2024  Reassessment Due: 1/28/2023  Visit # / Visits authorized: 11/20  MedX testing visit 1     Time In: 200 PM  Time Out: 305 PM   Total Billable Time: 65 minutes  INSURANCE and OUTCOMES: Fee for Service with FOTO Outcomes 2/3     Precautions: standard     Pattern of pain determined: movement responder/stability     Subjective   Calvin Smith reports he is doing great. No issues.      Patient reports tolerating previous visit well  Patient reports their pain to be  0 /10 on a 0-10 scale with 0 being no pain and 10 being the worst pain imaginable.  Pain Location: bilateral LB     Occupation: Manager [employer], mostly seated   Leisure: Golf, Tennis      Pt goals: "I want to learn how to strengthen my back"   Objective      Lumbar  Isometric Testing on Med X equipment: Testing administered by PT    Lumbar Isometric Testing on Med X equipment: Testing administered by PT     Test Initial Midpoint Final   Date 11/10/2023  12/28/2023     ROM 3-48 deg  0-51 deg     Max Peak Torque 130   222     Min Peak Torque 60   99     Flex/Ext Ratio 2.1:1  2.2:1     % below normative data 60 29     % gain from initial test Not available visit 1  81          Outcomes:      Treatment     Calvin received the treatments listed below:      Calvin participated in " "neuromuscular re-education activities to improve balance, coordination, proprioception, motor control and/or posture for 15 minutes. The following activities were included:          1/4/2024     2:03 PM   [facility]   Visit Number 11   Treadmill Time (in min.) 5 min   Extension in Lying 20   Lumbar Weight 78 lbs   Repetitions 15   Rating of Perceived Exertion 2   Ice - Z Lie (in min.) 5              -Cues to for toes up to decrease LE substitution and maintain hold time into full extension    Cat cow x10  Straight arm plank cone taps 2x10 ea hand  TA brace with ball 10x5" + SLR 10x3" ea  paloff press in SLS x10 ea way, ea foot  Bird dog + ROW x10, 15#   Kneeling banded core rotation GTB x10 ea , x10 diagonal ea  SLS around the worlds 2x20 CW/CCW 15#        Calvin participated in therapeutic exercises to develop strength, endurance, ROM, flexibility, posture, and core stabilization for 35 minutes including:    Treadmill x5'  1/2 kneel hip flexor stretch 2x30"  Sidesteps GTB 2x25'  Monster walks GTB 2x25' fwd/bwd   EIL 2x10  Bridge on ball 2x10x3"  dead bugs 3x5 ea with ball      Peripheral muscle strengthening which included one set of 15-20 repetitions at a slow and controlled 10-13 second per rep pace focused on strengthening supporting musculature in order to improve body mechanics and functional mobility. Patient and therapist focused on proper form during treatment to ensure optimal strengthening of each targeted muscle group.  Machines utilized included:Torso rotation, Leg Ext, Leg Curl, Chest Press, and Rowing  To be added next visit:Triceps, Biceps, Hip Abd, Hip Add, and Leg Press      Calvin participated in dynamic functional therapeutic activities to improve functional performance and simulate household and community activities for 15  minutes. The following activities were included:    Lifting mechanics  Hip hinge at wall x 15  Deadlift 10KB from floor 2 x 10  Golfers lift 5KB x 10 ea    Calvin received " manual therapy techniques for -  minutes. The following activities were included:      Pt given cold pack for 5 minutes to low back in Z-lie.    Patient Education and Home Exercises     Home exercises include:  EIL, DKTC,cat cow, SOC, bridge, bird dog  Cardio program (V5): - 12/6/23  Lifting education (V11): -  Posture/Lumbar roll: yes  Fridge Magnet Discharge handout (date given): -  Equipment at home/gym membership: yes    Education provided:   - PT role and POC  - HEP    Written Home Exercises Provided: Patient instructed to cont prior HEP.  Exercises were reviewed and Calvin was able to demonstrate them prior to the end of the session.  Calvin demonstrated good  understanding of the education provided.     See EMR under Patient Instructions for exercises provided prior visit.    Assessment   Calvin tolerated session well. Pt presets to session without c/o pain and overall improved tolerance for activities involving prolonged standing. Introduced lifting mechanics with pt demonstrating good performance requiring min verbal cues. MedX was performed at 78ft lbs with 15 reps performed at an exertion rating of 2/10. No issues with peripherals.         Patient is making good progress towards established goals.  Pt will continue to benefit from skilled outpatient physical therapy to address the deficits stated in the impairment chart, provide pt/family education and to maximize pt's level of independence in the home and community environment.     Anticipated Barriers for therapy: none  Pt's spiritual, cultural and educational needs considered and pt agreeable to plan of care and goals as stated below:     GOALS: Pt is in agreement with the following goals.     Short term goals:  6 weeks or 10 visits   - Pt will demonstrate increased lumbar MedX ROM by at least 6 degrees from the initial ROM value with improvements noted in functional ROM and ability to perform ADLs. MET  - Pt will demonstrate increased MedX average isometric  strength value by 15% from initial test resulting in improved ability to perform bending, lifting, and carrying activities safely, confidently. MET  - Pt will report a reduction in worst pain score by 1-2 points for improved tolerance for tennis. MET  - Pt able to perform HEP correctly with minimal cueing or supervision from therapist to encourage independent management of symptoms. MET     Long term goals: 10 weeks or 20 visits   - Pt will demonstrate increased lumbar MedX ROM by at least 9 degrees from initial ROM value, resulting in improved ability to perform functional forward bending while standing and sitting. Appropriate and Ongoing  - Pt will demonstrate increased MedX average isometric strength value by 30% from initial test resulting in improved ability to perform bending, lifting, and carrying activities safely and confidently. MET  - Pt to demonstrate ability to independently control and reduce their pain through posture positioning and mechanical movements throughout a typical day.MET  - Pt will demonstrate reduced pain and improved functional outcomes as reported on the FOTO by reaching an intake score of >/= 66% functional ability in order to demonstrate subjective improvement in patient's condition. . MET  - Pt will demonstrate independence with the HEP at discharge. Appropriate and Ongoing  - Pt will be able to lift 20lb box from floor to plinth and back 10 times with appropriate form and no increased pain. Appropriate and Ongoing    Plan     Continue with established Plan of Care towards established PT goals.     Therapist: Huber Romero, ELMA  1/4/2024

## 2024-01-04 ENCOUNTER — CLINICAL SUPPORT (OUTPATIENT)
Dept: REHABILITATION | Facility: OTHER | Age: 59
End: 2024-01-04
Payer: COMMERCIAL

## 2024-01-04 DIAGNOSIS — R29.898 DECREASED STRENGTH OF TRUNK AND BACK: Primary | ICD-10-CM

## 2024-01-04 DIAGNOSIS — M25.69 DECREASED RANGE OF MOTION OF TRUNK AND BACK: ICD-10-CM

## 2024-01-04 PROCEDURE — 97110 THERAPEUTIC EXERCISES: CPT | Mod: CQ

## 2024-01-04 PROCEDURE — 97530 THERAPEUTIC ACTIVITIES: CPT | Mod: CQ

## 2024-01-04 PROCEDURE — 97112 NEUROMUSCULAR REEDUCATION: CPT | Mod: CQ

## 2024-01-09 ENCOUNTER — PATIENT MESSAGE (OUTPATIENT)
Dept: INTERNAL MEDICINE | Facility: CLINIC | Age: 59
End: 2024-01-09
Payer: COMMERCIAL

## 2024-01-09 ENCOUNTER — CLINICAL SUPPORT (OUTPATIENT)
Dept: REHABILITATION | Facility: OTHER | Age: 59
End: 2024-01-09
Payer: COMMERCIAL

## 2024-01-09 DIAGNOSIS — M25.69 DECREASED RANGE OF MOTION OF TRUNK AND BACK: ICD-10-CM

## 2024-01-09 DIAGNOSIS — Z00.00 WELL ADULT EXAM: Primary | ICD-10-CM

## 2024-01-09 DIAGNOSIS — R29.898 DECREASED STRENGTH OF TRUNK AND BACK: Primary | ICD-10-CM

## 2024-01-09 PROCEDURE — 97112 NEUROMUSCULAR REEDUCATION: CPT | Mod: CQ

## 2024-01-09 PROCEDURE — 97110 THERAPEUTIC EXERCISES: CPT | Mod: CQ

## 2024-01-09 NOTE — PROGRESS NOTES
"[facility]  Physical Therapy Treatment Note     Name: Calvin Smith  Clinic Number: [id number]    Therapy Diagnosis:   No diagnosis found.        Physician: Any Thomas MD    Visit Date: 1/9/2024     Physician Orders: PT Eval and Treat  Medical Diagnosis from Referral:   M51.36 (ICD-10-CM) - DDD (degenerative disc disease), lumbar   M43.16 (ICD-10-CM) - Spondylolisthesis of lumbar region   M47.816 (ICD-10-CM) - Lumbar spondylosis      Evaluation Date: 11/10/2023  Authorization Period Expiration: 9/11/2024  Plan of Care Expiration: 1/19/2024  Reassessment Due: 1/28/2023  Visit # / Visits authorized: 12/20  MedX testing visit 1     Time In: 2:00 PM  Time Out: 3:00 PM   Total Billable Time: 65 minutes  INSURANCE and OUTCOMES: Fee for Service with FOTO Outcomes 2/3     Precautions: standard     Pattern of pain determined: movement responder/stability     Subjective   Calvin Smith reports he is doing great. No issues.      Patient reports tolerating previous visit well  Patient reports their pain to be  0 /10 on a 0-10 scale with 0 being no pain and 10 being the worst pain imaginable.  Pain Location: bilateral LB     Occupation: Manager [employer], mostly seated   Leisure: Golf, Tennis      Pt goals: "I want to learn how to strengthen my back"   Objective      Lumbar  Isometric Testing on Med X equipment: Testing administered by PT    Lumbar Isometric Testing on Med X equipment: Testing administered by PT     Test Initial Midpoint Final   Date 11/10/2023  12/28/2023     ROM 3-48 deg  0-51 deg     Max Peak Torque 130   222     Min Peak Torque 60   99     Flex/Ext Ratio 2.1:1  2.2:1     % below normative data 60 29     % gain from initial test Not available visit 1  81          Outcomes:      Treatment     Calvin received the treatments listed below:      Calvin participated in neuromuscular re-education activities to improve balance, coordination, proprioception, motor control and/or " "posture for 15 minutes. The following activities were included:                 -Cues to for toes up to decrease LE substitution and maintain hold time into full extension    Cat cow x10  Straight arm plank cone taps 2x10 ea hand  TA brace with ball 10x5" + SLR 10x3" ea  paloff press in SLS x10 ea way, ea foot  Bird dog + ROW x10, 15#   +Bird dog c/ Blue TB R and L  x10 ea  Kneeling banded core rotation GTB x10 ea , x10 diagonal ea  SLS around the worlds 2x20 CW/CCW 15#        Calvin participated in therapeutic exercises to develop strength, endurance, ROM, flexibility, posture, and core stabilization for 35 minutes including:    Treadmill x5'  1/2 kneel hip flexor stretch 2x30"  Sidesteps GTB 2x25'  Monster walks GTB 2x25' fwd/bwd   EIL 2x10  Bridge on ball 2x10x3"  dead bugs 3x5 ea with ball      Peripheral muscle strengthening which included one set of 15-20 repetitions at a slow and controlled 10-13 second per rep pace focused on strengthening supporting musculature in order to improve body mechanics and functional mobility. Patient and therapist focused on proper form during treatment to ensure optimal strengthening of each targeted muscle group.  Machines utilized included:Torso rotation, Leg Ext, Leg Curl, Chest Press, and Rowing  To be added next visit:Triceps, Biceps, Hip Abd, Hip Add, and Leg Press      Calvin participated in dynamic functional therapeutic activities to improve functional performance and simulate household and community activities for 15  minutes. The following activities were included:    Lifting mechanics  Hip hinge at wall x 15  Deadlift 10KB from floor 2 x 10  Golfers lift 5KB x 10 ea    Calvin received manual therapy techniques for -  minutes. The following activities were included:      Pt given cold pack for 5 minutes to low back in Z-lie.    Patient Education and Home Exercises     Home exercises include:  EIL, DKTC,cat cow, SOC, bridge, bird dog  Cardio program (V5): - 12/6/23  Lifting " education (V11): -  Posture/Lumbar roll: yes  Fridge Magnet Discharge handout (date given): -  Equipment at home/gym membership: yes    Education provided:   - PT role and POC  - HEP    Written Home Exercises Provided: Patient instructed to cont prior HEP.  Exercises were reviewed and Calvin was able to demonstrate them prior to the end of the session.  Calvin demonstrated good  understanding of the education provided.     See EMR under Patient Instructions for exercises provided prior visit.    Assessment   Calvin tolerated session well. Pt presets to session without c/o pain and overall improved tolerance for activities involving prolonged standing. Introduced lifting mechanics with pt demonstrating good performance requiring min verbal cues. MedX was performed at 78ft lbs with 15 reps performed at an exertion rating of 2/10. No issues with peripherals.         Patient is making good progress towards established goals.  Pt will continue to benefit from skilled outpatient physical therapy to address the deficits stated in the impairment chart, provide pt/family education and to maximize pt's level of independence in the home and community environment.     Anticipated Barriers for therapy: none  Pt's spiritual, cultural and educational needs considered and pt agreeable to plan of care and goals as stated below:     GOALS: Pt is in agreement with the following goals.     Short term goals:  6 weeks or 10 visits   - Pt will demonstrate increased lumbar MedX ROM by at least 6 degrees from the initial ROM value with improvements noted in functional ROM and ability to perform ADLs. MET  - Pt will demonstrate increased MedX average isometric strength value by 15% from initial test resulting in improved ability to perform bending, lifting, and carrying activities safely, confidently. MET  - Pt will report a reduction in worst pain score by 1-2 points for improved tolerance for tennis. MET  - Pt able to perform HEP correctly with  minimal cueing or supervision from therapist to encourage independent management of symptoms. MET     Long term goals: 10 weeks or 20 visits   - Pt will demonstrate increased lumbar MedX ROM by at least 9 degrees from initial ROM value, resulting in improved ability to perform functional forward bending while standing and sitting. Appropriate and Ongoing  - Pt will demonstrate increased MedX average isometric strength value by 30% from initial test resulting in improved ability to perform bending, lifting, and carrying activities safely and confidently. MET  - Pt to demonstrate ability to independently control and reduce their pain through posture positioning and mechanical movements throughout a typical day.MET  - Pt will demonstrate reduced pain and improved functional outcomes as reported on the FOTO by reaching an intake score of >/= 66% functional ability in order to demonstrate subjective improvement in patient's condition. . MET  - Pt will demonstrate independence with the HEP at discharge. Appropriate and Ongoing  - Pt will be able to lift 20lb box from floor to plinth and back 10 times with appropriate form and no increased pain. Appropriate and Ongoing    Plan     Continue with established Plan of Care towards established PT goals.     Therapist: Lisbeth Navarrete, PTA  1/9/2024

## 2024-01-09 NOTE — PROGRESS NOTES
"[facility]  Physical Therapy Treatment Note     Name: Calvin Smith  Clinic Number: [id number]    Therapy Diagnosis:   Encounter Diagnoses   Name Primary?    Decreased strength of trunk and back Yes    Decreased range of motion of trunk and back          Physician: Any Thomas MD    Visit Date: 1/4/2024     Physician Orders: PT Eval and Treat  Medical Diagnosis from Referral:   M51.36 (ICD-10-CM) - DDD (degenerative disc disease), lumbar   M43.16 (ICD-10-CM) - Spondylolisthesis of lumbar region   M47.816 (ICD-10-CM) - Lumbar spondylosis      Evaluation Date: 11/10/2023  Authorization Period Expiration: 9/11/2024  Plan of Care Expiration: 1/19/2024  Reassessment Due: 1/28/2023  Visit # / Visits authorized: 11/20  MedX testing visit 1     Time In: 200 PM  Time Out: 305 PM   Total Billable Time: 65 minutes  INSURANCE and OUTCOMES: Fee for Service with FOTO Outcomes 2/3     Precautions: standard     Pattern of pain determined: movement responder/stability     Subjective   Calvin Smith reports he is doing well, working out at gym on days not in [facility]. Pt reports playing golf and getting increased upper back discomfort rather than his normal LB.    Patient reports tolerating previous visit well  Patient reports their pain to be 1/10 on a 0-10 scale with 0 being no pain and 10 being the worst pain imaginable.  Pain Location: bilateral LB     Occupation: Manager [employer], mostly seated   Leisure: Golf, Tennis      Pt goals: "I want to learn how to strengthen my back"   Objective      Lumbar  Isometric Testing on Med X equipment: Testing administered by PT    Lumbar Isometric Testing on Med X equipment: Testing administered by PT     Test Initial Midpoint Final   Date 11/10/2023  12/28/2023     ROM 3-48 deg  0-51 deg     Max Peak Torque 130   222     Min Peak Torque 60   99     Flex/Ext Ratio 2.1:1  2.2:1     % below normative data 60 29     % gain from initial test Not available " "visit 1  81          Outcomes:      Treatment     Calvin received the treatments listed below:      Calvin participated in neuromuscular re-education activities to improve balance, coordination, proprioception, motor control and/or posture for 10 minutes. The following activities were included:        1/9/2024     2:41 PM   [facility]   Visit Number 12   VAS Pain Rating 1   Treadmill Time (in min.) 5 min   Extension in Lying 20   Lumbar Weight 78 lbs   Repetitions 20   Ice - Z Lie (in min.) 5        -Cues to for toes up to decrease LE substitution and maintain hold time into full extension      Straight arm plank cone taps 2x10 ea hand  TA brace with ball 10x5" + SLR 10x3" ea  paloff press in SLS x10 ea way, ea foot  Bird dog + ROW x10, 15#   Kneeling banded core rotation GTB x10 ea , x10 diagonal ea  SLS around the worlds 2x20 CW/CCW 15#        Calvin participated in therapeutic exercises to develop strength, endurance, ROM, flexibility, posture, and core stabilization for 40 minutes including:    Treadmill x5'  1/2 kneel hip flexor stretch 2x30"  Sidesteps GTB 2x25'  Monster walks GTB 2x25' fwd/bwd   EIL 2x10  Bridge on ball 2x10x3"  dead bugs 3x5 ea with ball  Cat cow x10  Seated thoracic stretch over bolster x10    Peripheral muscle strengthening which included one set of 15-20 repetitions at a slow and controlled 10-13 second per rep pace focused on strengthening supporting musculature in order to improve body mechanics and functional mobility. Patient and therapist focused on proper form during treatment to ensure optimal strengthening of each targeted muscle group.  Machines utilized included:Torso rotation, Leg Ext, Leg Curl, Chest Press, and Rowing  To be added next visit:Triceps, Biceps, Hip Abd, Hip Add, and Leg Press      Calvin participated in dynamic functional therapeutic activities to improve functional performance and simulate household and community activities for 15  minutes. The following activities " were included:    Lifting mechanics  Hip hinge at wall x 15  Deadlift 10KB from floor 2 x 10  Golfers lift 5KB x 10 ea    Calvin received manual therapy techniques for -  minutes. The following activities were included:      Pt given cold pack for 5 minutes to low back in Z-lie.    Patient Education and Home Exercises     Home exercises include:  EIL, DKTC,cat cow, SOC, bridge, bird dog  Cardio program (V5): - 12/6/23  Lifting education (V11): -  Posture/Lumbar roll: yes  Fridge Magnet Discharge handout (date given): -  Equipment at home/gym membership: yes    Education provided:   - PT role and POC  - HEP    Written Home Exercises Provided: Patient instructed to cont prior HEP.  Exercises were reviewed and Calvin was able to demonstrate them prior to the end of the session.  Calvin demonstrated good  understanding of the education provided.     See EMR under Patient Instructions for exercises provided prior visit.    Assessment   Calvin tolerated session well. Pt presets to session with min discomfort in upper back which was resolved with thoracic stretch over back of chair. Pt cont with previous ex and stretches with good form and min cues.Lumbar MedX was performed at 78ft lbs with 20 reps performed at an exertion rating of 3/10. No issues with peripherals. Progress per HB protocol and pt's tolerance.       Patient is making good progress towards established goals.  Pt will continue to benefit from skilled outpatient physical therapy to address the deficits stated in the impairment chart, provide pt/family education and to maximize pt's level of independence in the home and community environment.     Anticipated Barriers for therapy: none  Pt's spiritual, cultural and educational needs considered and pt agreeable to plan of care and goals as stated below:     GOALS: Pt is in agreement with the following goals.     Short term goals:  6 weeks or 10 visits   - Pt will demonstrate increased lumbar MedX ROM by at least 6 degrees  from the initial ROM value with improvements noted in functional ROM and ability to perform ADLs. MET  - Pt will demonstrate increased MedX average isometric strength value by 15% from initial test resulting in improved ability to perform bending, lifting, and carrying activities safely, confidently. MET  - Pt will report a reduction in worst pain score by 1-2 points for improved tolerance for tennis. MET  - Pt able to perform HEP correctly with minimal cueing or supervision from therapist to encourage independent management of symptoms. MET     Long term goals: 10 weeks or 20 visits   - Pt will demonstrate increased lumbar MedX ROM by at least 9 degrees from initial ROM value, resulting in improved ability to perform functional forward bending while standing and sitting. Appropriate and Ongoing  - Pt will demonstrate increased MedX average isometric strength value by 30% from initial test resulting in improved ability to perform bending, lifting, and carrying activities safely and confidently. MET  - Pt to demonstrate ability to independently control and reduce their pain through posture positioning and mechanical movements throughout a typical day.MET  - Pt will demonstrate reduced pain and improved functional outcomes as reported on the FOTO by reaching an intake score of >/= 66% functional ability in order to demonstrate subjective improvement in patient's condition. . MET  - Pt will demonstrate independence with the HEP at discharge. Appropriate and Ongoing  - Pt will be able to lift 20lb box from floor to plinth and back 10 times with appropriate form and no increased pain. Appropriate and Ongoing    Plan     Continue with established Plan of Care towards established PT goals.     Therapist: Huber Romero, ELMA  1/4/2024

## 2024-01-16 ENCOUNTER — CLINICAL SUPPORT (OUTPATIENT)
Dept: REHABILITATION | Facility: OTHER | Age: 59
End: 2024-01-16
Payer: COMMERCIAL

## 2024-01-16 DIAGNOSIS — M47.816 LUMBAR SPONDYLOSIS: ICD-10-CM

## 2024-01-16 DIAGNOSIS — M51.36 DDD (DEGENERATIVE DISC DISEASE), LUMBAR: ICD-10-CM

## 2024-01-16 DIAGNOSIS — Z78.9 IMPAIRED MOBILITY AND ADLS: ICD-10-CM

## 2024-01-16 DIAGNOSIS — R29.898 DECREASED STRENGTH OF TRUNK AND BACK: Primary | ICD-10-CM

## 2024-01-16 DIAGNOSIS — M43.16 SPONDYLOLISTHESIS OF LUMBAR REGION: ICD-10-CM

## 2024-01-16 DIAGNOSIS — M25.69 DECREASED RANGE OF MOTION OF TRUNK AND BACK: ICD-10-CM

## 2024-01-16 DIAGNOSIS — Z74.09 IMPAIRED MOBILITY AND ADLS: ICD-10-CM

## 2024-01-16 PROCEDURE — 97110 THERAPEUTIC EXERCISES: CPT | Mod: CQ

## 2024-01-16 PROCEDURE — 97112 NEUROMUSCULAR REEDUCATION: CPT | Mod: CQ

## 2024-01-16 NOTE — PROGRESS NOTES
"  [facility]  Physical Therapy Treatment Note     Name: Calvin Smith  Clinic Number: [id number]    Therapy Diagnosis:   Encounter Diagnoses   Name Primary?    Decreased strength of trunk and back Yes    Decreased range of motion of trunk and back          Physician: Any Thomas MD    Visit Date: 1/4/2024     Physician Orders: PT Eval and Treat  Medical Diagnosis from Referral:   M51.36 (ICD-10-CM) - DDD (degenerative disc disease), lumbar   M43.16 (ICD-10-CM) - Spondylolisthesis of lumbar region   M47.816 (ICD-10-CM) - Lumbar spondylosis      Evaluation Date: 11/10/2023  Authorization Period Expiration: 9/11/2024  Plan of Care Expiration: 1/19/2024  Reassessment Due: 1/28/2023  Visit # / Visits authorized: 13/20  MedX testing visit 1     Time In: 1000 AM  Time Out: 1100 AM  Total Billable Time: 60 minutes  INSURANCE and OUTCOMES: Fee for Service with FOTO Outcomes 2/3     Precautions: standard     Pattern of pain determined: movement responder/stability     Subjective   Calvin Smith reports he is doing well. He golfed 18 holes the other day without issue. He has also been going to the gym as well.     Patient reports tolerating previous visit well  Patient reports their pain to be 0/10 on a 0-10 scale with 0 being no pain and 10 being the worst pain imaginable.  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   Leisure: Golf, Tennis      Pt goals: "I want to learn how to strengthen my back"   Objective      Lumbar  Isometric Testing on Med X equipment: Testing administered by PT    Lumbar Isometric Testing on Med X equipment: Testing administered by PT     Test Initial Midpoint Final   Date 11/10/2023  12/28/2023     ROM 3-48 deg  0-51 deg     Max Peak Torque 130   222     Min Peak Torque 60   99     Flex/Ext Ratio 2.1:1  2.2:1     % below normative data 60 29     % gain from initial test Not available visit 1  81      " "    Outcomes:      Treatment     Calvin received the treatments listed below:      Calvin participated in neuromuscular re-education activities to improve balance, coordination, proprioception, motor control and/or posture for 15 minutes. The following activities were included:          1/16/2024     9:55 AM   [facility]   Visit Number 13   VAS Pain Rating 0   Treadmill Time (in min.) 5 min   Extension in Lying 20   Lumbar Weight 81 lbs   Repetitions 16   Rating of Perceived Exertion 3   Ice - Z Lie (in min.) 5          -Cues to for toes up to decrease LE substitution and maintain hold time into full extension      +KB march 2 x 10 (15kb and 20KB)  Straight arm plank cone taps 2x10 ea hand  TA brace with ball 10x5" + SLR 10x3" ea  paloff press in SLS x10 ea way, ea foot  Bird dog + ROW x10, 15#   Kneeling banded core rotation GTB x10 ea , x10 diagonal ea  SLS around the worlds 2x20 CW/CCW 15#        Calvin participated in therapeutic exercises to develop strength, endurance, ROM, flexibility, posture, and core stabilization for 45 minutes including:    Treadmill x5'    Sidesteps +BTB 2x25'  Monster walks +BTB 2x25' fwd/bwd   EIL 2x10  Bridge on ball w/ HS curl 2x10x3"  dead bugs 3x5 ea with ball  Cat cow x 10  +Childspose --> Cobra x 10  +SL deadlift (various resistance though better performance with no wt)  Seated thoracic stretch over bolster x10      Peripheral muscle strengthening which included one set of 15-20 repetitions at a slow and controlled 10-13 second per rep pace focused on strengthening supporting musculature in order to improve body mechanics and functional mobility. Patient and therapist focused on proper form during treatment to ensure optimal strengthening of each targeted muscle group.  Machines utilized included:Torso rotation, Leg Ext, Leg Curl, Chest Press, and Rowing  To be added next visit:Triceps, Biceps, Hip Abd, Hip Add, and Leg Press      Calvin participated in dynamic functional " therapeutic activities to improve functional performance and simulate household and community activities for 00  minutes. The following activities were included:    Lifting mechanics  Hip hinge at wall x 15  Deadlift 10KB from floor 2 x 10  Golfers lift 5KB x 10 ea    Calvin received manual therapy techniques for -  minutes. The following activities were included:      Pt given cold pack for 5 minutes to low back in Z-lie.    Patient Education and Home Exercises     Home exercises include:  EIL, DKTC,cat cow, SOC, bridge, bird dog  Cardio program (V5): - 12/6/23  Lifting education (V11): -  Posture/Lumbar roll: yes  Fridge Magnet Discharge handout (date given): -  Equipment at home/gym membership: yes    Education provided:   - PT role and POC  - HEP    Written Home Exercises Provided: Patient instructed to cont prior HEP.  Exercises were reviewed and Calvin was able to demonstrate them prior to the end of the session.  Calvin demonstrated good  understanding of the education provided.     See EMR under Patient Instructions for exercises provided prior visit.    Assessment   Calvin tolerated session well. Pt continues to report to session with decreased pain and improved tolerance for PA. Continued emphasis on dynamic core stability and posterior chain strengthening. MedX was performed at 81ft lbs with 16 reps performed at an exertion rating of 3/10. No issues with peripherals. Progress as tolerated.         Patient is making good progress towards established goals.  Pt will continue to benefit from skilled outpatient physical therapy to address the deficits stated in the impairment chart, provide pt/family education and to maximize pt's level of independence in the home and community environment.     Anticipated Barriers for therapy: none  Pt's spiritual, cultural and educational needs considered and pt agreeable to plan of care and goals as stated below:     GOALS: Pt is in agreement with the following goals.     Short term  goals:  6 weeks or 10 visits   - Pt will demonstrate increased lumbar MedX ROM by at least 6 degrees from the initial ROM value with improvements noted in functional ROM and ability to perform ADLs. MET  - Pt will demonstrate increased MedX average isometric strength value by 15% from initial test resulting in improved ability to perform bending, lifting, and carrying activities safely, confidently. MET  - Pt will report a reduction in worst pain score by 1-2 points for improved tolerance for tennis. MET  - Pt able to perform HEP correctly with minimal cueing or supervision from therapist to encourage independent management of symptoms. MET     Long term goals: 10 weeks or 20 visits   - Pt will demonstrate increased lumbar MedX ROM by at least 9 degrees from initial ROM value, resulting in improved ability to perform functional forward bending while standing and sitting. Appropriate and Ongoing  - Pt will demonstrate increased MedX average isometric strength value by 30% from initial test resulting in improved ability to perform bending, lifting, and carrying activities safely and confidently. MET  - Pt to demonstrate ability to independently control and reduce their pain through posture positioning and mechanical movements throughout a typical day.MET  - Pt will demonstrate reduced pain and improved functional outcomes as reported on the FOTO by reaching an intake score of >/= 66% functional ability in order to demonstrate subjective improvement in patient's condition. . MET  - Pt will demonstrate independence with the HEP at discharge. Appropriate and Ongoing  - Pt will be able to lift 20lb box from floor to plinth and back 10 times with appropriate form and no increased pain. Appropriate and Ongoing    Plan     Continue with established Plan of Care towards established PT goals.     Therapist: Huber Romero, PTA  1/16/24

## 2024-01-19 ENCOUNTER — CLINICAL SUPPORT (OUTPATIENT)
Dept: REHABILITATION | Facility: OTHER | Age: 59
End: 2024-01-19
Payer: COMMERCIAL

## 2024-01-19 DIAGNOSIS — R29.898 DECREASED STRENGTH OF TRUNK AND BACK: Primary | ICD-10-CM

## 2024-01-19 DIAGNOSIS — M25.69 DECREASED RANGE OF MOTION OF TRUNK AND BACK: ICD-10-CM

## 2024-01-19 PROCEDURE — 97110 THERAPEUTIC EXERCISES: CPT

## 2024-01-19 PROCEDURE — 97112 NEUROMUSCULAR REEDUCATION: CPT

## 2024-01-19 NOTE — PROGRESS NOTES
"  [facility]  Physical Therapy Treatment Note     Name: Calvin Smith  Clinic Number: [id number]    Therapy Diagnosis:   Encounter Diagnoses   Name Primary?    Decreased strength of trunk and back Yes    Decreased range of motion of trunk and back      Physician: Any Thomas MD    Visit Date: 1/19/2024     Physician Orders: PT Eval and Treat  Medical Diagnosis from Referral:   M51.36 (ICD-10-CM) - DDD (degenerative disc disease), lumbar   M43.16 (ICD-10-CM) - Spondylolisthesis of lumbar region   M47.816 (ICD-10-CM) - Lumbar spondylosis      Evaluation Date: 11/10/2023  Authorization Period Expiration: 9/11/2024  Plan of Care Expiration: 1/19/2024  Reassessment Due: 1/28/2023  Visit # / Visits authorized: 14/20  MedX testing visit 1     Time In: 1:30 pm  Time Out: 2:40 pm  Total Billable Time: 70 minutes  INSURANCE and OUTCOMES: Fee for Service with FOTO Outcomes 2/3     Precautions: standard     Pattern of pain determined: movement responder/stability     Subjective   Calvin Smith reports he is doing well and really liked the exercises he did last session and would definitely like to do the single leg deadlifts again.    Patient reports tolerating previous visit well  Patient reports their pain to be 0/10 on a 0-10 scale with 0 being no pain and 10 being the worst pain imaginable.  Pain Location: bilateral LB     Occupation: Manager [employer], mostly seated   Leisure: Golf, Tennis      Pt goals: "I want to learn how to strengthen my back"   Objective      Lumbar  Isometric Testing on Med X equipment: Testing administered by PT    Lumbar Isometric Testing on Med X equipment: Testing administered by PT     Test Initial Midpoint Final   Date 11/10/2023  12/28/2023     ROM 3-48 deg  0-51 deg     Max Peak Torque 130   222     Min Peak Torque 60   99     Flex/Ext Ratio 2.1:1  2.2:1     % below normative data 60 29     % gain from initial test Not available visit 1  81      " "    Outcomes:      Treatment     Calvin received the treatments listed below:      Calvin participated in neuromuscular re-education activities to improve balance, coordination, proprioception, motor control and/or posture for 15 minutes. The following activities were included:        1/19/2024     2:51 PM   [facility]   Visit Number 14   VAS Pain Rating 0   Treadmill Time (in min.) 5 min   Extension in Lying 20   Lumbar Weight 81 lbs   Repetitions 20   Rating of Perceived Exertion 3   Ice - Z Lie (in min.) 5       -Cues to for toes up to decrease LE substitution and maintain hold time into full extension    +KB march 2 x 10 (15kb and 20KB)  Bird dog with knee crunch 2 x 10 each side    Calvin participated in therapeutic exercises to develop strength, endurance, ROM, flexibility, posture, and core stabilization for 60 minutes including:    Treadmill x5'  Sidesteps +BTB 2x25'  Monster walks +BTB 2x25' fwd/bwd   EIL 2x10  Bridge on ball w/ HS curl 2x10x3"  Cat cow x 10  SL deadlift 10# (to chair) 2 x 10 and 2 x 10 reps SL deadlift no weight to stool  Thread the needle x 10 reps each side    Peripheral muscle strengthening which included one set of 15-20 repetitions at a slow and controlled 10-13 second per rep pace focused on strengthening supporting musculature in order to improve body mechanics and functional mobility. Patient and therapist focused on proper form during treatment to ensure optimal strengthening of each targeted muscle group.  Machines utilized included:Torso rotation, Leg Ext, Leg Curl, Chest Press, and Rowing  To be added next visit:Triceps, Biceps, Hip Abd, Hip Add, and Leg Press    Calvin participated in dynamic functional therapeutic activities to improve functional performance and simulate household and community activities for 00  minutes. The following activities were included:    Lifting mechanics  Hip hinge at wall x 15  Deadlift 10KB from floor 2 x 10  Golfers lift 5KB x 10 ea    Calvin received " manual therapy techniques for 0 minutes. The following activities were included:    Pt given cold pack for 5 minutes to low back in Z-lie.    Patient Education and Home Exercises     Home exercises include:  EIL, DKTC,cat cow, SOC, bridge, bird dog  Cardio program (V5): - 12/6/23  Lifting education (V11): -  Posture/Lumbar roll: yes  Fridge Magnet Discharge handout (date given): -  Equipment at home/gym membership: yes    Education provided:   - PT role and POC  - HEP    Written Home Exercises Provided: Patient instructed to cont prior HEP.  Exercises were reviewed and Calvin was able to demonstrate them prior to the end of the session.  Calvin demonstrated good  understanding of the education provided.     See EMR under Patient Instructions for exercises provided prior visit.    Assessment   Calvin demonstrated good tolerance to strengthening and core control with improvement in control after repetitions. Still struggling with hip hinge, but improved since last session. No issues with peripherals this session.    Patient is making good progress towards established goals.  Pt will continue to benefit from skilled outpatient physical therapy to address the deficits stated in the impairment chart, provide pt/family education and to maximize pt's level of independence in the home and community environment.     Anticipated Barriers for therapy: none  Pt's spiritual, cultural and educational needs considered and pt agreeable to plan of care and goals as stated below:     GOALS: Pt is in agreement with the following goals.     Short term goals:  6 weeks or 10 visits   - Pt will demonstrate increased lumbar MedX ROM by at least 6 degrees from the initial ROM value with improvements noted in functional ROM and ability to perform ADLs. MET  - Pt will demonstrate increased MedX average isometric strength value by 15% from initial test resulting in improved ability to perform bending, lifting, and carrying activities safely,  confidently. MET  - Pt will report a reduction in worst pain score by 1-2 points for improved tolerance for tennis. MET  - Pt able to perform HEP correctly with minimal cueing or supervision from therapist to encourage independent management of symptoms. MET     Long term goals: 10 weeks or 20 visits   - Pt will demonstrate increased lumbar MedX ROM by at least 9 degrees from initial ROM value, resulting in improved ability to perform functional forward bending while standing and sitting. Appropriate and Ongoing  - Pt will demonstrate increased MedX average isometric strength value by 30% from initial test resulting in improved ability to perform bending, lifting, and carrying activities safely and confidently. MET  - Pt to demonstrate ability to independently control and reduce their pain through posture positioning and mechanical movements throughout a typical day.MET  - Pt will demonstrate reduced pain and improved functional outcomes as reported on the FOTO by reaching an intake score of >/= 66% functional ability in order to demonstrate subjective improvement in patient's condition. . MET  - Pt will demonstrate independence with the HEP at discharge. Appropriate and Ongoing  - Pt will be able to lift 20lb box from floor to plinth and back 10 times with appropriate form and no increased pain. Appropriate and Ongoing    Plan     Continue with established Plan of Care towards established PT goals.     Therapist: Yulia Mosley, PT, DPT, FAAOMPT  1/19/24

## 2024-01-26 ENCOUNTER — CLINICAL SUPPORT (OUTPATIENT)
Dept: REHABILITATION | Facility: OTHER | Age: 59
End: 2024-01-26
Payer: COMMERCIAL

## 2024-01-26 DIAGNOSIS — M25.69 DECREASED RANGE OF MOTION OF TRUNK AND BACK: ICD-10-CM

## 2024-01-26 DIAGNOSIS — R29.898 DECREASED STRENGTH OF TRUNK AND BACK: Primary | ICD-10-CM

## 2024-01-26 PROCEDURE — 97110 THERAPEUTIC EXERCISES: CPT

## 2024-01-26 PROCEDURE — 97112 NEUROMUSCULAR REEDUCATION: CPT

## 2024-01-26 NOTE — PROGRESS NOTES
"    [facility]  Physical Therapy Treatment/discharge Note     Name: Calvin Smith  Clinic Number: [id number]    Therapy Diagnosis:   Encounter Diagnoses   Name Primary?    Decreased strength of trunk and back Yes    Decreased range of motion of trunk and back      Physician: Any Thomas MD    Visit Date: 1/26/2024     Physician Orders: PT Eval and Treat  Medical Diagnosis from Referral:   M51.36 (ICD-10-CM) - DDD (degenerative disc disease), lumbar   M43.16 (ICD-10-CM) - Spondylolisthesis of lumbar region   M47.816 (ICD-10-CM) - Lumbar spondylosis      Evaluation Date: 11/10/2023  Authorization Period Expiration: 9/11/2024  Plan of Care Expiration: 1/29/2024  Reassessment Due: 1/28/2023  Visit # / Visits authorized: 14/20  MedX testing visit 1     Time In: 1:30 pm  Time Out: 2:30 pm  Total Billable Time: 60 minutes  INSURANCE and OUTCOMES: Fee for Service with FOTO Outcomes 2/3     Precautions: standard     Pattern of pain determined: movement responder/stability     Subjective   Calvin Smith reports he is pleased with his progress and feels ready to be discharged. He is not having pain and has transitioned to working out at the gym multiple times a week     Patient reports tolerating previous visit well  Patient reports their pain to be 0/10 on a 0-10 scale with 0 being no pain and 10 being the worst pain imaginable.  Pain Location: bilateral LB     Occupation: Manager [employer], mostly seated   Leisure: Golf, Tennis      Pt goals: "I want to learn how to strengthen my back"   Objective      Lumbar  Isometric Testing on Med X equipment: Testing administered by PT    Lumbar Isometric Testing on Med X equipment: Testing administered by PT     Test Initial Midpoint Final   Date 11/10/2023  12/28/2023  1/26/2024   ROM 3-48 deg  0-51 deg  0-54   Max Peak Torque 130   222  251   Min Peak Torque 60   99  73   Flex/Ext Ratio 2.1:1  2.2:1  3.2:1   % below normative data 60 29  17 " "  % gain from initial test Not available visit 1  81  132%        Outcomes:      Treatment     Calvin received the treatments listed below:      Calvin participated in neuromuscular re-education activities to improve balance, coordination, proprioception, motor control and/or posture for 10 minutes. The following activities were included:        1/31/2024     9:15 AM   [facility]   Visit Number 15   VAS Pain Rating 0   Treadmill Time (in min.) 5 min   Extension in Lying 10   Lumbar Flexion 54   Lumbar Extension 0   Lumbar Peak Torque 251 ft. lbs.   Lumbar Weight 60 lbs   Repetitions 5        -Cues to for toes up to decrease LE substitution and maintain hold time into full extension    +KB march 2 x 10 (15kb and 20KB)  Bird dog with knee crunch 2 x 10 each side    Calvin participated in therapeutic exercises to develop strength, endurance, ROM, flexibility, posture, and core stabilization for 45 minutes including:    Treadmill x5'  Sidesteps +BTB 2x25'  Monster walks +BTB 2x25' fwd/bwd   EIL 2x10  Bridge on ball w/ HS curl 2x10x3"  Cat cow x 10  SL deadlift 10# (to chair) 2 x 10 and 2 x 10 reps SL deadlift no weight to stool  Thread the needle x 10 reps each side    Peripheral muscle strengthening which included one set of 15-20 repetitions at a slow and controlled 10-13 second per rep pace focused on strengthening supporting musculature in order to improve body mechanics and functional mobility. Patient and therapist focused on proper form during treatment to ensure optimal strengthening of each targeted muscle group.  Machines utilized included:Torso rotation, Leg Ext, Leg Curl, Chest Press, and Rowing  To be added next visit:Triceps, Biceps, Hip Abd, Hip Add, and Leg Press    Calvin participated in dynamic functional therapeutic activities to improve functional performance and simulate household and community activities for 00  minutes. The following activities were included:    Lifting mechanics  Hip hinge " at wall x 15  Deadlift 10KB from floor 2 x 10  Golfers lift 5KB x 10 ea    Calvin received manual therapy techniques for 0 minutes. The following activities were included:    Pt given cold pack for 5 minutes to low back in Z-lie.    Patient Education and Home Exercises     Home exercises include:  EIL, DKTC,cat cow, SOC, bridge, bird dog  Cardio program (V5): - 12/6/23  Lifting education (V11): -  Posture/Lumbar roll: yes  [facility] (date given[facility] -  Equipment at home/gym membership: yes    Education provided:   - PT role and POC  - HEP    Written Home Exercises Provided: Patient instructed to cont prior HEP.  Exercises were reviewed and Calvin was able to demonstrate them prior to the end of the session.  Calvin demonstrated good  understanding of the education provided.     See EMR under Patient Instructions for exercises provided prior visit.    Assessment   Patient has attended 20 visits of the [facility] program focusing aerobic training, isometric testing with dynamic strengthening on MedX machine for spine, whole body strengthening on peripheral strengthening equipment, HEP, and patient education. Patient has completed the [facility] Program and is ready to be transitioned into wellness program. Educated on the importance of wellness program and attending weekly in order to maintain strength. Stressed the importance of continuing exercise and maintaining proper body mechanics and ergonomics in order to fully participate in activities of daily living, work, and leisure activities. At this time, patient demonstrates improvement in ability to reduce symptoms, improved posture, improved spinal ROM and improved strength. Discharge handout with HEP given and reviewed all information given. Patient able to demonstrate and verbalize understanding. Patient does not plan to attend wellness and is appropriate for transition.     -Improved posture and currently using lumbar roll.  -Improved lumbar ROM,  initially on MedX test 3-48 deg and currently 0-54 deg.  -Improved strength at each test point on lumbar med ex IM test with 132% average improvement with reduced pain noted by patient.  -Initial outcome tool score 62% and current outcome tool score 94% indicating reduced pain and improved function.            Patient is making good progress towards established goals.  Pt will continue to benefit from skilled outpatient physical therapy to address the deficits stated in the impairment chart, provide pt/family education and to maximize pt's level of independence in the home and community environment.     Anticipated Barriers for therapy: none  Pt's spiritual, cultural and educational needs considered and pt agreeable to plan of care and goals as stated below:     GOALS: Pt is in agreement with the following goals.     Short term goals:  6 weeks or 10 visits   - Pt will demonstrate increased lumbar MedX ROM by at least 6 degrees from the initial ROM value with improvements noted in functional ROM and ability to perform ADLs. MET  - Pt will demonstrate increased MedX average isometric strength value by 15% from initial test resulting in improved ability to perform bending, lifting, and carrying activities safely, confidently. MET  - Pt will report a reduction in worst pain score by 1-2 points for improved tolerance for tennis. MET  - Pt able to perform HEP correctly with minimal cueing or supervision from therapist to encourage independent management of symptoms. MET     Long term goals: 10 weeks or 20 visits   - Pt will demonstrate increased lumbar MedX ROM by at least 9 degrees from initial ROM value, resulting in improved ability to perform functional forward bending while standing and sitting. Appropriate and Ongoing  - Pt will demonstrate increased MedX average isometric strength value by 30% from initial test resulting in improved ability to perform bending, lifting, and carrying activities safely and  confidently. MET  - Pt to demonstrate ability to independently control and reduce their pain through posture positioning and mechanical movements throughout a typical day.MET  - Pt will demonstrate reduced pain and improved functional outcomes as reported on the FOTO by reaching an intake score of >/= 66% functional ability in order to demonstrate subjective improvement in patient's condition. . MET  - Pt will demonstrate independence with the HEP at discharge. MET  - Pt will be able to lift 20lb box from floor to plinth and back 10 times with appropriate form and no increased pain. MET    Plan     Continue with established Plan of Care towards established PT goals.     Therapist: Arslan Harris, PT, DPT  1/19/24

## 2024-02-09 ENCOUNTER — LAB VISIT (OUTPATIENT)
Dept: LAB | Facility: HOSPITAL | Age: 59
End: 2024-02-09
Attending: FAMILY MEDICINE
Payer: COMMERCIAL

## 2024-02-09 DIAGNOSIS — Z00.00 WELL ADULT EXAM: ICD-10-CM

## 2024-02-09 LAB
ALBUMIN SERPL BCP-MCNC: 4.2 G/DL (ref 3.5–5.2)
ALP SERPL-CCNC: 73 U/L (ref 55–135)
ALT SERPL W/O P-5'-P-CCNC: 39 U/L (ref 10–44)
ANION GAP SERPL CALC-SCNC: 10 MMOL/L (ref 8–16)
AST SERPL-CCNC: 26 U/L (ref 10–40)
BASOPHILS # BLD AUTO: 0.04 K/UL (ref 0–0.2)
BASOPHILS NFR BLD: 0.6 % (ref 0–1.9)
BILIRUB SERPL-MCNC: 0.6 MG/DL (ref 0.1–1)
BUN SERPL-MCNC: 17 MG/DL (ref 6–20)
CALCIUM SERPL-MCNC: 9.3 MG/DL (ref 8.7–10.5)
CHLORIDE SERPL-SCNC: 111 MMOL/L (ref 95–110)
CHOLEST SERPL-MCNC: 115 MG/DL (ref 120–199)
CHOLEST/HDLC SERPL: 3.6 {RATIO} (ref 2–5)
CO2 SERPL-SCNC: 20 MMOL/L (ref 23–29)
COMPLEXED PSA SERPL-MCNC: 1.4 NG/ML (ref 0–4)
CREAT SERPL-MCNC: 1 MG/DL (ref 0.5–1.4)
DIFFERENTIAL METHOD BLD: NORMAL
EOSINOPHIL # BLD AUTO: 0.3 K/UL (ref 0–0.5)
EOSINOPHIL NFR BLD: 3.9 % (ref 0–8)
ERYTHROCYTE [DISTWIDTH] IN BLOOD BY AUTOMATED COUNT: 12.4 % (ref 11.5–14.5)
EST. GFR  (NO RACE VARIABLE): >60 ML/MIN/1.73 M^2
ESTIMATED AVG GLUCOSE: 117 MG/DL (ref 68–131)
GLUCOSE SERPL-MCNC: 103 MG/DL (ref 70–110)
HBA1C MFR BLD: 5.7 % (ref 4–5.6)
HCT VFR BLD AUTO: 48.1 % (ref 40–54)
HDLC SERPL-MCNC: 32 MG/DL (ref 40–75)
HDLC SERPL: 27.8 % (ref 20–50)
HGB BLD-MCNC: 15.6 G/DL (ref 14–18)
IMM GRANULOCYTES # BLD AUTO: 0.03 K/UL (ref 0–0.04)
IMM GRANULOCYTES NFR BLD AUTO: 0.4 % (ref 0–0.5)
LDLC SERPL CALC-MCNC: 56.8 MG/DL (ref 63–159)
LYMPHOCYTES # BLD AUTO: 2.1 K/UL (ref 1–4.8)
LYMPHOCYTES NFR BLD: 29.9 % (ref 18–48)
MCH RBC QN AUTO: 28.7 PG (ref 27–31)
MCHC RBC AUTO-ENTMCNC: 32.4 G/DL (ref 32–36)
MCV RBC AUTO: 88 FL (ref 82–98)
MONOCYTES # BLD AUTO: 0.5 K/UL (ref 0.3–1)
MONOCYTES NFR BLD: 7.4 % (ref 4–15)
NEUTROPHILS # BLD AUTO: 4 K/UL (ref 1.8–7.7)
NEUTROPHILS NFR BLD: 57.8 % (ref 38–73)
NONHDLC SERPL-MCNC: 83 MG/DL
NRBC BLD-RTO: 0 /100 WBC
PLATELET # BLD AUTO: 337 K/UL (ref 150–450)
PMV BLD AUTO: 11.3 FL (ref 9.2–12.9)
POTASSIUM SERPL-SCNC: 4.2 MMOL/L (ref 3.5–5.1)
PROT SERPL-MCNC: 7.2 G/DL (ref 6–8.4)
RBC # BLD AUTO: 5.44 M/UL (ref 4.6–6.2)
SODIUM SERPL-SCNC: 141 MMOL/L (ref 136–145)
T4 FREE SERPL-MCNC: 0.8 NG/DL (ref 0.71–1.51)
TRIGL SERPL-MCNC: 131 MG/DL (ref 30–150)
TSH SERPL DL<=0.005 MIU/L-ACNC: 1.69 UIU/ML (ref 0.4–4)
WBC # BLD AUTO: 6.93 K/UL (ref 3.9–12.7)

## 2024-02-09 PROCEDURE — 84443 ASSAY THYROID STIM HORMONE: CPT | Performed by: FAMILY MEDICINE

## 2024-02-09 PROCEDURE — 85025 COMPLETE CBC W/AUTO DIFF WBC: CPT | Performed by: FAMILY MEDICINE

## 2024-02-09 PROCEDURE — 80061 LIPID PANEL: CPT | Performed by: FAMILY MEDICINE

## 2024-02-09 PROCEDURE — 84439 ASSAY OF FREE THYROXINE: CPT | Performed by: FAMILY MEDICINE

## 2024-02-09 PROCEDURE — 36415 COLL VENOUS BLD VENIPUNCTURE: CPT | Mod: PO | Performed by: FAMILY MEDICINE

## 2024-02-09 PROCEDURE — 80053 COMPREHEN METABOLIC PANEL: CPT | Performed by: FAMILY MEDICINE

## 2024-02-09 PROCEDURE — 83036 HEMOGLOBIN GLYCOSYLATED A1C: CPT | Performed by: FAMILY MEDICINE

## 2024-02-09 PROCEDURE — 84153 ASSAY OF PSA TOTAL: CPT | Performed by: FAMILY MEDICINE

## 2024-02-16 ENCOUNTER — OFFICE VISIT (OUTPATIENT)
Dept: INTERNAL MEDICINE | Facility: CLINIC | Age: 59
End: 2024-02-16
Payer: COMMERCIAL

## 2024-02-16 VITALS
SYSTOLIC BLOOD PRESSURE: 120 MMHG | DIASTOLIC BLOOD PRESSURE: 72 MMHG | RESPIRATION RATE: 16 BRPM | OXYGEN SATURATION: 97 % | TEMPERATURE: 97 F | HEIGHT: 70 IN | WEIGHT: 181.19 LBS | BODY MASS INDEX: 25.94 KG/M2 | HEART RATE: 81 BPM

## 2024-02-16 DIAGNOSIS — F41.9 ANXIETY: ICD-10-CM

## 2024-02-16 DIAGNOSIS — M48.061 NEUROFORAMINAL STENOSIS OF LUMBAR SPINE: ICD-10-CM

## 2024-02-16 DIAGNOSIS — Z00.00 WELL ADULT EXAM: Primary | ICD-10-CM

## 2024-02-16 DIAGNOSIS — E78.5 DYSLIPIDEMIA: ICD-10-CM

## 2024-02-16 DIAGNOSIS — R73.03 PREDIABETES: ICD-10-CM

## 2024-02-16 DIAGNOSIS — Z86.73 HISTORY OF TRANSIENT ISCHEMIC ATTACK (TIA): ICD-10-CM

## 2024-02-16 PROCEDURE — 3074F SYST BP LT 130 MM HG: CPT | Mod: CPTII,S$GLB,, | Performed by: FAMILY MEDICINE

## 2024-02-16 PROCEDURE — 3044F HG A1C LEVEL LT 7.0%: CPT | Mod: CPTII,S$GLB,, | Performed by: FAMILY MEDICINE

## 2024-02-16 PROCEDURE — 1160F RVW MEDS BY RX/DR IN RCRD: CPT | Mod: CPTII,S$GLB,, | Performed by: FAMILY MEDICINE

## 2024-02-16 PROCEDURE — 99396 PREV VISIT EST AGE 40-64: CPT | Mod: S$GLB,,, | Performed by: FAMILY MEDICINE

## 2024-02-16 PROCEDURE — 3008F BODY MASS INDEX DOCD: CPT | Mod: CPTII,S$GLB,, | Performed by: FAMILY MEDICINE

## 2024-02-16 PROCEDURE — 3078F DIAST BP <80 MM HG: CPT | Mod: CPTII,S$GLB,, | Performed by: FAMILY MEDICINE

## 2024-02-16 PROCEDURE — 1159F MED LIST DOCD IN RCRD: CPT | Mod: CPTII,S$GLB,, | Performed by: FAMILY MEDICINE

## 2024-02-16 PROCEDURE — 99999 PR PBB SHADOW E&M-EST. PATIENT-LVL IV: CPT | Mod: PBBFAC,,, | Performed by: FAMILY MEDICINE

## 2024-02-16 RX ORDER — METFORMIN HYDROCHLORIDE 500 MG/1
500 TABLET, EXTENDED RELEASE ORAL
Qty: 90 TABLET | Refills: 3 | Status: SHIPPED | OUTPATIENT
Start: 2024-02-16 | End: 2024-03-04

## 2024-02-16 RX ORDER — ATORVASTATIN CALCIUM 10 MG/1
10 TABLET, FILM COATED ORAL DAILY
Qty: 90 TABLET | Refills: 3 | Status: SHIPPED | OUTPATIENT
Start: 2024-02-16 | End: 2024-03-04

## 2024-02-16 RX ORDER — IVERMECTIN 10 MG/G
CREAM TOPICAL
COMMUNITY
Start: 2024-01-08

## 2024-02-16 RX ORDER — SERTRALINE HYDROCHLORIDE 25 MG/1
25 TABLET, FILM COATED ORAL DAILY
Qty: 90 TABLET | Refills: 3 | Status: SHIPPED | OUTPATIENT
Start: 2024-02-16

## 2024-02-16 NOTE — PROGRESS NOTES
Subjective     Patient ID: Calvin Smith is a 58 y.o. male.    Chief Complaint: Annual Exam    HPI 58-year-old white male presents to clinic today for annual physical exam.  He previously had a TIA many years ago and currently remains stable on aspirin 81 mg daily.  Hyperlipidemia is currently well controlled on Lipitor 10 mg daily.  Prediabetes has improved on metformin 500 mg daily with current hemoglobin A1c of 5.7.  Anxiety is stable on Zoloft 25 mg daily.  Sleep apnea remains well controlled with use of CPAP nightly.  He has undergone the healthy back program for treatment of neuroforaminal stenosis of the lumbar spine with stability of symptoms.  He reports a past surgical history of appendectomy, epidural spinal injection, and previous colonoscopy.  He has a family history of his father having leukemia and mother hypothyroidism.  He is up-to-date with all vaccinations and screening exams.  Review of Systems   Constitutional:  Negative for appetite change, chills, fatigue and fever.   HENT:  Negative for nasal congestion, ear pain, hearing loss, postnasal drip, rhinorrhea, sinus pressure/congestion, sore throat and tinnitus.    Eyes:  Negative for redness, itching and visual disturbance.   Respiratory:  Negative for cough, chest tightness and shortness of breath.    Cardiovascular:  Negative for chest pain and palpitations.   Gastrointestinal:  Negative for abdominal pain, constipation, diarrhea, nausea and vomiting.   Genitourinary:  Positive for frequency. Negative for decreased urine volume, difficulty urinating, dysuria, hematuria and urgency.   Musculoskeletal:  Negative for back pain, myalgias, neck pain and neck stiffness.   Integumentary:  Negative for rash.   Neurological:  Negative for dizziness, light-headedness and headaches.   Psychiatric/Behavioral: Negative.            Objective     Physical Exam  Vitals and nursing note reviewed.   Constitutional:       General: He is not in acute distress.      Appearance: Normal appearance. He is well-developed. He is not diaphoretic.   HENT:      Head: Normocephalic and atraumatic.      Right Ear: External ear normal.      Left Ear: External ear normal.      Nose: Nose normal.      Mouth/Throat:      Pharynx: No oropharyngeal exudate.   Eyes:      General: No scleral icterus.        Right eye: No discharge.         Left eye: No discharge.      Conjunctiva/sclera: Conjunctivae normal.      Pupils: Pupils are equal, round, and reactive to light.   Neck:      Thyroid: No thyromegaly.      Vascular: No JVD.      Trachea: No tracheal deviation.   Cardiovascular:      Rate and Rhythm: Normal rate and regular rhythm.      Heart sounds: Normal heart sounds. No murmur heard.     No friction rub. No gallop.   Pulmonary:      Effort: Pulmonary effort is normal. No respiratory distress.      Breath sounds: Normal breath sounds. No stridor. No wheezing, rhonchi or rales.   Chest:      Chest wall: No tenderness.   Abdominal:      General: Bowel sounds are normal. There is no distension.      Palpations: Abdomen is soft. There is no mass.      Tenderness: There is no abdominal tenderness. There is no guarding or rebound.   Musculoskeletal:         General: No tenderness. Normal range of motion.      Cervical back: Normal range of motion and neck supple.   Lymphadenopathy:      Cervical: No cervical adenopathy.   Skin:     General: Skin is warm and dry.      Coloration: Skin is not pale.      Findings: No erythema or rash.   Neurological:      Mental Status: He is alert and oriented to person, place, and time.   Psychiatric:         Mood and Affect: Mood normal.         Behavior: Behavior normal.         Thought Content: Thought content normal.         Judgment: Judgment normal.            Assessment and Plan     1. Well adult exam    2. Prediabetes  -     metFORMIN (GLUCOPHAGE-XR) 500 MG ER 24hr tablet; Take 1 tablet (500 mg total) by mouth daily with breakfast.  Dispense: 90 tablet;  Refill: 3    3. Dyslipidemia  -     atorvastatin (LIPITOR) 10 MG tablet; Take 1 tablet (10 mg total) by mouth once daily.  Dispense: 90 tablet; Refill: 3    4. Anxiety  -     sertraline (ZOLOFT) 25 MG tablet; Take 1 tablet (25 mg total) by mouth once daily.  Dispense: 90 tablet; Refill: 3    5. Neuroforaminal stenosis of lumbar spine        1. Labs have been reviewed and are within normal limits.  2. Continue metformin 500 mg daily.  Prediabetes is currently well controlled.  Current hemoglobin A1c is 5.7.  3. Continue Lipitor 10 mg daily.  Hyperlipidemia is well controlled.  4. Patient had a previous history of TIA and currently remains stable on aspirin 81 mg daily.    5. Continue Zoloft 25 mg daily.  Anxiety is stable.  6. Continue daily exercise and stretches.  Neuroforaminal stenosis of the lumbar spine remains stable.    7. Return to clinic as needed or in 1 year for annual physical exam.               Follow up in about 1 year (around 2/16/2025), or if symptoms worsen or fail to improve, for Annual exam.

## 2024-03-04 DIAGNOSIS — R73.03 PREDIABETES: ICD-10-CM

## 2024-03-04 DIAGNOSIS — E78.5 DYSLIPIDEMIA: ICD-10-CM

## 2024-03-04 RX ORDER — ATORVASTATIN CALCIUM 10 MG/1
10 TABLET, FILM COATED ORAL
Qty: 90 TABLET | Refills: 3 | Status: SHIPPED | OUTPATIENT
Start: 2024-03-04

## 2024-03-04 RX ORDER — METFORMIN HYDROCHLORIDE 500 MG/1
500 TABLET, EXTENDED RELEASE ORAL
Qty: 90 TABLET | Refills: 3 | Status: SHIPPED | OUTPATIENT
Start: 2024-03-04

## 2024-03-04 NOTE — TELEPHONE ENCOUNTER
No care due was identified.  [facility] Embedded Care Due Messages. Reference number: [id number].   3/04/2024 1:17:03 AM CST

## 2024-03-04 NOTE — TELEPHONE ENCOUNTER
Refill Routing Note   Medication(s) are not appropriate for processing by [facility] for the following reason(s):        Drug-disease interaction  Drug-Disease: metFORMIN and Steatosis of liver    [facility] action(s):  Defer  Approve               Appointments  past 12m or future 3m with PCP    Date Provider   Last Visit   2/16/2024 Dc Fitzgerald MD   Next Visit   Visit date not found Dc Fitzgerald MD   ED visits in past 90 days: 0        Note composed:5:14 AM 03/04/2024

## 2024-03-08 RX ORDER — FLUTICASONE PROPIONATE 50 MCG
SPRAY, SUSPENSION (ML) NASAL
Qty: 48 G | Refills: 3 | Status: SHIPPED | OUTPATIENT
Start: 2024-03-08

## 2024-03-08 NOTE — TELEPHONE ENCOUNTER
No care due was identified.  [facility] Embedded Care Due Messages. Reference number: [id number].   3/08/2024 2:49:27 PM CST

## 2024-06-20 ENCOUNTER — OFFICE VISIT (OUTPATIENT)
Dept: SPINE | Facility: CLINIC | Age: 59
End: 2024-06-20
Payer: COMMERCIAL

## 2024-06-20 ENCOUNTER — HOSPITAL ENCOUNTER (OUTPATIENT)
Dept: RADIOLOGY | Facility: OTHER | Age: 59
Discharge: HOME | End: 2024-06-20
Attending: STUDENT IN AN ORGANIZED HEALTH CARE EDUCATION/TRAINING PROGRAM
Payer: COMMERCIAL

## 2024-06-20 ENCOUNTER — TELEPHONE (OUTPATIENT)
Dept: PAIN MEDICINE | Facility: CLINIC | Age: 59
End: 2024-06-20
Payer: COMMERCIAL

## 2024-06-20 VITALS
DIASTOLIC BLOOD PRESSURE: 55 MMHG | BODY MASS INDEX: 25.11 KG/M2 | RESPIRATION RATE: 18 BRPM | OXYGEN SATURATION: 98 % | SYSTOLIC BLOOD PRESSURE: 105 MMHG | WEIGHT: 175 LBS | HEART RATE: 81 BPM | TEMPERATURE: 99 F

## 2024-06-20 DIAGNOSIS — M79.641 RIGHT HAND PAIN: ICD-10-CM

## 2024-06-20 DIAGNOSIS — M51.36 DDD (DEGENERATIVE DISC DISEASE), LUMBAR: Primary | ICD-10-CM

## 2024-06-20 DIAGNOSIS — S49.91XD INJURY OF RIGHT SHOULDER, SUBSEQUENT ENCOUNTER: ICD-10-CM

## 2024-06-20 DIAGNOSIS — M54.16 LUMBAR RADICULOPATHY: Primary | ICD-10-CM

## 2024-06-20 PROCEDURE — 99999 PR PBB SHADOW E&M-EST. PATIENT-LVL IV: CPT | Mod: PBBFAC,,, | Performed by: STUDENT IN AN ORGANIZED HEALTH CARE EDUCATION/TRAINING PROGRAM

## 2024-06-20 PROCEDURE — 73130 X-RAY EXAM OF HAND: CPT | Mod: TC,FY,RT

## 2024-06-20 PROCEDURE — 3078F DIAST BP <80 MM HG: CPT | Mod: CPTII,S$GLB,, | Performed by: STUDENT IN AN ORGANIZED HEALTH CARE EDUCATION/TRAINING PROGRAM

## 2024-06-20 PROCEDURE — 1160F RVW MEDS BY RX/DR IN RCRD: CPT | Mod: CPTII,S$GLB,, | Performed by: STUDENT IN AN ORGANIZED HEALTH CARE EDUCATION/TRAINING PROGRAM

## 2024-06-20 PROCEDURE — 3044F HG A1C LEVEL LT 7.0%: CPT | Mod: CPTII,S$GLB,, | Performed by: STUDENT IN AN ORGANIZED HEALTH CARE EDUCATION/TRAINING PROGRAM

## 2024-06-20 PROCEDURE — 1159F MED LIST DOCD IN RCRD: CPT | Mod: CPTII,S$GLB,, | Performed by: STUDENT IN AN ORGANIZED HEALTH CARE EDUCATION/TRAINING PROGRAM

## 2024-06-20 PROCEDURE — 3008F BODY MASS INDEX DOCD: CPT | Mod: CPTII,S$GLB,, | Performed by: STUDENT IN AN ORGANIZED HEALTH CARE EDUCATION/TRAINING PROGRAM

## 2024-06-20 PROCEDURE — 73130 X-RAY EXAM OF HAND: CPT | Mod: 26,RT,, | Performed by: RADIOLOGY

## 2024-06-20 PROCEDURE — 99214 OFFICE O/P EST MOD 30 MIN: CPT | Mod: S$GLB,,, | Performed by: STUDENT IN AN ORGANIZED HEALTH CARE EDUCATION/TRAINING PROGRAM

## 2024-06-20 PROCEDURE — 3074F SYST BP LT 130 MM HG: CPT | Mod: CPTII,S$GLB,, | Performed by: STUDENT IN AN ORGANIZED HEALTH CARE EDUCATION/TRAINING PROGRAM

## 2024-06-20 PROCEDURE — 73030 X-RAY EXAM OF SHOULDER: CPT | Mod: TC,FY,RT

## 2024-06-20 PROCEDURE — 73030 X-RAY EXAM OF SHOULDER: CPT | Mod: 26,RT,, | Performed by: RADIOLOGY

## 2024-06-20 NOTE — PROGRESS NOTES
Chronic patient Established Note (Follow up visit)      SUBJECTIVE:    Calvin Smith presents to the clinic for a follow-up appointment for chronic pain    Pain Disability Index Review:      10/31/2023     9:23 AM   Last 3 PDI Scores   Pain Disability Index (PDI) 0     INTERVAL HISTORY 6/20/2024:  Ms. Lau presents for chronic back pain. Current Pain level is 9/10.  He reports that he continues to do his home exercises and feels that it does help a little. However, he is noticing diminishing returns. He continues to feel the pain is across his lower back like a band, and is worse with lifting/flexing forward, and at night while lying in bed.  He completed the healthy back program and he has continued maintaining the exercises as much as tolerated.  He also reports right shoulder pain and right hand pain which is new since his last visit.  He does report that he had similar left hand pain which resolved after a steroid injection.    INTERVAL HISTORY 9/19/23:  Mr. Smith presents today with history of lower back pain. His current pain level is 0/10.  He feels his pain is better controlled at this point.  He has been playing golf more frequently now. He reports he had an event recently and had a small exacerbation which went away with his tizanidine and ibuprofen.  He feels satisfied with his improvement. He is looking forward to a golf tournament in December.  He still has his first appointment with healthy back program in mid-November.    INTERVAL HISTORY 9/19/23:  He states that he has been trying to get with physical therapy, however he has been having issues with scheduling.  He has started some exercises at home, and he feels his pain has been improving.  He reports that he has been able to take less of the tizanidine and ibuprofen. He is anxious about his hip, as he was told by a surgeon that he may have a hip fracture.  He does admit to some soreness around the left hip.  He also admits that he does  sometimes wake up when he turns to the left or right side.    INITIAL HPI 9/12/23:  Calvin Smith presents to the clinic for the evaluation of lower back pain. He states he has had back pain for 20 years, however he has had exacerbations off/on.  He states that the pain is just in the lower back (no radiation down his legs). He presented to the emergency room on 9/7/23 due to the most severe exacerbation while toweling himself off after a bath.The pain is located in the lower back area and radiates to the sides of the back (not down the legs).  The pain is described as aching and is rated as 6/10. The pain is rated with a score of  0/10 on the BEST day and a score of 10/10 on the WORST day.  Symptoms interfere with daily activity. The pain is exacerbated by Extension and Flexing.  The pain is mitigated by medications.  The patient reports 4 hours of uninterrupted sleep per night (not related to pain).     Patient denies urinary incontinence, bowel incontinence, and significant motor weakness.      Physical Therapy/Home Exercise: yes, currently consistent with home exercise program     Pain Medications:   - ibuprofen 600mg   - robaxin 500mg      report:  Reviewed and consistent with medication use as prescribed.     Pain Procedures:   Had Prior Epidural with no relief.     Imaging:   XR HIPS BILATERAL 2 VIEW INCL AP PELVIS     CLINICAL HISTORY:  Trochanteric bursitis, unspecified hip     TECHNIQUE:  AP view of the pelvis and frogleg lateral views of both hips were performed.     COMPARISON:  None     FINDINGS:  Femoral heads are well located with respect to the acetabula.  Femoral heads maintain a normal round contour.  No acute fracture seen.  Mild osteophyte formation on the left without significant joint space narrowing.     Impression:     No acute osseous abnormality seen.        Electronically signed by: Jacque Oropeza  Date:                                            09/19/2023  Time:                                            10:21    MRI LUMBAR SPINE WITHOUT CONTRAST     CLINICAL HISTORY:  Spinal stenosis, lumbar;     TECHNIQUE:  Multiplanar, multisequence MR images were acquired from the thoracolumbar junction to the sacrum without contrast.     COMPARISON:  CT 09/06/2023, x-ray 09/06/2023     FINDINGS:  Alignment: Mild levo scoliosis.  Minimal grade 1 retrolisthesis L4-5.  Bilateral spondylolysis L5 with grade 1 anterolisthesis L5-S1.     Vertebrae: Benign osseous hemangioma at L4.  Chronic degenerative endplate change at L4-L5 and L5-S1.     Discs: Degenerative disc desiccation from L3-L4 through L5-S1 with mild L4-L5 and severe L5-S1 height loss.     Cord: Conus terminates at L1 and appears unremarkable.  Cauda equina appears unremarkable.     Degenerative findings:     *T12-L1: No spinal canal stenosis or neural foraminal narrowing.  *L1-L2: No spinal canal stenosis or neural foraminal narrowing.  *L2-L3: No spinal canal stenosis or neural foraminal narrowing.  *L3-L4: Mild broad-based posterior disc bulge with right subarticular protrusion that causes mass effect on the right lateral recess and may impinge upon the right descending L4 nerve root.  Bilateral facet arthropathy and bilateral ligamentum flavum hypertrophy.  Mild spinal canal and moderate right lateral recess stenosis.  *L4-L5: Circumferential disc bulge.  Bilateral facet arthropathy and bilateral ligamentum flavum hypertrophy.  Mild-to-moderate bilateral lateral recess stenosis with possible impingement of the right descending L5 nerve root.  Moderate right and mild left neural foraminal narrowing.  *L5-S1: Grade 1 anterolisthesis with uncovering of the intervertebral disc.  Bilateral facet arthropathy.  No spinal canal stenosis.  Moderate to severe bilateral neural foraminal narrowing with suspected impingement of the exiting L5 nerve roots.  Paraspinal muscles & soft tissues: Right simple renal cyst.     Impression:     1. Bilateral L5  spondylolysis with grade 1 anterolisthesis of L5 on S1 contributing to moderate to severe bilateral neural foraminal narrowing with suspected impingement of the exiting L5 nerve roots.  2. Additional lumbar spondylosis at L3-L4 and L4-L5 as detailed above.     Electronically signed by resident: Phil Mccray  Date:                                            09/07/2023  Time:                                           14:11     Electronically signed by: Myles Orozco MD  Date:                                            09/07/2023  Time:                                           15:43     CT LUMBAR SPINE WITHOUT CONTRAST     CLINICAL HISTORY:  Low back pain, no red flags, no prior management;     TECHNIQUE:  Low-dose axial, sagittal and coronal reformations are obtained through the lumbar spine.  Contrast was not administered.     COMPARISON:  X-ray 09/06/2023     FINDINGS:  No acute fractures of the lumbar spine.     Moderate disc space narrowing at L5-S1.  Grade 1 spondylolisthesis of L5 on S1.  Minimal grade 1 retrolisthesis of L4 on L5.     Bilateral spondylolysis of L5 with associated facet arthropathy at L5-S1 and L4-5.     L1-2: No significant abnormality.     L2-3: No significant abnormality.     L3-4: Mild diffuse disc protrusion.  Mild ligamentum flavum hypertrophy.  Severe central canal narrowing.  Neural foramen are adequately maintained.     L4-5: Mild diffuse posterior disc osteophyte complex with mild disc protrusion.  Bilateral facet arthropathy.  Mild-moderate central canal narrowing.  Severe right foraminal narrowing.     L5-S1: Bilateral spondylolysis with grade 1 spondylolisthesis and slight uncovering of the disc posteriorly with mild protrusion.  Severe bilateral foraminal narrowing.  Central canal is adequately maintained.     The remaining visualized paravertebral structures demonstrate no pathology.     Impression:     No acute abnormality.     Multilevel chronic and degenerative changes.   See above comments.        Electronically signed by: Owen Sinclair  Date:                                            09/06/2023  Time:                                           20:05     XR LUMBAR SPINE AP AND LATERAL     CLINICAL HISTORY:  low back pain;     TECHNIQUE:  AP, lateral and spot images were performed of the lumbar spine.     COMPARISON:  None     FINDINGS:  Five lumbar vertebral bodies.  Vertebral body heights are maintained.     Disc space narrowing and endplate changes L5-S1.     Grade 1 anterolisthesis L5-S1 suspected to be on account of pars defects.     Impression:     Please see above.        Electronically signed by: Jacque Oropeza  Date:                                            09/06/2023  Time:                                           16:35    Allergies: Review of patient's allergies indicates:  No Known Allergies    Current Medications:   Current Outpatient Medications   Medication Sig Dispense Refill    aspirin (ECOTRIN) 81 MG EC tablet Take 1 tablet (81 mg total) by mouth once daily. 90 tablet 3    atorvastatin (LIPITOR) 10 MG tablet TAKE 1 TABLET ONCE DAILY 90 tablet 3    fluticasone propionate (FLONASE) 50 mcg/actuation nasal spray SHAKE LIQUID AND USE 1 SPRAY IN EACH NOSTRIL TWICE DAILY 48 g 3    ibuprofen (ADVIL,MOTRIN) 600 MG tablet Take 1 tablet (600 mg total) by mouth every 8 (eight) hours as needed for Pain. 90 tablet 2    LIDOcaine (LIDODERM) 5 % Place 2 patches onto the skin once daily. Remove & Discard patch within 12 hours or as directed by MD 15 patch 0    metFORMIN (GLUCOPHAGE-XR) 500 MG ER 24hr tablet TAKE 1 TABLET DAILY WITH   BREAKFAST 90 tablet 3    scopolamine (TRANSDERM-SCOP) 1.3-1.5 mg (1 mg over 3 days) Place 1 patch onto the skin every 72 hours. 10 patch 0    sertraline (ZOLOFT) 25 MG tablet Take 1 tablet (25 mg total) by mouth once daily. 90 tablet 3    SOOLANTRA 1 % Crea Apply topically.      walker Misc 1 each by Misc.(Non-Drug; Combo Route) route once. Rolling  walker for 1 dose 1 each 0     No current facility-administered medications for this visit.       REVIEW OF SYSTEMS:    GENERAL:  No weight loss, malaise or fevers.  HEENT:  Negative for frequent or significant headaches.  NECK:  Negative for lumps, goiter, pain and significant neck swelling.  RESPIRATORY:  Negative for cough, wheezing or shortness of breath.  CARDIOVASCULAR:  Negative for chest pain, leg swelling or palpitations.  GI:  Negative for abdominal discomfort, blood in stools or black stools or change in bowel habits.  MUSCULOSKELETAL:  See HPI.  SKIN:  Negative for lesions, rash, and itching.  PSYCH:  Negative for sleep disturbance, mood disorder and recent psychosocial stressors.  HEMATOLOGY/LYMPHOLOGY:  Negative for prolonged bleeding, bruising easily or swollen nodes.  NEURO:   No history of headaches, syncope, paralysis, seizures or tremors.  All other reviewed and negative other than HPI.    Past Medical History:  Past Medical History:   Diagnosis Date    Anxiety     Colon polyp     Fatty liver     TIA (transient ischemic attack)        Past Surgical History:  Past Surgical History:   Procedure Laterality Date    APPENDECTOMY  2005    COLONOSCOPY  11/2019    EPIDURAL STEROID INJECTION INTO LUMBAR SPINE  2018    INJECTION OF ANESTHETIC AGENT AROUND MEDIAL BRANCH NERVES INNERVATING LUMBAR FACET JOINT  2019       Family History:  Family History   Problem Relation Name Age of Onset    No Known Problems Mother      Leukemia Father      No Known Problems Sister      No Known Problems Brother      No Known Problems Sister      No Known Problems Brother         Social History:  Social History     Socioeconomic History    Marital status:      Spouse name: Leigh Reese    Number of children: 2   Tobacco Use    Smoking status: Never    Smokeless tobacco: Never   Substance and Sexual Activity    Drug use: Never    Sexual activity: Yes   Social History Narrative    He is satisfied with weight.    Rates diet  as fair to good.    He does not drink at least 1/2 gallon water daily.    He drinks 1-2 coffee/tea/caffeine-containing soft drinks daily.    Total sleep time at night is 6-7 hours (poor quality).    He works 40-50 hours per week.    He does wear seat belts.    Hobbies include tennis.     Social Determinants of Health     Financial Resource Strain: Low Risk  (9/7/2023)    Overall Financial Resource Strain (CARDIA)     Difficulty of Paying Living Expenses: Not hard at all   Food Insecurity: No Food Insecurity (9/7/2023)    Hunger Vital Sign     Worried About Running Out of Food in the Last Year: Never true     Ran Out of Food in the Last Year: Never true   Transportation Needs: No Transportation Needs (9/7/2023)    PRAPARE - Transportation     Lack of Transportation (Medical): No     Lack of Transportation (Non-Medical): No   Physical Activity: Sufficiently Active (9/7/2023)    Exercise Vital Sign     Days of Exercise per Week: 3 days     Minutes of Exercise per Session: 60 min   Stress: No Stress Concern Present (9/7/2023)    [employer] - Occupational Stress Questionnaire     Feeling of Stress : Only a little   Housing Stability: Low Risk  (9/7/2023)    Housing Stability Vital Sign     Unable to Pay for Housing in the Last Year: No     Number of Places Lived in the Last Year: 1     Unstable Housing in the Last Year: No       OBJECTIVE:    BP (!) 105/55   Pulse 81   Temp 98.5 °F (36.9 °C)   Resp 18   Wt 79.4 kg (175 lb)   SpO2 98%   BMI 25.11 kg/m²     PHYSICAL EXAMINATION:  General appearance: Well appearing, in no acute distress, alert and appropriately communicative.  Psych:  Mood and affect appropriate.  Skin: Skin color, texture, turgor normal, no rashes or lesions, in both upper and lower body.  Head/face:  Atraumatic, normocephalic.  Cor: regular rate  Pulm: non-labored breathing  GI: Abdomen non-distended and non-tender.  Back: Straight leg raising in the sitting and supine  positions is negative to radicular pain. No pain to palpation over the spine or paraspinal muscles. Pain with flexion  Extremities: Peripheral joint ROM is full and pain free without obvious instability or laxity in all four extremities.  No deformities, edema, or skin discoloration. Good capillary refill.  Musculoskeletal: shoulder, hip, sacroiliac and knee provocative maneuvers are negative with the exception of right shoulder pain. Bilateral upper and lower extremity strength is normal and symmetric.  No atrophy or tone abnormalities are noted.  Neuro: Bilateral upper and lower extremity coordination and muscle stretch reflexes are physiologic and symmetric.  Negative Clonus. No loss of sensation is noted.  Gait: Normal.    ASSESSMENT: 59 y.o. year old male with lower back/hip pain, consistent with:     1. DDD (degenerative disc disease), lumbar  Procedure Order to Pain Management      2. Right hand pain  X-Ray Hand Complete Right      3. Injury of right shoulder, subsequent encounter  X-Ray Shoulder Trauma 3 view Right        IMPRESSION: Mr. Smith presents for history of lower back pain.  At the time of his 1st encounter with me, he was having severe lower back pain and hip pain.  Since the time of his initial visit he has started physical therapy and he has developed a robust home exercise program.  He feels his pain has significantly improved, and his function has also improved.  He reports that he is playing golf again and running daily.  History and physical exam are consistent with vertebrogenic low back pain myofascial pain syndrome.  Imaging is consistent with lumbar spondylolisthesis with L5 and S1 Modic changes.  We will confirm this with the radiologist. At this point, he may benefit from interventions to give him some additional relief for his pain.    PLAN:  - I have stressed the importance of physical activity and a home exercise plan to help with pain and improve health.  - Patient can continue  with medications for now since they are providing benefits, using them appropriately, and without side effects.  - xray right hand and right shoulder  - schedule for right shoulder and right finger injection  - schedule for bilateral L5/S1 transforaminal injection; he may be a candidate for L5 and S1 intracept procedure  - I counseled on maintaining his home exercise program  - Counseled patient regarding the importance of activity modification and physical therapy.  - RTC after epidural injection    The above plan and management options were discussed at length with patient. Patient is in agreement with the above and verbalized understanding.    nAy Thomas  06/20/2024

## 2024-06-20 NOTE — TELEPHONE ENCOUNTER
----- Message from Aguilar Paz MD sent at 2024 10:55 AM CDT -----  Regarding: Order for MAC MCGARRY    Patient Name: MAC MCGARRY([id number])  Sex: Male  : 1965      PCP: DAVID GARCIA    Center: None     Types of orders made on 2024: Imaging, Procedure Request    Order Date:2024  Ordering User:AGUILAR PAZ [[id number]]  Encounter Provider:Aguilar Paz MD [id number]]  Authorizing Provider: Aguilar Paz MD [[id number]]  Department:[facility] SP[facility][[id number]]    Common Order Information  Procedure -> Transforaminal Injection (Specify level and laterality) Cmt:             Bilateral L5/S1 TFESI    Order Specific Information  Order: Procedure Order to Pain Management [Custom: [id number]]  Order #:          [id number]: 1 FUTURE    Priority: Routine  Class: Clinic Performed    Future Order Information        Expires on:2025            Expected by:2024                   Associated Diagnoses      M51.36 DDD (degenerative disc disease), lumbar      Physician -> BRANDI         Is patient on anti-coagulants? -> No         Facility Name: -> [facility]           Priority: Routine  Class: Clinic Performed    Future Order Information      Expires on:2025            Expected by:2024                     Associated Diagnoses      M51.36 DDD (degenerative disc disease), lumbar      Procedure -> Transforaminal Injection (Specify level and laterality) Cmt:                 Bilateral L5/S1 TFESI        Physician -> BRANDI         Is patient on anti-coagulants? -> No         Facility Name: -> [facility]

## 2024-06-21 ENCOUNTER — TELEPHONE (OUTPATIENT)
Dept: PAIN MEDICINE | Facility: CLINIC | Age: 59
End: 2024-06-21
Payer: COMMERCIAL

## 2024-06-21 ENCOUNTER — TELEPHONE (OUTPATIENT)
Dept: SPINE | Facility: CLINIC | Age: 59
End: 2024-06-21
Payer: COMMERCIAL

## 2024-06-21 ENCOUNTER — OFFICE VISIT (OUTPATIENT)
Dept: PAIN MEDICINE | Facility: CLINIC | Age: 59
End: 2024-06-21
Payer: COMMERCIAL

## 2024-06-21 VITALS
HEART RATE: 88 BPM | SYSTOLIC BLOOD PRESSURE: 132 MMHG | BODY MASS INDEX: 26.04 KG/M2 | DIASTOLIC BLOOD PRESSURE: 80 MMHG | WEIGHT: 181.88 LBS | HEIGHT: 70 IN

## 2024-06-21 DIAGNOSIS — M54.51 VERTEBROGENIC LOW BACK PAIN: Primary | ICD-10-CM

## 2024-06-21 DIAGNOSIS — M25.511 CHRONIC RIGHT SHOULDER PAIN: ICD-10-CM

## 2024-06-21 DIAGNOSIS — G89.29 CHRONIC RIGHT SHOULDER PAIN: ICD-10-CM

## 2024-06-21 DIAGNOSIS — M79.641 RIGHT HAND PAIN: ICD-10-CM

## 2024-06-21 PROCEDURE — 99999 PR PBB SHADOW E&M-EST. PATIENT-LVL III: CPT | Mod: PBBFAC,,, | Performed by: STUDENT IN AN ORGANIZED HEALTH CARE EDUCATION/TRAINING PROGRAM

## 2024-06-21 RX ORDER — TRIAMCINOLONE ACETONIDE 40 MG/ML
40 INJECTION, SUSPENSION INTRA-ARTICULAR; INTRAMUSCULAR
Status: DISCONTINUED | OUTPATIENT
Start: 2024-06-21 | End: 2024-06-21 | Stop reason: HOSPADM

## 2024-06-21 RX ORDER — TRIAMCINOLONE ACETONIDE 40 MG/ML
40 INJECTION, SUSPENSION INTRA-ARTICULAR; INTRAMUSCULAR
Status: COMPLETED | OUTPATIENT
Start: 2024-06-21 | End: 2024-06-21

## 2024-06-21 RX ADMIN — TRIAMCINOLONE ACETONIDE 40 MG: 40 INJECTION, SUSPENSION INTRA-ARTICULAR; INTRAMUSCULAR at 02:06

## 2024-06-21 RX ADMIN — TRIAMCINOLONE ACETONIDE 40 MG: 40 INJECTION, SUSPENSION INTRA-ARTICULAR; INTRAMUSCULAR at 01:06

## 2024-06-21 NOTE — TELEPHONE ENCOUNTER
Staff tried calling patient to get him scheduled for his steroid injection shoulder, but there was no answer, shellf left voicemail in detail.

## 2024-06-21 NOTE — PROGRESS NOTES
Chronic patient Established Note (Follow up visit)      SUBJECTIVE:    Calvin Smith presents to the clinic for a follow-up appointment for chronic pain    Pain Disability Index Review:      6/21/2024     1:07 PM 10/31/2023     9:23 AM   Last 3 PDI Scores   Pain Disability Index (PDI) 56 0     INTERVAL HISTORY 6/21/2024:  Mr. Smith returns for right shoulder, right thumb, and lower back pain. Current Pain level is 9/10.  He reports that last night the shoulder was unbearable and wanted to return to clinic for a steroid injection as soon as possible.  He continues to have lower back pain, and is tentatively scheduled for a bilateral L5/S1 transforaminal epidural steroid injection for vertebrogenic pain.    INTERVAL HISTORY 6/20/2024:  Ms. Lau presents for chronic back pain. Current Pain level is 9/10.  He reports that he continues to do his home exercises and feels that it does help a little. However, he is noticing diminishing returns. He continues to feel the pain is across his lower back like a band, and is worse with lifting/flexing forward, and at night while lying in bed.  He completed the healthy back program and he has continued maintaining the exercises as much as tolerated.  He also reports right shoulder pain and right hand pain which is new since his last visit.  He does report that he had similar left hand pain which resolved after a steroid injection.    INTERVAL HISTORY 9/19/23:  Mr. Smith presents today with history of lower back pain. His current pain level is 0/10.  He feels his pain is better controlled at this point.  He has been playing golf more frequently now. He reports he had an event recently and had a small exacerbation which went away with his tizanidine and ibuprofen.  He feels satisfied with his improvement. He is looking forward to a golf tournament in December.  He still has his first appointment with healthy back program in mid-November.    INTERVAL HISTORY 9/19/23:  He  states that he has been trying to get with physical therapy, however he has been having issues with scheduling.  He has started some exercises at home, and he feels his pain has been improving.  He reports that he has been able to take less of the tizanidine and ibuprofen. He is anxious about his hip, as he was told by a surgeon that he may have a hip fracture.  He does admit to some soreness around the left hip.  He also admits that he does sometimes wake up when he turns to the left or right side.    INITIAL HPI 9/12/23:  Calvin Smith presents to the clinic for the evaluation of lower back pain. He states he has had back pain for 20 years, however he has had exacerbations off/on.  He states that the pain is just in the lower back (no radiation down his legs). He presented to the emergency room on 9/7/23 due to the most severe exacerbation while toweling himself off after a bath.The pain is located in the lower back area and radiates to the sides of the back (not down the legs).  The pain is described as aching and is rated as 6/10. The pain is rated with a score of  0/10 on the BEST day and a score of 10/10 on the WORST day.  Symptoms interfere with daily activity. The pain is exacerbated by Extension and Flexing.  The pain is mitigated by medications.  The patient reports 4 hours of uninterrupted sleep per night (not related to pain).     Patient denies urinary incontinence, bowel incontinence, and significant motor weakness.      Physical Therapy/Home Exercise: yes, currently consistent with home exercise program     Pain Medications:   - ibuprofen 600mg   - robaxin 500mg      report:  Reviewed and consistent with medication use as prescribed.     Pain Procedures:   Had Prior Epidural with no relief.     Imaging:   XR SHOULDER TRAUMA 3 VIEW RIGHT     CLINICAL HISTORY:  Unspecified injury of right shoulder and upper arm, subsequent encounter     TECHNIQUE:  Three or four views of the right shoulder were  performed.     COMPARISON:  None     FINDINGS:  Bones are well mineralized.  The glenohumeral joint and AC joint appear intact.  No fracture or dislocation is seen.  No significant degenerative changes.  No soft tissue abnormality.     Impression:     No acute abnormality        Electronically signed by:Eduardo Armstrong MD  Date:                                            06/20/2024  Time:                                           12:02    XR HAND COMPLETE 3 VIEW RIGHT     CLINICAL HISTORY:  Pain in right hand     TECHNIQUE:  PA, lateral, and oblique views of the right hand were performed.     COMPARISON:  None     FINDINGS:  Bones are well mineralized.  Alignment is satisfactory and joint spaces appear adequately maintained.  No fracture, dislocation, or erosive change.  No significant degenerative changes.  No soft tissue abnormality.     Impression:     No acute abnormality        Electronically signed by:Eduardo Armstrong MD  Date:                                            06/20/2024  Time:                                           12:04    XR HIPS BILATERAL 2 VIEW INCL AP PELVIS     CLINICAL HISTORY:  Trochanteric bursitis, unspecified hip     TECHNIQUE:  AP view of the pelvis and frogleg lateral views of both hips were performed.     COMPARISON:  None     FINDINGS:  Femoral heads are well located with respect to the acetabula.  Femoral heads maintain a normal round contour.  No acute fracture seen.  Mild osteophyte formation on the left without significant joint space narrowing.     Impression:     No acute osseous abnormality seen.        Electronically signed by: Jacque Oropeza  Date:                                            09/19/2023  Time:                                           10:21    MRI LUMBAR SPINE WITHOUT CONTRAST     CLINICAL HISTORY:  Spinal stenosis, lumbar;     TECHNIQUE:  Multiplanar, multisequence MR images were acquired from the thoracolumbar junction to the sacrum without contrast.      COMPARISON:  CT 09/06/2023, x-ray 09/06/2023     FINDINGS:  Alignment: Mild levo scoliosis.  Minimal grade 1 retrolisthesis L4-5.  Bilateral spondylolysis L5 with grade 1 anterolisthesis L5-S1.     Vertebrae: Benign osseous hemangioma at L4.  Chronic degenerative endplate change at L4-L5 and L5-S1.     Discs: Degenerative disc desiccation from L3-L4 through L5-S1 with mild L4-L5 and severe L5-S1 height loss.     Cord: Conus terminates at L1 and appears unremarkable.  Cauda equina appears unremarkable.     Degenerative findings:     *T12-L1: No spinal canal stenosis or neural foraminal narrowing.  *L1-L2: No spinal canal stenosis or neural foraminal narrowing.  *L2-L3: No spinal canal stenosis or neural foraminal narrowing.  *L3-L4: Mild broad-based posterior disc bulge with right subarticular protrusion that causes mass effect on the right lateral recess and may impinge upon the right descending L4 nerve root.  Bilateral facet arthropathy and bilateral ligamentum flavum hypertrophy.  Mild spinal canal and moderate right lateral recess stenosis.  *L4-L5: Circumferential disc bulge.  Bilateral facet arthropathy and bilateral ligamentum flavum hypertrophy.  Mild-to-moderate bilateral lateral recess stenosis with possible impingement of the right descending L5 nerve root.  Moderate right and mild left neural foraminal narrowing.  *L5-S1: Grade 1 anterolisthesis with uncovering of the intervertebral disc.  Bilateral facet arthropathy.  No spinal canal stenosis.  Moderate to severe bilateral neural foraminal narrowing with suspected impingement of the exiting L5 nerve roots.  Paraspinal muscles & soft tissues: Right simple renal cyst.     Impression:     1. Bilateral L5 spondylolysis with grade 1 anterolisthesis of L5 on S1 contributing to moderate to severe bilateral neural foraminal narrowing with suspected impingement of the exiting L5 nerve roots.  2. Additional lumbar spondylosis at L3-L4 and L4-L5 as detailed  above.     Electronically signed by resident: Phil Mccray  Date:                                            09/07/2023  Time:                                           14:11     Electronically signed by: Myles Orozco MD  Date:                                            09/07/2023  Time:                                           15:43     CT LUMBAR SPINE WITHOUT CONTRAST     CLINICAL HISTORY:  Low back pain, no red flags, no prior management;     TECHNIQUE:  Low-dose axial, sagittal and coronal reformations are obtained through the lumbar spine.  Contrast was not administered.     COMPARISON:  X-ray 09/06/2023     FINDINGS:  No acute fractures of the lumbar spine.     Moderate disc space narrowing at L5-S1.  Grade 1 spondylolisthesis of L5 on S1.  Minimal grade 1 retrolisthesis of L4 on L5.     Bilateral spondylolysis of L5 with associated facet arthropathy at L5-S1 and L4-5.     L1-2: No significant abnormality.     L2-3: No significant abnormality.     L3-4: Mild diffuse disc protrusion.  Mild ligamentum flavum hypertrophy.  Severe central canal narrowing.  Neural foramen are adequately maintained.     L4-5: Mild diffuse posterior disc osteophyte complex with mild disc protrusion.  Bilateral facet arthropathy.  Mild-moderate central canal narrowing.  Severe right foraminal narrowing.     L5-S1: Bilateral spondylolysis with grade 1 spondylolisthesis and slight uncovering of the disc posteriorly with mild protrusion.  Severe bilateral foraminal narrowing.  Central canal is adequately maintained.     The remaining visualized paravertebral structures demonstrate no pathology.     Impression:     No acute abnormality.     Multilevel chronic and degenerative changes.  See above comments.        Electronically signed by: Owen Sinclair  Date:                                            09/06/2023  Time:                                           20:05     XR LUMBAR SPINE AP AND LATERAL     CLINICAL HISTORY:  low  back pain;     TECHNIQUE:  AP, lateral and spot images were performed of the lumbar spine.     COMPARISON:  None     FINDINGS:  Five lumbar vertebral bodies.  Vertebral body heights are maintained.     Disc space narrowing and endplate changes L5-S1.     Grade 1 anterolisthesis L5-S1 suspected to be on account of pars defects.     Impression:     Please see above.        Electronically signed by: Jacque Oropeza  Date:                                            09/06/2023  Time:                                           16:35    Allergies: Review of patient's allergies indicates:  No Known Allergies    Current Medications:   Current Outpatient Medications   Medication Sig Dispense Refill    aspirin (ECOTRIN) 81 MG EC tablet Take 1 tablet (81 mg total) by mouth once daily. 90 tablet 3    atorvastatin (LIPITOR) 10 MG tablet TAKE 1 TABLET ONCE DAILY 90 tablet 3    fluticasone propionate (FLONASE) 50 mcg/actuation nasal spray SHAKE LIQUID AND USE 1 SPRAY IN EACH NOSTRIL TWICE DAILY 48 g 3    ibuprofen (ADVIL,MOTRIN) 600 MG tablet Take 1 tablet (600 mg total) by mouth every 8 (eight) hours as needed for Pain. 90 tablet 2    LIDOcaine (LIDODERM) 5 % Place 2 patches onto the skin once daily. Remove & Discard patch within 12 hours or as directed by MD 15 patch 0    metFORMIN (GLUCOPHAGE-XR) 500 MG ER 24hr tablet TAKE 1 TABLET DAILY WITH   BREAKFAST 90 tablet 3    scopolamine (TRANSDERM-SCOP) 1.3-1.5 mg (1 mg over 3 days) Place 1 patch onto the skin every 72 hours. 10 patch 0    sertraline (ZOLOFT) 25 MG tablet Take 1 tablet (25 mg total) by mouth once daily. 90 tablet 3    SOOLANTRA 1 % Crea Apply topically.      walker Misc 1 each by Misc.(Non-Drug; Combo Route) route once. Rolling walker for 1 dose 1 each 0     No current facility-administered medications for this visit.       REVIEW OF SYSTEMS:    GENERAL:  No weight loss, malaise or fevers.  HEENT:  Negative for frequent or significant headaches.  NECK:  Negative for  lumps, goiter, pain and significant neck swelling.  RESPIRATORY:  Negative for cough, wheezing or shortness of breath.  CARDIOVASCULAR:  Negative for chest pain, leg swelling or palpitations.  GI:  Negative for abdominal discomfort, blood in stools or black stools or change in bowel habits.  MUSCULOSKELETAL:  See HPI.  SKIN:  Negative for lesions, rash, and itching.  PSYCH:  Negative for sleep disturbance, mood disorder and recent psychosocial stressors.  HEMATOLOGY/LYMPHOLOGY:  Negative for prolonged bleeding, bruising easily or swollen nodes.  NEURO:   No history of headaches, syncope, paralysis, seizures or tremors.  All other reviewed and negative other than HPI.    Past Medical History:  Past Medical History:   Diagnosis Date    Anxiety     Colon polyp     Fatty liver     TIA (transient ischemic attack)        Past Surgical History:  Past Surgical History:   Procedure Laterality Date    APPENDECTOMY  2005    COLONOSCOPY  11/2019    EPIDURAL STEROID INJECTION INTO LUMBAR SPINE  2018    INJECTION OF ANESTHETIC AGENT AROUND MEDIAL BRANCH NERVES INNERVATING LUMBAR FACET JOINT  2019       Family History:  Family History   Problem Relation Name Age of Onset    No Known Problems Mother      Leukemia Father      No Known Problems Sister      No Known Problems Brother      No Known Problems Sister      No Known Problems Brother         Social History:  Social History     Socioeconomic History    Marital status:      Spouse name: Leigh Reese    Number of children: 2   Tobacco Use    Smoking status: Never    Smokeless tobacco: Never   Substance and Sexual Activity    Drug use: Never    Sexual activity: Yes   Social History Narrative    He is satisfied with weight.    Rates diet as fair to good.    He does not drink at least 1/2 gallon water daily.    He drinks 1-2 coffee/tea/caffeine-containing soft drinks daily.    Total sleep time at night is 6-7 hours (poor quality).    He works 40-50 hours per week.    He does  "wear seat belts.    Hobbies include tennis.     Social Determinants of Health     Financial Resource Strain: Low Risk  (9/7/2023)    Overall Financial Resource Strain (CARDIA)     Difficulty of Paying Living Expenses: Not hard at all   Food Insecurity: No Food Insecurity (9/7/2023)    Hunger Vital Sign     Worried About Running Out of Food in the Last Year: Never true     Ran Out of Food in the Last Year: Never true   Transportation Needs: No Transportation Needs (9/7/2023)    PRAPARE - Transportation     Lack of Transportation (Medical): No     Lack of Transportation (Non-Medical): No   Physical Activity: Sufficiently Active (9/7/2023)    Exercise Vital Sign     Days of Exercise per Week: 3 days     Minutes of Exercise per Session: 60 min   Stress: No Stress Concern Present (9/7/2023)    [employer] - Occupational Stress Questionnaire     Feeling of Stress : Only a little   Housing Stability: Low Risk  (9/7/2023)    Housing Stability Vital Sign     Unable to Pay for Housing in the Last Year: No     Number of Places Lived in the Last Year: 1     Unstable Housing in the Last Year: No       OBJECTIVE:    /80 (BP Location: Right arm, Patient Position: Sitting)   Pulse 88   Ht 5' 10" (1.778 m)   Wt 82.5 kg (181 lb 14.1 oz)   BMI 26.10 kg/m²     PHYSICAL EXAMINATION:  General appearance: Well appearing, in no acute distress, alert and appropriately communicative.  Psych:  Mood and affect appropriate.  Skin: Skin color, texture, turgor normal, no rashes or lesions, in both upper and lower body.  Head/face:  Atraumatic, normocephalic.  Cor: regular rate  Pulm: non-labored breathing  GI: Abdomen non-distended and non-tender.  Back: Straight leg raising in the sitting and supine positions is negative to radicular pain. No pain to palpation over the spine or paraspinal muscles. Pain with flexion  Extremities: Peripheral joint ROM is full and pain free without obvious instability or " laxity in all four extremities.  No deformities, edema, or skin discoloration. Good capillary refill.  Musculoskeletal: shoulder, hip, sacroiliac and knee provocative maneuvers are negative with the exception of right shoulder pain and right distal interphalangeal 1st digit pain. Bilateral upper and lower extremity strength is normal and symmetric.  No atrophy or tone abnormalities are noted.  Neuro: Bilateral upper and lower extremity coordination and muscle stretch reflexes are physiologic and symmetric.  Negative Clonus. No loss of sensation is noted.  Gait: Normal.    ASSESSMENT: 59 y.o. year old male with lower back/hip pain, consistent with:     1. Vertebrogenic low back pain        2. Chronic right shoulder pain        3. Right hand pain            IMPRESSION: Mr. Smith presents for history of lower back pain, right shoulder, and right finger pain.  He was seen in clinic for these same issues yesterday and is requesting a right shoulder block/right finger block today  History and physical exam are consistent with vertebrogenic low back pain myofascial pain syndrome.  Imaging is consistent with lumbar spondylolisthesis with L4, L5, and S1 Modic changes, and unremarkable right shoulder and right hand.   At this point, he may benefit from interventions to give him some additional relief for his pain.    PLAN:  - I have stressed the importance of physical activity and a home exercise plan to help with pain and improve health.  - Patient can continue with medications for now since they are providing benefits, using them appropriately, and without side effects.  - xray right hand and right shoulder with patient  - right shoulder and right finger injection done today in clinic  - if he fails to get relief from these injections, he may warrant a right shoulder MRI and/or referral to hand surgery for his right hand pain  - MRI lumbar spine reviewed at length with reading radiologist. Per discussion, he does indeed  have T1 and T2 hyperintensity along the endplates of L4, L5, and S1, consistent with chronic degenerative endplate changes.  - schedule for bilateral L5/S1 transforaminal injection; he may be a candidate for L4, L5, and S1 intracept procedure  - I counseled on maintaining his home exercise program  - Counseled patient regarding the importance of activity modification and physical therapy.  - RTC after epidural injection    The above plan and management options were discussed at length with patient. Patient is in agreement with the above and verbalized understanding.    Any Thomas  06/21/2024

## 2024-06-21 NOTE — TELEPHONE ENCOUNTER
----- Message from Roe Lao sent at 6/21/2024  4:45 PM CDT -----  Type:  Patient Returning Call    Who Called:     Who Left Message for Patient:     Does the patient know what this is regarding?: missed call     Best Call Back Number:   [phone]    Additional Information:

## 2024-06-21 NOTE — TELEPHONE ENCOUNTER
Scheduled patient as ordered by Dr. William meier at 1pm. Patient notified and verbalized an understanding.

## 2024-06-21 NOTE — H&P (VIEW-ONLY)
Chronic patient Established Note (Follow up visit)      SUBJECTIVE:    Calvin Smith presents to the clinic for a follow-up appointment for chronic pain    Pain Disability Index Review:      6/21/2024     1:07 PM 10/31/2023     9:23 AM   Last 3 PDI Scores   Pain Disability Index (PDI) 56 0     INTERVAL HISTORY 6/21/2024:  Mr. Smith returns for right shoulder, right thumb, and lower back pain. Current Pain level is 9/10.  He reports that last night the shoulder was unbearable and wanted to return to clinic for a steroid injection as soon as possible.  He continues to have lower back pain, and is tentatively scheduled for a bilateral L5/S1 transforaminal epidural steroid injection for vertebrogenic pain.    INTERVAL HISTORY 6/20/2024:  Ms. Lau presents for chronic back pain. Current Pain level is 9/10.  He reports that he continues to do his home exercises and feels that it does help a little. However, he is noticing diminishing returns. He continues to feel the pain is across his lower back like a band, and is worse with lifting/flexing forward, and at night while lying in bed.  He completed the healthy back program and he has continued maintaining the exercises as much as tolerated.  He also reports right shoulder pain and right hand pain which is new since his last visit.  He does report that he had similar left hand pain which resolved after a steroid injection.    INTERVAL HISTORY 9/19/23:  Mr. Smith presents today with history of lower back pain. His current pain level is 0/10.  He feels his pain is better controlled at this point.  He has been playing golf more frequently now. He reports he had an event recently and had a small exacerbation which went away with his tizanidine and ibuprofen.  He feels satisfied with his improvement. He is looking forward to a golf tournament in December.  He still has his first appointment with healthy back program in mid-November.    INTERVAL HISTORY 9/19/23:  He  states that he has been trying to get with physical therapy, however he has been having issues with scheduling.  He has started some exercises at home, and he feels his pain has been improving.  He reports that he has been able to take less of the tizanidine and ibuprofen. He is anxious about his hip, as he was told by a surgeon that he may have a hip fracture.  He does admit to some soreness around the left hip.  He also admits that he does sometimes wake up when he turns to the left or right side.    INITIAL HPI 9/12/23:  Calvin Smith presents to the clinic for the evaluation of lower back pain. He states he has had back pain for 20 years, however he has had exacerbations off/on.  He states that the pain is just in the lower back (no radiation down his legs). He presented to the emergency room on 9/7/23 due to the most severe exacerbation while toweling himself off after a bath.The pain is located in the lower back area and radiates to the sides of the back (not down the legs).  The pain is described as aching and is rated as 6/10. The pain is rated with a score of  0/10 on the BEST day and a score of 10/10 on the WORST day.  Symptoms interfere with daily activity. The pain is exacerbated by Extension and Flexing.  The pain is mitigated by medications.  The patient reports 4 hours of uninterrupted sleep per night (not related to pain).     Patient denies urinary incontinence, bowel incontinence, and significant motor weakness.      Physical Therapy/Home Exercise: yes, currently consistent with home exercise program     Pain Medications:   - ibuprofen 600mg   - robaxin 500mg      report:  Reviewed and consistent with medication use as prescribed.     Pain Procedures:   Had Prior Epidural with no relief.     Imaging:   XR SHOULDER TRAUMA 3 VIEW RIGHT     CLINICAL HISTORY:  Unspecified injury of right shoulder and upper arm, subsequent encounter     TECHNIQUE:  Three or four views of the right shoulder were  performed.     COMPARISON:  None     FINDINGS:  Bones are well mineralized.  The glenohumeral joint and AC joint appear intact.  No fracture or dislocation is seen.  No significant degenerative changes.  No soft tissue abnormality.     Impression:     No acute abnormality        Electronically signed by:Eduardo Armstrong MD  Date:                                            06/20/2024  Time:                                           12:02    XR HAND COMPLETE 3 VIEW RIGHT     CLINICAL HISTORY:  Pain in right hand     TECHNIQUE:  PA, lateral, and oblique views of the right hand were performed.     COMPARISON:  None     FINDINGS:  Bones are well mineralized.  Alignment is satisfactory and joint spaces appear adequately maintained.  No fracture, dislocation, or erosive change.  No significant degenerative changes.  No soft tissue abnormality.     Impression:     No acute abnormality        Electronically signed by:Eduardo Armstrong MD  Date:                                            06/20/2024  Time:                                           12:04    XR HIPS BILATERAL 2 VIEW INCL AP PELVIS     CLINICAL HISTORY:  Trochanteric bursitis, unspecified hip     TECHNIQUE:  AP view of the pelvis and frogleg lateral views of both hips were performed.     COMPARISON:  None     FINDINGS:  Femoral heads are well located with respect to the acetabula.  Femoral heads maintain a normal round contour.  No acute fracture seen.  Mild osteophyte formation on the left without significant joint space narrowing.     Impression:     No acute osseous abnormality seen.        Electronically signed by: Jacque Oropeza  Date:                                            09/19/2023  Time:                                           10:21    MRI LUMBAR SPINE WITHOUT CONTRAST     CLINICAL HISTORY:  Spinal stenosis, lumbar;     TECHNIQUE:  Multiplanar, multisequence MR images were acquired from the thoracolumbar junction to the sacrum without contrast.      COMPARISON:  CT 09/06/2023, x-ray 09/06/2023     FINDINGS:  Alignment: Mild levo scoliosis.  Minimal grade 1 retrolisthesis L4-5.  Bilateral spondylolysis L5 with grade 1 anterolisthesis L5-S1.     Vertebrae: Benign osseous hemangioma at L4.  Chronic degenerative endplate change at L4-L5 and L5-S1.     Discs: Degenerative disc desiccation from L3-L4 through L5-S1 with mild L4-L5 and severe L5-S1 height loss.     Cord: Conus terminates at L1 and appears unremarkable.  Cauda equina appears unremarkable.     Degenerative findings:     *T12-L1: No spinal canal stenosis or neural foraminal narrowing.  *L1-L2: No spinal canal stenosis or neural foraminal narrowing.  *L2-L3: No spinal canal stenosis or neural foraminal narrowing.  *L3-L4: Mild broad-based posterior disc bulge with right subarticular protrusion that causes mass effect on the right lateral recess and may impinge upon the right descending L4 nerve root.  Bilateral facet arthropathy and bilateral ligamentum flavum hypertrophy.  Mild spinal canal and moderate right lateral recess stenosis.  *L4-L5: Circumferential disc bulge.  Bilateral facet arthropathy and bilateral ligamentum flavum hypertrophy.  Mild-to-moderate bilateral lateral recess stenosis with possible impingement of the right descending L5 nerve root.  Moderate right and mild left neural foraminal narrowing.  *L5-S1: Grade 1 anterolisthesis with uncovering of the intervertebral disc.  Bilateral facet arthropathy.  No spinal canal stenosis.  Moderate to severe bilateral neural foraminal narrowing with suspected impingement of the exiting L5 nerve roots.  Paraspinal muscles & soft tissues: Right simple renal cyst.     Impression:     1. Bilateral L5 spondylolysis with grade 1 anterolisthesis of L5 on S1 contributing to moderate to severe bilateral neural foraminal narrowing with suspected impingement of the exiting L5 nerve roots.  2. Additional lumbar spondylosis at L3-L4 and L4-L5 as detailed  above.     Electronically signed by resident: Phil Mccray  Date:                                            09/07/2023  Time:                                           14:11     Electronically signed by: Myles Orozco MD  Date:                                            09/07/2023  Time:                                           15:43     CT LUMBAR SPINE WITHOUT CONTRAST     CLINICAL HISTORY:  Low back pain, no red flags, no prior management;     TECHNIQUE:  Low-dose axial, sagittal and coronal reformations are obtained through the lumbar spine.  Contrast was not administered.     COMPARISON:  X-ray 09/06/2023     FINDINGS:  No acute fractures of the lumbar spine.     Moderate disc space narrowing at L5-S1.  Grade 1 spondylolisthesis of L5 on S1.  Minimal grade 1 retrolisthesis of L4 on L5.     Bilateral spondylolysis of L5 with associated facet arthropathy at L5-S1 and L4-5.     L1-2: No significant abnormality.     L2-3: No significant abnormality.     L3-4: Mild diffuse disc protrusion.  Mild ligamentum flavum hypertrophy.  Severe central canal narrowing.  Neural foramen are adequately maintained.     L4-5: Mild diffuse posterior disc osteophyte complex with mild disc protrusion.  Bilateral facet arthropathy.  Mild-moderate central canal narrowing.  Severe right foraminal narrowing.     L5-S1: Bilateral spondylolysis with grade 1 spondylolisthesis and slight uncovering of the disc posteriorly with mild protrusion.  Severe bilateral foraminal narrowing.  Central canal is adequately maintained.     The remaining visualized paravertebral structures demonstrate no pathology.     Impression:     No acute abnormality.     Multilevel chronic and degenerative changes.  See above comments.        Electronically signed by: Owen Sinclair  Date:                                            09/06/2023  Time:                                           20:05     XR LUMBAR SPINE AP AND LATERAL     CLINICAL HISTORY:  low  back pain;     TECHNIQUE:  AP, lateral and spot images were performed of the lumbar spine.     COMPARISON:  None     FINDINGS:  Five lumbar vertebral bodies.  Vertebral body heights are maintained.     Disc space narrowing and endplate changes L5-S1.     Grade 1 anterolisthesis L5-S1 suspected to be on account of pars defects.     Impression:     Please see above.        Electronically signed by: Jacque Oropeza  Date:                                            09/06/2023  Time:                                           16:35    Allergies: Review of patient's allergies indicates:  No Known Allergies    Current Medications:   Current Outpatient Medications   Medication Sig Dispense Refill    aspirin (ECOTRIN) 81 MG EC tablet Take 1 tablet (81 mg total) by mouth once daily. 90 tablet 3    atorvastatin (LIPITOR) 10 MG tablet TAKE 1 TABLET ONCE DAILY 90 tablet 3    fluticasone propionate (FLONASE) 50 mcg/actuation nasal spray SHAKE LIQUID AND USE 1 SPRAY IN EACH NOSTRIL TWICE DAILY 48 g 3    ibuprofen (ADVIL,MOTRIN) 600 MG tablet Take 1 tablet (600 mg total) by mouth every 8 (eight) hours as needed for Pain. 90 tablet 2    LIDOcaine (LIDODERM) 5 % Place 2 patches onto the skin once daily. Remove & Discard patch within 12 hours or as directed by MD 15 patch 0    metFORMIN (GLUCOPHAGE-XR) 500 MG ER 24hr tablet TAKE 1 TABLET DAILY WITH   BREAKFAST 90 tablet 3    scopolamine (TRANSDERM-SCOP) 1.3-1.5 mg (1 mg over 3 days) Place 1 patch onto the skin every 72 hours. 10 patch 0    sertraline (ZOLOFT) 25 MG tablet Take 1 tablet (25 mg total) by mouth once daily. 90 tablet 3    SOOLANTRA 1 % Crea Apply topically.      walker Misc 1 each by Misc.(Non-Drug; Combo Route) route once. Rolling walker for 1 dose 1 each 0     No current facility-administered medications for this visit.       REVIEW OF SYSTEMS:    GENERAL:  No weight loss, malaise or fevers.  HEENT:  Negative for frequent or significant headaches.  NECK:  Negative for  lumps, goiter, pain and significant neck swelling.  RESPIRATORY:  Negative for cough, wheezing or shortness of breath.  CARDIOVASCULAR:  Negative for chest pain, leg swelling or palpitations.  GI:  Negative for abdominal discomfort, blood in stools or black stools or change in bowel habits.  MUSCULOSKELETAL:  See HPI.  SKIN:  Negative for lesions, rash, and itching.  PSYCH:  Negative for sleep disturbance, mood disorder and recent psychosocial stressors.  HEMATOLOGY/LYMPHOLOGY:  Negative for prolonged bleeding, bruising easily or swollen nodes.  NEURO:   No history of headaches, syncope, paralysis, seizures or tremors.  All other reviewed and negative other than HPI.    Past Medical History:  Past Medical History:   Diagnosis Date    Anxiety     Colon polyp     Fatty liver     TIA (transient ischemic attack)        Past Surgical History:  Past Surgical History:   Procedure Laterality Date    APPENDECTOMY  2005    COLONOSCOPY  11/2019    EPIDURAL STEROID INJECTION INTO LUMBAR SPINE  2018    INJECTION OF ANESTHETIC AGENT AROUND MEDIAL BRANCH NERVES INNERVATING LUMBAR FACET JOINT  2019       Family History:  Family History   Problem Relation Name Age of Onset    No Known Problems Mother      Leukemia Father      No Known Problems Sister      No Known Problems Brother      No Known Problems Sister      No Known Problems Brother         Social History:  Social History     Socioeconomic History    Marital status:      Spouse name: Leigh Reese    Number of children: 2   Tobacco Use    Smoking status: Never    Smokeless tobacco: Never   Substance and Sexual Activity    Drug use: Never    Sexual activity: Yes   Social History Narrative    He is satisfied with weight.    Rates diet as fair to good.    He does not drink at least 1/2 gallon water daily.    He drinks 1-2 coffee/tea/caffeine-containing soft drinks daily.    Total sleep time at night is 6-7 hours (poor quality).    He works 40-50 hours per week.    He does  "wear seat belts.    Hobbies include tennis.     Social Determinants of Health     Financial Resource Strain: Low Risk  (9/7/2023)    Overall Financial Resource Strain (CARDIA)     Difficulty of Paying Living Expenses: Not hard at all   Food Insecurity: No Food Insecurity (9/7/2023)    Hunger Vital Sign     Worried About Running Out of Food in the Last Year: Never true     Ran Out of Food in the Last Year: Never true   Transportation Needs: No Transportation Needs (9/7/2023)    PRAPARE - Transportation     Lack of Transportation (Medical): No     Lack of Transportation (Non-Medical): No   Physical Activity: Sufficiently Active (9/7/2023)    Exercise Vital Sign     Days of Exercise per Week: 3 days     Minutes of Exercise per Session: 60 min   Stress: No Stress Concern Present (9/7/2023)    [employer] [employer] [place] [employer] - Occupational Stress Questionnaire     Feeling of Stress : Only a little   Housing Stability: Low Risk  (9/7/2023)    Housing Stability Vital Sign     Unable to Pay for Housing in the Last Year: No     Number of Places Lived in the Last Year: 1     Unstable Housing in the Last Year: No       OBJECTIVE:    /80 (BP Location: Right arm, Patient Position: Sitting)   Pulse 88   Ht 5' 10" (1.778 m)   Wt 82.5 kg (181 lb 14.1 oz)   BMI 26.10 kg/m²     PHYSICAL EXAMINATION:  General appearance: Well appearing, in no acute distress, alert and appropriately communicative.  Psych:  Mood and affect appropriate.  Skin: Skin color, texture, turgor normal, no rashes or lesions, in both upper and lower body.  Head/face:  Atraumatic, normocephalic.  Cor: regular rate  Pulm: non-labored breathing  GI: Abdomen non-distended and non-tender.  Back: Straight leg raising in the sitting and supine positions is negative to radicular pain. No pain to palpation over the spine or paraspinal muscles. Pain with flexion  Extremities: Peripheral joint ROM is full and pain free without obvious instability or " laxity in all four extremities.  No deformities, edema, or skin discoloration. Good capillary refill.  Musculoskeletal: shoulder, hip, sacroiliac and knee provocative maneuvers are negative with the exception of right shoulder pain and right distal interphalangeal 1st digit pain. Bilateral upper and lower extremity strength is normal and symmetric.  No atrophy or tone abnormalities are noted.  Neuro: Bilateral upper and lower extremity coordination and muscle stretch reflexes are physiologic and symmetric.  Negative Clonus. No loss of sensation is noted.  Gait: Normal.    ASSESSMENT: 59 y.o. year old male with lower back/hip pain, consistent with:     1. Vertebrogenic low back pain        2. Chronic right shoulder pain        3. Right hand pain            IMPRESSION: Mr. Smith presents for history of lower back pain, right shoulder, and right finger pain.  He was seen in clinic for these same issues yesterday and is requesting a right shoulder block/right finger block today  History and physical exam are consistent with vertebrogenic low back pain myofascial pain syndrome.  Imaging is consistent with lumbar spondylolisthesis with L4, L5, and S1 Modic changes, and unremarkable right shoulder and right hand.   At this point, he may benefit from interventions to give him some additional relief for his pain.    PLAN:  - I have stressed the importance of physical activity and a home exercise plan to help with pain and improve health.  - Patient can continue with medications for now since they are providing benefits, using them appropriately, and without side effects.  - xray right hand and right shoulder with patient  - right shoulder and right finger injection done today in clinic  - if he fails to get relief from these injections, he may warrant a right shoulder MRI and/or referral to hand surgery for his right hand pain  - MRI lumbar spine reviewed at length with reading radiologist. Per discussion, he does indeed  have T1 and T2 hyperintensity along the endplates of L4, L5, and S1, consistent with chronic degenerative endplate changes.  - schedule for bilateral L5/S1 transforaminal injection; he may be a candidate for L4, L5, and S1 intracept procedure  - I counseled on maintaining his home exercise program  - Counseled patient regarding the importance of activity modification and physical therapy.  - RTC after epidural injection    The above plan and management options were discussed at length with patient. Patient is in agreement with the above and verbalized understanding.    Any Thomas  06/21/2024

## 2024-06-21 NOTE — PROCEDURES
Intermediate Joint Aspiration/Injection    Date/Time: 6/21/2024 1:00 PM    Performed by: Any Thomas MD  Authorized by: Any Tohmas MD    Consent Done?:  Yes (Written)  Indications:  Pain  Timeout: Prior to procedure the correct patient, procedure, and site was verified      Location:  Shoulder  Acromioclavicular joint: Right Subacromial bursa.  Prep: Patient was prepped and draped in usual sterile fashion    Needle size:  22 G  Approach:  Anterolateral  Medications:  40 mg triamcinolone acetonide 40 mg/mL     Additional Comments: Also, completed right 1st digit distal interphalangeal joint injection with 1cc of steroid/local mixture via ultrasound

## 2024-06-24 ENCOUNTER — TELEPHONE (OUTPATIENT)
Dept: PAIN MEDICINE | Facility: CLINIC | Age: 59
End: 2024-06-24
Payer: COMMERCIAL

## 2024-06-24 NOTE — TELEPHONE ENCOUNTER
----- Message from Sangeeta Ann MA sent at 6/21/2024  4:53 PM CDT -----  Can you call and schedule pt for his inj pls ? Thank youu  ----- Message -----  From: Roe Lao  Sent: 6/21/2024   4:45 PM CDT  To: William Agarwal Staff    Type:  Patient Returning Call    Who Called:     Who Left Message for Patient:     Does the patient know what this is regarding?: missed call     Best Call Back Number:   [phone]    Additional Information:

## 2024-06-25 ENCOUNTER — TELEPHONE (OUTPATIENT)
Dept: PAIN MEDICINE | Facility: CLINIC | Age: 59
End: 2024-06-25
Payer: COMMERCIAL

## 2024-07-01 NOTE — PRE-PROCEDURE INSTRUCTIONS
Patient reviewed on 6/28/2024.  Okay to proceed at [place]. The following pre-procedure instructions and arrival time have been reviewed with patient via phone and sent to patient portal for review.  Patient verbalized an understanding.  Pt to be accompanied by his wife day of procedure as responsible .     Dear Clavin ,     You are scheduled for a pain procedure on 7/2/24 with Dr. Thomas, please report to [facility]   [address] [facility].     Please park in the lot in front of the building and enter at the main entrance. Proceed to the second floor for registration.     Arrival time: 6 am     Anesthesia fasting instructions:   IV sedation. You should not eat for 8 hours and can only drink clear liquids (water or black coffee without cream/sugar) up until 2 hours before your scheduled time.  You CANNOT drive yourself and must have a .     *Refrain from drinking any alcohol  *Please insure that you have pre-planned your ride home IF YOU ARE to have sedation of any kind You CANNOT drive yourself home.    *You may not leave alone in an uber, taxi, etc. IF YOU HAVE received sedating medications by mouth or by vein  *You must be discharged to a responsible adult.   *Please remain under adult supervision for 24 hours if you had any form of sedation.      If possible, please have your visitor &/or ride home stay during your visit.   The surgeon should speak with your visitors after your procedure.  All visitors must be 18 years of age or older. Please limit your visitors to max of 2 people.  Please plan on being here for roughly 2-3 hours.   If you are on blood thinners, you need to follow the anticoagulation instructions that had been discussed previously.     IF you were told to stop your blood thinners, this is how long you should generally hold some of the more common ones.  Remember that stopping blood thinners is only necessary for certain procedures. If you are unsure of your  instructions, please call us.      You should take any medications that you routinely take for blood pressure, heart medications, thyroid, cholesterol, etc with small sips of water.   If you are a diabetic, do not take your medication if you will be fasting, but bring it with you.   Please plan on being here for roughly 2 hours.   HOLD vitamins and supplements the morning of your procedure.  HOLD any Anticoagulants (ex. Aspirin, goody or BC powder, Coumadin, Eliquis, Heparin, Plavix, Lovenox, Xarelto, etc.) for a total of 5 days  HOLD any non-insulin injections until after procedure (Ozempic, Mounjaro, Trulicity, Victoza, Byetta, Wegovy, Adlyxin, etc) (you should have been instructed to hold for a 7 day period)     Shower the night before and the morning of your procedure with antibacterial soap (ex. Dial)   Please do not use antibacterial soap to wash your face. Use your regular face soap.  Do not apply any products to your skin nor your hair after you shower the morning of your procedure.         Products include: lotions, oils, ointments, creams, gels, powders, lotion, deodorant, make-up, perfume/cologne, after shave and sunscreen.        No contacts. Bring glasses if necessary.  If you have dentures, please bring a case.  Please leave all jewelry and valuables at home.  Wear loose comfortable clothes (preferably an button up shirt), All patients will need to be placed in a gown for procedure.     ---If you start to feel sick (fever, chills, coughing, sore throat, etc) or start on or have been taking any antibiotics, please contact your doctor's office at [phone] your procedure would have to be rescheduled.      Please reply to this message as receipt of delivery.     Thanks,  Catina, LPN [facility]  Pre-Admit Testing

## 2024-07-02 ENCOUNTER — HOSPITAL ENCOUNTER (OUTPATIENT)
Facility: HOSPITAL | Age: 59
Discharge: HOME | End: 2024-07-02
Attending: STUDENT IN AN ORGANIZED HEALTH CARE EDUCATION/TRAINING PROGRAM | Admitting: STUDENT IN AN ORGANIZED HEALTH CARE EDUCATION/TRAINING PROGRAM
Payer: COMMERCIAL

## 2024-07-02 VITALS
HEIGHT: 71 IN | BODY MASS INDEX: 24.5 KG/M2 | OXYGEN SATURATION: 97 % | RESPIRATION RATE: 18 BRPM | DIASTOLIC BLOOD PRESSURE: 71 MMHG | HEART RATE: 65 BPM | WEIGHT: 175 LBS | TEMPERATURE: 99 F | SYSTOLIC BLOOD PRESSURE: 109 MMHG

## 2024-07-02 DIAGNOSIS — G89.29 CHRONIC PAIN: ICD-10-CM

## 2024-07-02 DIAGNOSIS — M54.16 LUMBAR RADICULOPATHY: Primary | ICD-10-CM

## 2024-07-02 PROCEDURE — 25500020 PHARM REV CODE 255: Performed by: STUDENT IN AN ORGANIZED HEALTH CARE EDUCATION/TRAINING PROGRAM

## 2024-07-02 PROCEDURE — 64483 NJX AA&/STRD TFRM EPI L/S 1: CPT | Mod: 50,,, | Performed by: STUDENT IN AN ORGANIZED HEALTH CARE EDUCATION/TRAINING PROGRAM

## 2024-07-02 PROCEDURE — 64483 NJX AA&/STRD TFRM EPI L/S 1: CPT | Mod: 50 | Performed by: STUDENT IN AN ORGANIZED HEALTH CARE EDUCATION/TRAINING PROGRAM

## 2024-07-02 PROCEDURE — 63600175 PHARM REV CODE 636 W HCPCS: Performed by: STUDENT IN AN ORGANIZED HEALTH CARE EDUCATION/TRAINING PROGRAM

## 2024-07-02 PROCEDURE — 25000003 PHARM REV CODE 250: Performed by: STUDENT IN AN ORGANIZED HEALTH CARE EDUCATION/TRAINING PROGRAM

## 2024-07-02 RX ORDER — LIDOCAINE HYDROCHLORIDE 20 MG/ML
INJECTION, SOLUTION EPIDURAL; INFILTRATION; INTRACAUDAL; PERINEURAL
Status: DISCONTINUED | OUTPATIENT
Start: 2024-07-02 | End: 2024-07-02 | Stop reason: HOSPADM

## 2024-07-02 RX ORDER — DEXAMETHASONE SODIUM PHOSPHATE 10 MG/ML
INJECTION INTRAMUSCULAR; INTRAVENOUS
Status: DISCONTINUED | OUTPATIENT
Start: 2024-07-02 | End: 2024-07-02 | Stop reason: HOSPADM

## 2024-07-02 RX ORDER — MIDAZOLAM HYDROCHLORIDE 1 MG/ML
INJECTION INTRAMUSCULAR; INTRAVENOUS
Status: DISCONTINUED | OUTPATIENT
Start: 2024-07-02 | End: 2024-07-02 | Stop reason: HOSPADM

## 2024-07-02 RX ORDER — LIDOCAINE HYDROCHLORIDE 10 MG/ML
INJECTION, SOLUTION EPIDURAL; INFILTRATION; INTRACAUDAL; PERINEURAL
Status: DISCONTINUED | OUTPATIENT
Start: 2024-07-02 | End: 2024-07-02 | Stop reason: HOSPADM

## 2024-07-02 RX ORDER — FENTANYL CITRATE 50 UG/ML
INJECTION, SOLUTION INTRAMUSCULAR; INTRAVENOUS
Status: DISCONTINUED | OUTPATIENT
Start: 2024-07-02 | End: 2024-07-02 | Stop reason: HOSPADM

## 2024-07-02 RX ORDER — SODIUM CHLORIDE 9 MG/ML
500 INJECTION, SOLUTION INTRAVENOUS CONTINUOUS
Status: DISCONTINUED | OUTPATIENT
Start: 2024-07-02 | End: 2024-07-02 | Stop reason: HOSPADM

## 2024-07-02 RX ORDER — LIDOCAINE HYDROCHLORIDE 10 MG/ML
1 INJECTION, SOLUTION EPIDURAL; INFILTRATION; INTRACAUDAL; PERINEURAL ONCE
Status: DISCONTINUED | OUTPATIENT
Start: 2024-07-02 | End: 2024-07-02 | Stop reason: HOSPADM

## 2024-07-02 NOTE — INTERVAL H&P NOTE
The patient was examined and no significant changes were noted from the updated H&P or last clinic note.    The risks and benefits of this procedure, including alternative therapies, were discussed with the patient.  The discussion of risks included infection, bleeding, need for additional procedures or surgery, nerve damage, paralysis, adverse medication reaction(s), stroke, and if appropriate for the procedure, death.  Questions regarding the procedure, risks, expected outcome, and possible side effects were solicited and answered to Calvin's satisfaction.  Calvin Smith wishes to proceed with the injection or procedure as confirmed by written consent.

## 2024-07-02 NOTE — PLAN OF CARE
VSS. Consent pending. All patient concerns addressed. Patient's spouse available for ride home. Patient belongings placed behind stretcher. Pre procedure complete. Call light in reach of patient. Side rails up x2.

## 2024-07-02 NOTE — DISCHARGE SUMMARY
Discharge Note  Short Stay      SUMMARY     Admit Date: 7/2/2024    Attending Physician: Any Thomas MD PhD    Discharge Physician: Any Thomas      Discharge Date: 7/2/2024 7:16 AM    Procedure(s) (LRB):  Bilateral L5/S1 TFESI (Bilateral)    Final Diagnosis: Lumbar radiculopathy [M54.16]    Disposition: Home or self care    Patient Instructions:   Current Discharge Medication List        CONTINUE these medications which have NOT CHANGED    Details   atorvastatin (LIPITOR) 10 MG tablet TAKE 1 TABLET ONCE DAILY  Qty: 90 tablet, Refills: 3    Associated Diagnoses: Dyslipidemia      fluticasone propionate (FLONASE) 50 mcg/actuation nasal spray SHAKE LIQUID AND USE 1 SPRAY IN EACH NOSTRIL TWICE DAILY  Qty: 48 g, Refills: 3      ibuprofen (ADVIL,MOTRIN) 600 MG tablet Take 1 tablet (600 mg total) by mouth every 8 (eight) hours as needed for Pain.  Qty: 90 tablet, Refills: 2    Associated Diagnoses: Lumbar spondylosis; Spondylolisthesis of lumbar region      metFORMIN (GLUCOPHAGE-XR) 500 MG ER 24hr tablet TAKE 1 TABLET DAILY WITH   BREAKFAST  Qty: 90 tablet, Refills: 3    Associated Diagnoses: Prediabetes      sertraline (ZOLOFT) 25 MG tablet Take 1 tablet (25 mg total) by mouth once daily.  Qty: 90 tablet, Refills: 3    Associated Diagnoses: Anxiety      aspirin (ECOTRIN) 81 MG EC tablet Take 1 tablet (81 mg total) by mouth once daily.  Qty: 90 tablet, Refills: 3      LIDOcaine (LIDODERM) 5 % Place 2 patches onto the skin once daily. Remove & Discard patch within 12 hours or as directed by MD  Qty: 15 patch, Refills: 0      scopolamine (TRANSDERM-SCOP) 1.3-1.5 mg (1 mg over 3 days) Place 1 patch onto the skin every 72 hours.  Qty: 10 patch, Refills: 0      SOOLANTRA 1 % Crea Apply topically.      walker Misc 1 each by Misc.(Non-Drug; Combo Route) route once. Rolling walker for 1 dose  Qty: 1 each, Refills: 0                 Discharge Diagnosis: Lumbar radiculopathy [M54.16]  Condition on Discharge: Stable with no  complications to procedure   Diet on Discharge: Same as before.  Activity: as per instruction sheet.  Discharge to: Home with a responsible adult.  Follow up: 2-4 weeks       Please call my office or pager at [phone] if experienced any weakness or loss of sensation, fever > 101.5, pain uncontrolled with oral medications, persistent nausea/vomiting/or diarrhea, redness or drainage from the incisions, or any other worrisome concerns. If physician on call was not reached or could not communicate with our office for any reason please go to the nearest emergency department      Any Thomas MD PhD

## 2024-07-02 NOTE — OP NOTE
Lumbar Transforaminal Epidural Steroid Injection under Fluoroscopic Guidance    The procedure, risks, benefits, and options were discussed with the patient. There are no contraindications to the procedure. The patent expressed understanding and agreed to the procedure. Informed written consent was obtained prior to the start of the procedure and can be found in the patient's chart.    PATIENT NAME: Calvin Smith   MRN: [id number]     DATE OF PROCEDURE: 07/02/2024    PROCEDURE:  Bilateral  L5/S1 Lumbar Transforaminal Epidural Steroid Injection under Fluoroscopic Guidance    PRE-OP DIAGNOSIS: Lumbar radiculopathy [M54.16] Lumbar radiculopathy [M54.16], Lumbar degenerative disc disease    POST-OP DIAGNOSIS: Same    PHYSICIAN: Any Thomas MD    ASSISTANTS: None     MEDICATIONS INJECTED: Preservative-free Decadron 10mg with 5cc of Lidocaine 1% MPF     LOCAL ANESTHETIC INJECTED: Xylocaine 2%     SEDATION: Versed 2mg and Fentanyl 50mcg                                                                                                                                                                                     Conscious sedation ordered by M.D. Patient re-evaluation prior to administration of conscious sedation. No changes noted in patient's status from initial evaluation. The patient's vital signs were monitored by RN and patient remained hemodynamically stable throughout the procedure.    Event Time In   Sedation Start 0710   Sedation End 0715       ESTIMATED BLOOD LOSS: None    COMPLICATIONS: None    TECHNIQUE: Time-out was performed to identify the patient and procedure to be performed. With the patient laying in a prone position, the surgical area was prepped and draped in the usual sterile fashion using ChloraPrep and a fenestrated drape.The levels were determined under fluoroscopy guidance. Skin anesthesia was achieved by injecting Lidocaine 2% over the injection sites. The transforaminal spaces were then approached  with a 22 gauge, 5 inch spinal quinke needle that was introduced under fluoroscopic guidance in the AP and Lateral views. Once the needle tip was in the area of the transforaminal space, and there was no blood, CSF or paraesthesias, contrast dye Omnipaque (300mg/mL) was injected to confirm placement and there was no vascular runoff. Fluoroscopic imaging in the AP and lateral views revealed a clear outline of the spinal nerve with proximal spread of agent through the neural foramen into the epidural space. 3 mL of the medication mixture listed above was injected slowly at each site. Displacement of the radio opaque contrast after injection of the medication confirmed that the medication went into the area of the transforaminal spaces. The needles were removed and bleeding was nil. A sterile dressing was applied. No specimens collected. The patient tolerated the procedure well.     The patient was monitored after the procedure in the recovery area. They were given post-procedure and discharge instructions to follow at home. The patient was discharged in a stable condition.    Any Thomas MD

## 2024-07-02 NOTE — PLAN OF CARE
Discharge instructions given to patient,  verbalized understanding of all.  VSS, denies n/v and tolerating PO, rates pain level tolerable. IV removed, and Family notified for Patient discharge home.

## 2024-07-03 ENCOUNTER — TELEPHONE (OUTPATIENT)
Dept: PAIN MEDICINE | Facility: CLINIC | Age: 59
End: 2024-07-03
Payer: COMMERCIAL

## 2024-07-30 ENCOUNTER — PATIENT MESSAGE (OUTPATIENT)
Dept: INTERNAL MEDICINE | Facility: CLINIC | Age: 59
End: 2024-07-30
Payer: COMMERCIAL

## 2024-08-20 ENCOUNTER — PATIENT MESSAGE (OUTPATIENT)
Dept: ADMINISTRATIVE | Facility: HOSPITAL | Age: 59
End: 2024-08-20
Payer: COMMERCIAL

## 2024-09-03 ENCOUNTER — PATIENT OUTREACH (OUTPATIENT)
Dept: ADMINISTRATIVE | Facility: HOSPITAL | Age: 59
End: 2024-09-03
Payer: COMMERCIAL

## 2024-09-03 NOTE — LETTER
AUTHORIZATION FOR RELEASE OF   CONFIDENTIAL INFORMATION        We are seeing Calvin Smith, date of birth 1965, in the clinic at [facility]. Dc Fitzgerald MD is the patient's PCP. Calvin Smith has an outstanding lab/procedure at the time we reviewed his chart. In order to help keep his health information updated, he has authorized us to request the following medical record(s):        (  )  MAMMOGRAM                                      ( x )  COLONOSCOPY      (  )  PAP SMEAR                                          (  )  OUTSIDE LAB RESULTS     (  )  DEXA SCAN                                          (  )  EYE EXAM            (  )  FOOT EXAM                                          (  )  ENTIRE RECORD     (  )  OUTSIDE IMMUNIZATIONS                 (  )  _______________         Please fax records to [facility], Imsais, Richard K., MD,[phone]     If you have any questions, please contact Sharmin at [phone]          Patient Name: Calvin Smith  : 1965  Patient Phone #: [phone]

## 2024-09-03 NOTE — PROGRESS NOTES
Chart reviewed  Immunizations reconciled   Release sent to obtain colonoscopy  Portal message sent

## 2024-09-04 ENCOUNTER — TELEPHONE (OUTPATIENT)
Dept: INTERNAL MEDICINE | Facility: CLINIC | Age: 59
End: 2024-09-04
Payer: COMMERCIAL

## 2024-09-04 DIAGNOSIS — Z86.010 PERSONAL HISTORY OF COLONIC POLYPS: Primary | ICD-10-CM

## 2024-09-04 NOTE — TELEPHONE ENCOUNTER
----- Message from Sharmin Jay MA sent at 9/3/2024  3:55 PM CDT -----  Regarding: colon cancer screening  Pt is requesting an order for a colonoscopy.

## 2024-09-06 ENCOUNTER — PATIENT MESSAGE (OUTPATIENT)
Dept: PAIN MEDICINE | Facility: CLINIC | Age: 59
End: 2024-09-06
Payer: COMMERCIAL

## 2024-09-06 DIAGNOSIS — M25.511 CHRONIC RIGHT SHOULDER PAIN: Primary | ICD-10-CM

## 2024-09-06 DIAGNOSIS — G89.29 CHRONIC RIGHT SHOULDER PAIN: Primary | ICD-10-CM

## 2024-09-16 NOTE — TELEPHONE ENCOUNTER
Care Due:                  Date            Visit Type   Department     Provider  --------------------------------------------------------------------------------                                MYCHART                              ANNUAL                              CHECKUP/PHY  MET INTERNAL  Last Visit: 02-      [facility]       Dc Fitzgerald  Next Visit: None Scheduled  None         None Found                                                            Last  Test          Frequency    Reason                     Performed    Due Date  --------------------------------------------------------------------------------    HBA1C.......  6 months...  metFORMIN................  02- 08-    [facility] Embedded Care Due Messages. Reference number: [id number].   9/16/2024 9:20:26 AM CDT

## 2024-09-17 RX ORDER — FLUTICASONE PROPIONATE 50 MCG
SPRAY, SUSPENSION (ML) NASAL
Qty: 48 G | Refills: 1 | Status: SHIPPED | OUTPATIENT
Start: 2024-09-17

## 2024-09-17 NOTE — TELEPHONE ENCOUNTER
Provider Staff:  Action required for this patient    Requires labs      Please see care gap opportunities below in Care Due Message.    Thanks!  [facility]     Appointments      Date Provider   Last Visit   Visit date not found Dc Fitzgerald MD   Next Visit   Visit date not found Dc Fitzgerald MD     Refill Decision Note   Calvin Smith  is requesting a refill authorization.  Brief Assessment and Rationale for Refill:  Approve     Medication Therapy Plan:         Comments:     Note composed:7:12 AM 09/17/2024

## 2024-09-27 ENCOUNTER — HOSPITAL ENCOUNTER (OUTPATIENT)
Dept: RADIOLOGY | Facility: HOSPITAL | Age: 59
Discharge: HOME | End: 2024-09-27
Attending: STUDENT IN AN ORGANIZED HEALTH CARE EDUCATION/TRAINING PROGRAM
Payer: COMMERCIAL

## 2024-09-27 ENCOUNTER — TELEPHONE (OUTPATIENT)
Dept: PAIN MEDICINE | Facility: CLINIC | Age: 59
End: 2024-09-27
Payer: COMMERCIAL

## 2024-09-27 DIAGNOSIS — M25.511 CHRONIC RIGHT SHOULDER PAIN: ICD-10-CM

## 2024-09-27 DIAGNOSIS — G89.29 CHRONIC RIGHT SHOULDER PAIN: ICD-10-CM

## 2024-09-27 PROCEDURE — 73221 MRI JOINT UPR EXTREM W/O DYE: CPT | Mod: 26,RT,, | Performed by: RADIOLOGY

## 2024-09-27 PROCEDURE — 73221 MRI JOINT UPR EXTREM W/O DYE: CPT | Mod: TC,RT

## 2024-09-27 NOTE — TELEPHONE ENCOUNTER
----- Message from Any Thomas MD sent at 9/27/2024 12:28 PM CDT -----  Can someone call him to set up a follow up appointment?    Thanks!    Any Thomas  ----- Message -----  From: Interface, Rad Results In  Sent: 9/27/2024   9:41 AM CDT  To: Any Thomas MD

## 2024-09-28 ENCOUNTER — PATIENT MESSAGE (OUTPATIENT)
Dept: INTERNAL MEDICINE | Facility: CLINIC | Age: 59
End: 2024-09-28
Payer: COMMERCIAL

## 2024-09-30 ENCOUNTER — TELEPHONE (OUTPATIENT)
Dept: PAIN MEDICINE | Facility: CLINIC | Age: 59
End: 2024-09-30
Payer: COMMERCIAL

## 2024-09-30 ENCOUNTER — OFFICE VISIT (OUTPATIENT)
Dept: PAIN MEDICINE | Facility: CLINIC | Age: 59
End: 2024-09-30
Payer: COMMERCIAL

## 2024-09-30 ENCOUNTER — TELEPHONE (OUTPATIENT)
Dept: PAIN MEDICINE | Facility: CLINIC | Age: 59
End: 2024-09-30

## 2024-09-30 ENCOUNTER — PATIENT MESSAGE (OUTPATIENT)
Dept: RADIOLOGY | Facility: HOSPITAL | Age: 59
End: 2024-09-30
Payer: COMMERCIAL

## 2024-09-30 VITALS
BODY MASS INDEX: 24.41 KG/M2 | OXYGEN SATURATION: 100 % | RESPIRATION RATE: 18 BRPM | SYSTOLIC BLOOD PRESSURE: 115 MMHG | TEMPERATURE: 98 F | DIASTOLIC BLOOD PRESSURE: 77 MMHG | WEIGHT: 175.06 LBS

## 2024-09-30 DIAGNOSIS — M25.511 CHRONIC RIGHT SHOULDER PAIN: ICD-10-CM

## 2024-09-30 DIAGNOSIS — G89.29 CHRONIC RIGHT SHOULDER PAIN: ICD-10-CM

## 2024-09-30 DIAGNOSIS — M75.111 NONTRAUMATIC INCOMPLETE TEAR OF RIGHT ROTATOR CUFF: Primary | ICD-10-CM

## 2024-09-30 PROCEDURE — 1160F RVW MEDS BY RX/DR IN RCRD: CPT | Mod: CPTII,S$GLB,, | Performed by: STUDENT IN AN ORGANIZED HEALTH CARE EDUCATION/TRAINING PROGRAM

## 2024-09-30 PROCEDURE — 3008F BODY MASS INDEX DOCD: CPT | Mod: CPTII,S$GLB,, | Performed by: STUDENT IN AN ORGANIZED HEALTH CARE EDUCATION/TRAINING PROGRAM

## 2024-09-30 PROCEDURE — 1159F MED LIST DOCD IN RCRD: CPT | Mod: CPTII,S$GLB,, | Performed by: STUDENT IN AN ORGANIZED HEALTH CARE EDUCATION/TRAINING PROGRAM

## 2024-09-30 PROCEDURE — 3044F HG A1C LEVEL LT 7.0%: CPT | Mod: CPTII,S$GLB,, | Performed by: STUDENT IN AN ORGANIZED HEALTH CARE EDUCATION/TRAINING PROGRAM

## 2024-09-30 PROCEDURE — 99999 PR PBB SHADOW E&M-EST. PATIENT-LVL III: CPT | Mod: PBBFAC,,, | Performed by: STUDENT IN AN ORGANIZED HEALTH CARE EDUCATION/TRAINING PROGRAM

## 2024-09-30 PROCEDURE — 3074F SYST BP LT 130 MM HG: CPT | Mod: CPTII,S$GLB,, | Performed by: STUDENT IN AN ORGANIZED HEALTH CARE EDUCATION/TRAINING PROGRAM

## 2024-09-30 PROCEDURE — 3078F DIAST BP <80 MM HG: CPT | Mod: CPTII,S$GLB,, | Performed by: STUDENT IN AN ORGANIZED HEALTH CARE EDUCATION/TRAINING PROGRAM

## 2024-09-30 PROCEDURE — 99214 OFFICE O/P EST MOD 30 MIN: CPT | Mod: S$GLB,,, | Performed by: STUDENT IN AN ORGANIZED HEALTH CARE EDUCATION/TRAINING PROGRAM

## 2024-09-30 NOTE — PROGRESS NOTES
Chronic patient Established Note (Follow up visit)      SUBJECTIVE:    Calvin Smith presents to the clinic for a follow-up appointment for chronic pain    Pain Disability Index Review:      6/21/2024     1:07 PM 10/31/2023     9:23 AM   Last 3 PDI Scores   Pain Disability Index (PDI) 56 0     INTERVAL HISTORY 9/30/2024:  Mr. Smith returns for right shoulder pain. Current Pain level is 10/10.  He states that his lower back feels much better since his epidural injection. He has difficulty at night with moving the right shoulder.  He hasn't tried taking any medications for this shoulder pain. He hasn't tried physical therapy for his right shoulder. He does report excellent relief of his right shoulder pain after his subacromial bursa injection on 6/21/24, but reports the relief was short lived. He is interested in repeating this.    INTERVAL HISTORY 6/21/2024:  Mr. Smith returns for right shoulder, right thumb, and lower back pain. Current Pain level is 9/10.  He reports that last night the shoulder was unbearable and wanted to return to clinic for a steroid injection as soon as possible.  He continues to have lower back pain, and is tentatively scheduled for a bilateral L5/S1 transforaminal epidural steroid injection for vertebrogenic pain.    INTERVAL HISTORY 6/20/2024:  Ms. Lau presents for chronic back pain. Current Pain level is 9/10.  He reports that he continues to do his home exercises and feels that it does help a little. However, he is noticing diminishing returns. He continues to feel the pain is across his lower back like a band, and is worse with lifting/flexing forward, and at night while lying in bed.  He completed the healthy back program and he has continued maintaining the exercises as much as tolerated.  He also reports right shoulder pain and right hand pain which is new since his last visit.  He does report that he had similar left hand pain which resolved after a steroid  injection.    INTERVAL HISTORY 9/19/23:  Mr. Smith presents today with history of lower back pain. His current pain level is 0/10.  He feels his pain is better controlled at this point.  He has been playing golf more frequently now. He reports he had an event recently and had a small exacerbation which went away with his tizanidine and ibuprofen.  He feels satisfied with his improvement. He is looking forward to a golf tournament in December.  He still has his first appointment with healthy back program in mid-November.    INTERVAL HISTORY 9/19/23:  He states that he has been trying to get with physical therapy, however he has been having issues with scheduling.  He has started some exercises at home, and he feels his pain has been improving.  He reports that he has been able to take less of the tizanidine and ibuprofen. He is anxious about his hip, as he was told by a surgeon that he may have a hip fracture.  He does admit to some soreness around the left hip.  He also admits that he does sometimes wake up when he turns to the left or right side.    INITIAL HPI 9/12/23:  Calvin Smith presents to the clinic for the evaluation of lower back pain. He states he has had back pain for 20 years, however he has had exacerbations off/on.  He states that the pain is just in the lower back (no radiation down his legs). He presented to the emergency room on 9/7/23 due to the most severe exacerbation while toweling himself off after a bath.The pain is located in the lower back area and radiates to the sides of the back (not down the legs).  The pain is described as aching and is rated as 6/10. The pain is rated with a score of  0/10 on the BEST day and a score of 10/10 on the WORST day.  Symptoms interfere with daily activity. The pain is exacerbated by Extension and Flexing.  The pain is mitigated by medications.  The patient reports 4 hours of uninterrupted sleep per night (not related to pain).     Patient denies  "urinary incontinence, bowel incontinence, and significant motor weakness.      Physical Therapy/Home Exercise: yes, currently consistent with home exercise program     Pain Medications:   - ibuprofen 600mg   - robaxin 500mg      report:  Reviewed and consistent with medication use as prescribed.     Pain Procedures:   Had Prior Epidural with no relief.     Imaging:   MRI SHOULDER WITHOUT CONTRAST RIGHT     CLINICAL HISTORY:  Shoulder pain, labral tear suspected, xray done;  Pain in right shoulder     TECHNIQUE:  Multiplanar multisequence MRI examination of right shoulder.     COMPARISON:  Right shoulder radiograph 06/20/2024.     FINDINGS:  ROTATOR CUFF:     Supraspinatus: Partial tear at the footplate of the supraspinatus 2 x 7 mm with associated tendinosis.     Infraspinatus: Intact.  Distal aspect intermediate signal, consistent with tendinosis.     Subscapularis: Distal aspect intermediate signal, consistent with tendinosis.     Teres Minor: Intact.  No tendinosis.     There is small volume of fluid within the subdeltoid bursa.     LABRUM: Superior labrum appears grossly intact on this standard non arthrogram exam.Posterior aspect of superior labrum ( "peel-back" location) appears intact. Posterior labrum tear at the level of the mid equator.  Anterior inferior labrum appears intact. IGHL:Intact.     LONG HEAD BICEPS TENDON: Located within bicipital groove and intact yet attenuated.Biceps-labral anchor is intact. Intermediate signal, consistent with tendinosis.  No tenosynovitis. Rotator Interval is normal. Biceps pulley is intact.     BONES: No evident fracture.Mild bone marrow edema of the acromioclavicular joint.  AC joint demonstrates normal alignment with moderate hypertrophy.No significant osteo-acromial outlet narrowing..  There is no evident os acromiale.     CARTILAGE: Humeral head and glenoid cartilage preserved without focal defects. No subchondral marrow edema.  No synovial abnormality or " intra-articular loose bodies. Glenoid fossa demonstrates no sclerosis.     MUSCLES:  Mild fatty atrophy of the subscapularis muscle and teres minor muscle.  The remainder of the muscle bulk is within normal limits.     Impression:     1. Partial thickness tear at the articular aspect of the footplate of the supraspinatus tendon.  2. Tendinosis of the infraspinatus, subscapularis, and biceps tendons.  3. Mild fatty atrophy of the subscapularis and teres minor muscles.  4. Posterior labral tear mid equator level.     Electronically signed by resident: Paulette Archer  Date:                                            09/27/2024  Time:                                           08:29     Electronically signed by:Dc Quiñones MD  Date:                                            09/27/2024  Time:                                           09:39    XR SHOULDER TRAUMA 3 VIEW RIGHT     CLINICAL HISTORY:  Unspecified injury of right shoulder and upper arm, subsequent encounter     TECHNIQUE:  Three or four views of the right shoulder were performed.     COMPARISON:  None     FINDINGS:  Bones are well mineralized.  The glenohumeral joint and AC joint appear intact.  No fracture or dislocation is seen.  No significant degenerative changes.  No soft tissue abnormality.     Impression:     No acute abnormality        Electronically signed by:Eduardo Armstrong MD  Date:                                            06/20/2024  Time:                                           12:02    XR HAND COMPLETE 3 VIEW RIGHT     CLINICAL HISTORY:  Pain in right hand     TECHNIQUE:  PA, lateral, and oblique views of the right hand were performed.     COMPARISON:  None     FINDINGS:  Bones are well mineralized.  Alignment is satisfactory and joint spaces appear adequately maintained.  No fracture, dislocation, or erosive change.  No significant degenerative changes.  No soft tissue abnormality.     Impression:     No acute abnormality        Electronically  signed by:Eduardo Armstrong MD  Date:                                            06/20/2024  Time:                                           12:04    XR HIPS BILATERAL 2 VIEW INCL AP PELVIS     CLINICAL HISTORY:  Trochanteric bursitis, unspecified hip     TECHNIQUE:  AP view of the pelvis and frogleg lateral views of both hips were performed.     COMPARISON:  None     FINDINGS:  Femoral heads are well located with respect to the acetabula.  Femoral heads maintain a normal round contour.  No acute fracture seen.  Mild osteophyte formation on the left without significant joint space narrowing.     Impression:     No acute osseous abnormality seen.        Electronically signed by: Jacque Oropeza  Date:                                            09/19/2023  Time:                                           10:21    MRI LUMBAR SPINE WITHOUT CONTRAST     CLINICAL HISTORY:  Spinal stenosis, lumbar;     TECHNIQUE:  Multiplanar, multisequence MR images were acquired from the thoracolumbar junction to the sacrum without contrast.     COMPARISON:  CT 09/06/2023, x-ray 09/06/2023     FINDINGS:  Alignment: Mild levo scoliosis.  Minimal grade 1 retrolisthesis L4-5.  Bilateral spondylolysis L5 with grade 1 anterolisthesis L5-S1.     Vertebrae: Benign osseous hemangioma at L4.  Chronic degenerative endplate change at L4-L5 and L5-S1.     Discs: Degenerative disc desiccation from L3-L4 through L5-S1 with mild L4-L5 and severe L5-S1 height loss.     Cord: Conus terminates at L1 and appears unremarkable.  Cauda equina appears unremarkable.     Degenerative findings:     *T12-L1: No spinal canal stenosis or neural foraminal narrowing.  *L1-L2: No spinal canal stenosis or neural foraminal narrowing.  *L2-L3: No spinal canal stenosis or neural foraminal narrowing.  *L3-L4: Mild broad-based posterior disc bulge with right subarticular protrusion that causes mass effect on the right lateral recess and may impinge upon the right descending L4 nerve  root.  Bilateral facet arthropathy and bilateral ligamentum flavum hypertrophy.  Mild spinal canal and moderate right lateral recess stenosis.  *L4-L5: Circumferential disc bulge.  Bilateral facet arthropathy and bilateral ligamentum flavum hypertrophy.  Mild-to-moderate bilateral lateral recess stenosis with possible impingement of the right descending L5 nerve root.  Moderate right and mild left neural foraminal narrowing.  *L5-S1: Grade 1 anterolisthesis with uncovering of the intervertebral disc.  Bilateral facet arthropathy.  No spinal canal stenosis.  Moderate to severe bilateral neural foraminal narrowing with suspected impingement of the exiting L5 nerve roots.  Paraspinal muscles & soft tissues: Right simple renal cyst.     Impression:     1. Bilateral L5 spondylolysis with grade 1 anterolisthesis of L5 on S1 contributing to moderate to severe bilateral neural foraminal narrowing with suspected impingement of the exiting L5 nerve roots.  2. Additional lumbar spondylosis at L3-L4 and L4-L5 as detailed above.     Electronically signed by resident: Phil Mccray  Date:                                            09/07/2023  Time:                                           14:11     Electronically signed by: Myles Orozco MD  Date:                                            09/07/2023  Time:                                           15:43     CT LUMBAR SPINE WITHOUT CONTRAST     CLINICAL HISTORY:  Low back pain, no red flags, no prior management;     TECHNIQUE:  Low-dose axial, sagittal and coronal reformations are obtained through the lumbar spine.  Contrast was not administered.     COMPARISON:  X-ray 09/06/2023     FINDINGS:  No acute fractures of the lumbar spine.     Moderate disc space narrowing at L5-S1.  Grade 1 spondylolisthesis of L5 on S1.  Minimal grade 1 retrolisthesis of L4 on L5.     Bilateral spondylolysis of L5 with associated facet arthropathy at L5-S1 and L4-5.     L1-2: No significant  abnormality.     L2-3: No significant abnormality.     L3-4: Mild diffuse disc protrusion.  Mild ligamentum flavum hypertrophy.  Severe central canal narrowing.  Neural foramen are adequately maintained.     L4-5: Mild diffuse posterior disc osteophyte complex with mild disc protrusion.  Bilateral facet arthropathy.  Mild-moderate central canal narrowing.  Severe right foraminal narrowing.     L5-S1: Bilateral spondylolysis with grade 1 spondylolisthesis and slight uncovering of the disc posteriorly with mild protrusion.  Severe bilateral foraminal narrowing.  Central canal is adequately maintained.     The remaining visualized paravertebral structures demonstrate no pathology.     Impression:     No acute abnormality.     Multilevel chronic and degenerative changes.  See above comments.        Electronically signed by: Owen Sinclair  Date:                                            09/06/2023  Time:                                           20:05     XR LUMBAR SPINE AP AND LATERAL     CLINICAL HISTORY:  low back pain;     TECHNIQUE:  AP, lateral and spot images were performed of the lumbar spine.     COMPARISON:  None     FINDINGS:  Five lumbar vertebral bodies.  Vertebral body heights are maintained.     Disc space narrowing and endplate changes L5-S1.     Grade 1 anterolisthesis L5-S1 suspected to be on account of pars defects.     Impression:     Please see above.        Electronically signed by: Jacque Oropeza  Date:                                            09/06/2023  Time:                                           16:35    Allergies: Review of patient's allergies indicates:  No Known Allergies    Current Medications:   Current Outpatient Medications   Medication Sig Dispense Refill    aspirin (ECOTRIN) 81 MG EC tablet Take 1 tablet (81 mg total) by mouth once daily. 90 tablet 3    atorvastatin (LIPITOR) 10 MG tablet TAKE 1 TABLET ONCE DAILY 90 tablet 3    fluticasone propionate (FLONASE) 50 mcg/actuation  nasal spray SHAKE LIQUID AND USE 1 SPRAY IN EACH NOSTRIL TWICE DAILY 48 g 1    ibuprofen (ADVIL,MOTRIN) 600 MG tablet Take 1 tablet (600 mg total) by mouth every 8 (eight) hours as needed for Pain. 90 tablet 2    LIDOcaine (LIDODERM) 5 % Place 2 patches onto the skin once daily. Remove & Discard patch within 12 hours or as directed by MD 15 patch 0    metFORMIN (GLUCOPHAGE-XR) 500 MG ER 24hr tablet TAKE 1 TABLET DAILY WITH   BREAKFAST 90 tablet 3    scopolamine (TRANSDERM-SCOP) 1.3-1.5 mg (1 mg over 3 days) Place 1 patch onto the skin every 72 hours. 10 patch 0    sertraline (ZOLOFT) 25 MG tablet Take 1 tablet (25 mg total) by mouth once daily. 90 tablet 3    SOOLANTRA 1 % Crea Apply topically.      walker Misc 1 each by Misc.(Non-Drug; Combo Route) route once. Rolling walker for 1 dose 1 each 0     No current facility-administered medications for this visit.       REVIEW OF SYSTEMS:    GENERAL:  No weight loss, malaise or fevers.  HEENT:  Negative for frequent or significant headaches.  NECK:  Negative for lumps, goiter, pain and significant neck swelling.  RESPIRATORY:  Negative for cough, wheezing or shortness of breath.  CARDIOVASCULAR:  Negative for chest pain, leg swelling or palpitations.  GI:  Negative for abdominal discomfort, blood in stools or black stools or change in bowel habits.  MUSCULOSKELETAL:  See HPI.  SKIN:  Negative for lesions, rash, and itching.  PSYCH:  Negative for sleep disturbance, mood disorder and recent psychosocial stressors.  HEMATOLOGY/LYMPHOLOGY:  Negative for prolonged bleeding, bruising easily or swollen nodes.  NEURO:   No history of headaches, syncope, paralysis, seizures or tremors.  All other reviewed and negative other than HPI.    Past Medical History:  Past Medical History:   Diagnosis Date    Anxiety     Colon polyp     Fatty liver     TIA (transient ischemic attack)        Past Surgical History:  Past Surgical History:   Procedure Laterality Date    APPENDECTOMY  2005     COLONOSCOPY  11/2019    EPIDURAL STEROID INJECTION INTO LUMBAR SPINE  2018    INJECTION OF ANESTHETIC AGENT AROUND MEDIAL BRANCH NERVES INNERVATING LUMBAR FACET JOINT  2019    INJECTION, SPINE, LUMBOSACRAL, TRANSFORAMINAL APPROACH Bilateral 7/2/2024    Procedure: Bilateral L5/S1 TFESI;  Surgeon: Any Thomas MD;  Location: [facility];  Service: Pain Management;  Laterality: Bilateral;  20 mins  ASA 5 days       Family History:  Family History   Problem Relation Name Age of Onset    No Known Problems Mother      Leukemia Father      No Known Problems Sister      No Known Problems Brother      No Known Problems Sister      No Known Problems Brother         Social History:  Social History     Socioeconomic History    Marital status:      Spouse name: Leigh Reese    Number of children: 2   Tobacco Use    Smoking status: Never    Smokeless tobacco: Never   Substance and Sexual Activity    Drug use: Never    Sexual activity: Yes   Social History Narrative    He is satisfied with weight.    Rates diet as fair to good.    He does not drink at least 1/2 gallon water daily.    He drinks 1-2 coffee/tea/caffeine-containing soft drinks daily.    Total sleep time at night is 6-7 hours (poor quality).    He works 40-50 hours per week.    He does wear seat belts.    Hobbies include tennis.     Social Drivers of Health     Financial Resource Strain: Low Risk  (9/7/2023)    Overall Financial Resource Strain (CARDIA)     Difficulty of Paying Living Expenses: Not hard at all   Food Insecurity: No Food Insecurity (9/7/2023)    Hunger Vital Sign     Worried About Running Out of Food in the Last Year: Never true     Ran Out of Food in the Last Year: Never true   Transportation Needs: No Transportation Needs (9/7/2023)    PRAPARE - Transportation     Lack of Transportation (Medical): No     Lack of Transportation (Non-Medical): No   Physical Activity: Sufficiently Active (9/7/2023)    Exercise Vital Sign     Days of  Exercise per Week: 3 days     Minutes of Exercise per Session: 60 min   Stress: No Stress Concern Present (9/7/2023)    [employer] [place] [employer] - Occupational Stress Questionnaire     Feeling of Stress : Only a little   Housing Stability: Low Risk  (9/7/2023)    Housing Stability Vital Sign     Unable to Pay for Housing in the Last Year: No     Number of Places Lived in the Last Year: 1     Unstable Housing in the Last Year: No       OBJECTIVE:    /77   Temp 98 °F (36.7 °C)   Resp 18   Wt 79.4 kg (175 lb 0.7 oz)   SpO2 100%   BMI 24.41 kg/m²     PHYSICAL EXAMINATION:  General appearance: Well appearing, in no acute distress, alert and appropriately communicative.  Psych:  Mood and affect appropriate.  Skin: Skin color, texture, turgor normal, no rashes or lesions, in both upper and lower body.  Head/face:  Atraumatic, normocephalic.  Cor: regular rate  Pulm: non-labored breathing  GI: Abdomen non-distended and non-tender.  Back: Straight leg raising in the sitting and supine positions is negative to radicular pain. No pain to palpation over the spine or paraspinal muscles. Pain with flexion  Extremities: Peripheral joint ROM is full and pain free without obvious instability or laxity in all four extremities.  No deformities, edema, or skin discoloration. Good capillary refill.  Musculoskeletal: shoulder, hip, sacroiliac and knee provocative maneuvers are negative with the exception of right painful arc 60-120deg; right empty can+, right pain on external rotation. Bilateral upper and lower extremity strength is normal and symmetric.  No atrophy or tone abnormalities are noted.  Neuro: Bilateral upper and lower extremity coordination and muscle stretch reflexes are physiologic and symmetric.  Negative Clonus. No loss of sensation is noted.  Gait: Normal.    ASSESSMENT: 59 y.o. year old male with lower back/hip pain, consistent with:     1. Nontraumatic incomplete tear of right rotator cuff   Ambulatory referral/consult to Physical/Occupational Therapy      2. Chronic right shoulder pain  Ambulatory referral/consult to Physical/Occupational Therapy        IMPRESSION: Mr. Smith presents for persistent right shoulder. History and physical exam are consistent with rotator cuff pathology.  Imaging is consistent with partial right supraspinatus tear, right labral tear, and rotator cuff tendinopathy.   He has failed some conservative measures for his right shoulder.  He reports that he finds it difficult to move his shoulder and do exercises due to the pain. We will offer a repeat subacromial bursa injection with plan for course of physical therapy.    PLAN:  - I have stressed the importance of physical activity and a home exercise plan to help with pain and improve health.  - Patient can continue with medications for now since they are providing benefits, using them appropriately, and without side effects.  - xray and MRI shoulder reviewed in detail with patient  - ok to use voltaren 1% topical for myofascial pain; do not use over open sores/wounds  - he can use ibuprofen for his pain for now  - schedule for right subacromial bursa injection  - referral to physical therapy  - I counseled on maintaining his home exercise program  - Counseled patient regarding the importance of activity modification and physical therapy.  - RTC after shoulder injection    The above plan and management options were discussed at length with patient. Patient is in agreement with the above and verbalized understanding.    Any Thomas  09/30/2024

## 2024-10-01 ENCOUNTER — PATIENT MESSAGE (OUTPATIENT)
Dept: SPINE | Facility: CLINIC | Age: 59
End: 2024-10-01
Payer: COMMERCIAL

## 2024-10-02 ENCOUNTER — PROCEDURE VISIT (OUTPATIENT)
Dept: PAIN MEDICINE | Facility: CLINIC | Age: 59
End: 2024-10-02
Payer: COMMERCIAL

## 2024-10-02 VITALS
DIASTOLIC BLOOD PRESSURE: 75 MMHG | TEMPERATURE: 99 F | OXYGEN SATURATION: 98 % | HEART RATE: 75 BPM | WEIGHT: 179.88 LBS | RESPIRATION RATE: 18 BRPM | BODY MASS INDEX: 25.09 KG/M2 | SYSTOLIC BLOOD PRESSURE: 116 MMHG

## 2024-10-02 DIAGNOSIS — M25.511 CHRONIC RIGHT SHOULDER PAIN: Primary | ICD-10-CM

## 2024-10-02 DIAGNOSIS — G89.29 CHRONIC RIGHT SHOULDER PAIN: Primary | ICD-10-CM

## 2024-10-02 RX ORDER — DICLOFENAC SODIUM 10 MG/G
2 GEL TOPICAL 4 TIMES DAILY
Qty: 100 G | Refills: 2 | Status: SHIPPED | OUTPATIENT
Start: 2024-10-02

## 2024-10-02 NOTE — PROCEDURES
atient Name: Calvin Smith  MRN: [id number]    INFORMED CONSENT: The procedure, risks, benefits and options were discussed with patient. There are no contraindications to the procedure. The patient expressed understanding and agreed to proceed. The personnel performing the procedure was discussed. I verify that I personally obtained Calvin's consent prior to the start of the procedure and the signed consent can be found on the patient's chart.    Procedure Date: 10/02/2024    Anesthesia: Topical    Pre Procedure diagnosis: * No surgery found *  1. Chronic right shoulder pain      Post-Procedure diagnosis: SAME      Moderate Sedation: None  Sedation time: Less than 15 minutes    PPROCEDURE: Left subacromial bursa injection posterior approach      DESCRIPTION OF PROCEDURE: The patient was brought to the procedure room and time out was performed. The patient was remained in sitting position. The area over the left shoulder was prepped in usual sterile fashion using chlorhexidine prep.   This was followed by advancement of a 22-gauge needle into the area of the subacromial bursa.  Once encountered, after negative aspiration, and no paresthesias, 1 mL of 40mg/mL Kenalog + 4 mL of 0.25% Bupivicaine (total volume of 5 mL) was injected into the area.  Needle was withdrawn and a sterile band-aid applied to the skin.  Blood Loss: Nill  Specimen: None      Any Thomas MD

## 2024-10-03 ENCOUNTER — OFFICE VISIT (OUTPATIENT)
Dept: INTERNAL MEDICINE | Facility: CLINIC | Age: 59
End: 2024-10-03
Payer: COMMERCIAL

## 2024-10-03 ENCOUNTER — CLINICAL SUPPORT (OUTPATIENT)
Dept: REHABILITATION | Facility: HOSPITAL | Age: 59
End: 2024-10-03
Attending: STUDENT IN AN ORGANIZED HEALTH CARE EDUCATION/TRAINING PROGRAM
Payer: COMMERCIAL

## 2024-10-03 VITALS
OXYGEN SATURATION: 97 % | DIASTOLIC BLOOD PRESSURE: 78 MMHG | BODY MASS INDEX: 25.13 KG/M2 | RESPIRATION RATE: 18 BRPM | SYSTOLIC BLOOD PRESSURE: 112 MMHG | HEART RATE: 97 BPM | HEIGHT: 70 IN | TEMPERATURE: 98 F | WEIGHT: 175.5 LBS

## 2024-10-03 DIAGNOSIS — F41.9 ANXIETY DISORDER, UNSPECIFIED TYPE: Primary | ICD-10-CM

## 2024-10-03 DIAGNOSIS — G89.29 CHRONIC RIGHT SHOULDER PAIN: ICD-10-CM

## 2024-10-03 DIAGNOSIS — M62.81 DECREASED MUSCLE STRENGTH: Primary | ICD-10-CM

## 2024-10-03 DIAGNOSIS — M25.511 CHRONIC RIGHT SHOULDER PAIN: ICD-10-CM

## 2024-10-03 DIAGNOSIS — M75.111 NONTRAUMATIC INCOMPLETE TEAR OF RIGHT ROTATOR CUFF: ICD-10-CM

## 2024-10-03 PROCEDURE — 99999 PR PBB SHADOW E&M-EST. PATIENT-LVL IV: CPT | Mod: PBBFAC,,, | Performed by: INTERNAL MEDICINE

## 2024-10-03 PROCEDURE — 3078F DIAST BP <80 MM HG: CPT | Mod: CPTII,S$GLB,, | Performed by: INTERNAL MEDICINE

## 2024-10-03 PROCEDURE — 3044F HG A1C LEVEL LT 7.0%: CPT | Mod: CPTII,S$GLB,, | Performed by: INTERNAL MEDICINE

## 2024-10-03 PROCEDURE — 97140 MANUAL THERAPY 1/> REGIONS: CPT

## 2024-10-03 PROCEDURE — 97161 PT EVAL LOW COMPLEX 20 MIN: CPT

## 2024-10-03 PROCEDURE — 99213 OFFICE O/P EST LOW 20 MIN: CPT | Mod: S$GLB,,, | Performed by: INTERNAL MEDICINE

## 2024-10-03 PROCEDURE — 3074F SYST BP LT 130 MM HG: CPT | Mod: CPTII,S$GLB,, | Performed by: INTERNAL MEDICINE

## 2024-10-03 PROCEDURE — 97110 THERAPEUTIC EXERCISES: CPT

## 2024-10-03 PROCEDURE — 3008F BODY MASS INDEX DOCD: CPT | Mod: CPTII,S$GLB,, | Performed by: INTERNAL MEDICINE

## 2024-10-03 PROCEDURE — 1159F MED LIST DOCD IN RCRD: CPT | Mod: CPTII,S$GLB,, | Performed by: INTERNAL MEDICINE

## 2024-10-03 RX ORDER — ALPRAZOLAM 0.5 MG/1
TABLET ORAL
Qty: 10 TABLET | Refills: 1 | Status: SHIPPED | OUTPATIENT
Start: 2024-10-03

## 2024-10-03 RX ORDER — AZITHROMYCIN 500 MG/1
TABLET, FILM COATED ORAL
Qty: 3 TABLET | Refills: 0 | Status: SHIPPED | OUTPATIENT
Start: 2024-10-03

## 2024-10-03 NOTE — PROGRESS NOTES
"[facility]   Physical Therapy Initial Evaluation      Name: Calvin Smith  Clinic Number: [id number]    Therapy Diagnosis:   Encounter Diagnoses   Name Primary?    Chronic right shoulder pain     Nontraumatic incomplete tear of right rotator cuff     Decreased muscle strength Yes        Physician: Any Thomas MD    Physician Orders: PT Eval and Treat   Medical Diagnosis from Referral: M25.511,G89.29 (ICD-10-CM) - Chronic right shoulder pain M75.111 (ICD-10-CM) - Nontraumatic incomplete tear of right rotator cuff  Evaluation Date: 10/3/2024  Authorization Period Expiration: 12/31/2024  Plan of Care Expiration: 12/3/2024  Progress Note Due: 11/3/2024  Date of Surgery: -  Visit # / Visits authorized: 1/ 1   FOTO: 1/ 3  Precautions: Standard, Diabetes, and History of TIA    Time In: 3:00 pm  Time Out: 4:00 pm  Total Billable Time: 60 minutes    Subjective   Mr. Smith returns for right shoulder pain. Current Pain level is 10/10.  He states that his lower back feels much better since his epidural injection. He has difficulty at night with moving the right shoulder.  He hasn't tried taking any medications for this shoulder pain. He hasn't tried physical therapy for his right shoulder. He does report excellent relief of his right shoulder pain after his subacromial bursa injection on 6/21/24, but reports the relief was short lived. He is interested in repeating this. He denies any numbness or tingling.  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 He received a shot on 10/2, which helped the pain. Otherwise he can drive, type but he cannot lift with the left arm or reach. When the arm is still, this does not hurt.  Falls: None  Imaging: MRI studies: 1. Partial thickness tear at the articular aspect " of the footplate of the supraspinatus tendon.  2. Tendinosis of the infraspinatus, subscapularis, and biceps tendons.  3. Mild fatty atrophy of the subscapularis and teres minor muscles.  4. Posterior labral tear mid equator level.    Prior Therapy: Not for this issue  Social History:  Lives with their family  Occupation: [employer]   Prior Level of Function: Independent  Current Level of Function: Cannot place suitcase up on the overcarriage of the plane, difficulty with self-care and reaching/carrying.    Pain:  Current 0/10, worst 10/10, best 0/10   Location: Right Shoulder  Description: Sharp (anterior deltoid region)  Aggravating Factors: Reaching, Lifting, Carrying  Easing Factors: Rest    Patients goals: To understand what is causing the pain with exercises to improve.     Medical History:   Past Medical History:   Diagnosis Date    Anxiety     Colon polyp     Fatty liver     TIA (transient ischemic attack)        Surgical History:   Calvin Smith  has a past surgical history that includes Colonoscopy (11/2019); Appendectomy (2005); Epidural steroid injection into lumbar spine (2018); Injection of anesthetic agent around medial branch nerves innervating lumbar facet joint (2019); and injection, spine, lumbosacral, transforaminal approach (Bilateral, 7/2/2024).    Medications:   Calvin has a current medication list which includes the following prescription(s): alprazolam, aspirin, atorvastatin, azithromycin, diclofenac sodium, fluticasone propionate, ibuprofen, lidocaine, metformin, scopolamine, sertraline, soolantra, and walker.    Allergies:   Review of patient's allergies indicates:  No Known Allergies     Objective      Shoulder Right Left Pain/Dysfunction with Movement   AROM      Flexion (scapular plane)  175 degrees  175 degrees    Internal rotation (Combined Movements)  Thoracolumbar Junction  ~T6    ER at 0° abd  70 degrees  70 degrees      Shoulder Right Pain/Dysfunction with Movement   PROM     Flexion  "(scapular plane)  175 degrees    Internal rotation @ 90 degrees  50 degrees    ER at 0° abd 80 degrees    ER at 45° abd  90 degrees    ER at 90° abd  90 degrees        U/E MMT Right Left Pain/Dysfunction with Movement   Shoulder Flexion  4/5 5/5 *Concordant Pain   Shoulder Internal Rotation 5/5 5/5    Shoulder External Rotation 4-/5 5/5    Serratus Anterior 4/5 5/5      Joint Mobility: Decreased right posterior capsule mobility, and posterior glide of the glenohumeral joint.  Special Tests:  Shoulder Right   PROM    Painful Arc  +   Resisted ER (at 0 degrees):  +   Resisted ER (at 90 degrees): +   Jhonny:  +   Scapular Assistance Test:  -     Intake Outcome Measure for FOTO Shoulder Survey    Therapist reviewed FOTO scores for Calvin Smith on 10/3/2024.   FOTO report - see Media section or FOTO account episode details.    Intake Score: TBD%         Treatment     Total Treatment time (time-based codes) separate from Evaluation: 18 minutes     Calvin received the treatments listed below:      therapeutic exercises to develop strength, endurance, ROM, flexibility, posture, and core stabilization for 8 minutes including:  E.R and I.R Walk-Out at 90 degrees of flexion, yellow band (Home exercise program)    manual therapy techniques: Joint mobilizations were applied  for 10 minutes, including:  Posterior Capsule MET, 4x6" hold  Glenohumeral Centration Mobilizations with E.R. and I.R    Patient Education and Home Exercises     Education provided:   - -Findings of evaluation and examination, and affect of these on plan for treatment  -Prognosis and expectations  -Role of PT and team-centered care for patient  -Home exercise program and expectations of therapy    Written Home Exercises Provided: Yes. Exercises were reviewed and Calvin was able to demonstrate them prior to the end of the session.  Calvin demonstrated good  understanding of the education provided. See EMR under Patient Instructions for exercises provided " during therapy sessions.    Assessment     Calvin is a 59 y.o. male referred to outpatient Physical Therapy with a medical diagnosis of M25.511,G89.29 (ICD-10-CM) - Chronic right shoulder pain M75.111 (ICD-10-CM) - Nontraumatic incomplete tear of right rotator cuff. Patient presents with limitation in reaching, lifting, carrying and self-care and home activities due to symptoms consistent with shoulder impingement as per clustered findings, painful external rotation with decreased muscle performance and scapular postural imbalance. Calvin will benefit from a customized physical therapy plan of care  to address the aforementioned impairments in an effort to improve human function and quality of life.      Patient prognosis is Good.   Patient will benefit from skilled outpatient Physical Therapy to address the deficits stated above and in the chart below, provide patient /family education, and to maximize patientt's level of independence.     Plan of care discussed with patient: Yes  Patient's spiritual, cultural and educational needs considered and patient is agreeable to the plan of care and goals as stated below:     Anticipated Barriers for therapy: None    Medical Necessity is demonstrated by the following  History  Co-morbidities and personal factors that may impact the plan of care [] LOW: no personal factors / co-morbidities  [] MODERATE: 1-2 personal factors / co-morbidities  [x] HIGH: 3+ personal factors / co-morbidities    Moderate / High Support Documentation:   Co-morbidities affecting plan of care: See Medical History    Personal Factors:   age     Examination  Body Structures and Functions, activity limitations and participation restrictions that may impact the plan of care [x] LOW: addressing 1-2 elements  [] MODERATE: 3+ elements  [] HIGH: 4+ elements (please support below)    Moderate / High Support Documentation: Shoulder     Clinical Presentation [x] LOW: stable  [] MODERATE: Evolving  [] HIGH:  Unstable     Decision Making/ Complexity Score: Low       Goals:  Short Term Goals: 2 weeks   1.) Patient will demonstrate independence in compliance and technique of home exercise program provided as per teach-back method of assessment.  2.) Patient will demonstrate a 9-point improvement as per FOTO score to demonstrate improvements in perceived functional ability.  .bl    Long Term Goals: 6 weeks   1. Pt will improve FOTO score to </=10% limited to decrease perceived limitation with carrying, moving, and handling objects.  2. Pt will improve external rotation  shoulder MMTs to = 4+/5 without pain to promote equal use of B UEs in performing functional tasks.  5. Pt will lift 5 lb objects without pain to promote functional QOL.  Plan     Plan of care Certification: 10/3/2024 to 12/3/2024.    Outpatient Physical Therapy 1 times weekly for 6 weeks to include the following interventions: Manual Therapy, Moist Heat/ Ice, Neuromuscular Re-ed, Patient Education, Self Care, Therapeutic Activities, and Therapeutic Exercise.     Rachel Villagran, PT DPT  Board Certified in Orthopedic Physical Therapy          Physician's Signature: _________________________________________ Date: ________________

## 2024-10-03 NOTE — PROGRESS NOTES
History of present illness:   [age] gentleman with history of dyslipidemia, prediabetes and anxiety disorder.  The patient reports that overall his anxiety has fairly well controlled though he still has some anxiety episodes with travel.  Past he has taken an occasional alprazolam which was helpful for these acute episodes.  Feels like he would like to have that on hand for these acute episodes which are infrequent.  He takes his maintenance sertraline low-dose 25 mg daily as directed.  Otherwise he has been doing well.  He is requesting antibiotic for traveler's diarrhea.      Current medications:   Medication list noted and reviewed.      Review of systems:   Constitutional: No fever no chills no generalized body aches.    Cardiovascular: No chest pain palpitations syncope.    Respiratory:  No cough shortness of breath.    Psychiatric:  Denies any suicidal homicidal ideation.  No depressive thoughts.  No new changes in lifestyle or life situation.  Does admit to moderate amount of work related stress.    Physical examination:   General:  Pleasant alert appropriately groomed gentleman no acute distress.    Vital signs:  All noted and reviewed is normal.    Mental status: Alert oriented affect mood all appropriate.  Insight appears to be good.  No psychotic thinking.  No suicidal ideation.      Impression:   Chronic mild anxiety disorder fairly well controlled but probably not optimal.      Plan:   Discussed increasing his dose of sertraline to 50 mg daily and he will try that for the next month and observe response.    He is given 10 tablets of alprazolam 0.5 mg to use on a p.r.n. basis only.  Follow-up with PCP as planned.  He is given a prescription for Zithromax 500 mg tablets to take one daily for three days if needed for traveler's diarrhea

## 2024-10-10 ENCOUNTER — CLINICAL SUPPORT (OUTPATIENT)
Dept: REHABILITATION | Facility: HOSPITAL | Age: 59
End: 2024-10-10
Attending: STUDENT IN AN ORGANIZED HEALTH CARE EDUCATION/TRAINING PROGRAM
Payer: COMMERCIAL

## 2024-10-10 DIAGNOSIS — G89.29 CHRONIC RIGHT SHOULDER PAIN: Primary | ICD-10-CM

## 2024-10-10 DIAGNOSIS — M75.111 NONTRAUMATIC INCOMPLETE TEAR OF RIGHT ROTATOR CUFF: ICD-10-CM

## 2024-10-10 DIAGNOSIS — M25.511 CHRONIC RIGHT SHOULDER PAIN: Primary | ICD-10-CM

## 2024-10-10 PROCEDURE — 97112 NEUROMUSCULAR REEDUCATION: CPT

## 2024-10-10 PROCEDURE — 97140 MANUAL THERAPY 1/> REGIONS: CPT

## 2024-10-10 PROCEDURE — 97110 THERAPEUTIC EXERCISES: CPT

## 2024-10-10 NOTE — PROGRESS NOTES
"  Physical Therapy Daily Treatment Note     Name: Calvin [facility] Number: [id number]    Therapy Diagnosis:   Encounter Diagnoses   Name Primary?    Chronic right shoulder pain Yes    Nontraumatic incomplete tear of right rotator cuff      Physician: Any Thomas MD    Visit Date: 10/10/2024    Physician Orders: PT Eval and Treat   Medical Diagnosis from Referral: M25.511,G89.29 (ICD-10-CM) - Chronic right shoulder pain M75.111 (ICD-10-CM) - Nontraumatic incomplete tear of right rotator cuff  Evaluation Date: 10/3/2024  Authorization Period Expiration: 12/31/2024  Plan of Care Expiration: 12/3/2024  Progress Note Due: 11/3/2024  Date of Surgery: -  Visit # / Visits authorized: 1/ 1   FOTO: 1/ 3  Time In: 1:56 pm  Time Out: 2:55 pm  Total Billable Time: 59 minutes  Precautions: Standard, Diabetes, and History of TIA  Subjective   Pt reports: his shoulder has been feeling much better since his evaluation.  He was compliant with home exercise program.  Response to previous treatment: Initial Evaluation  Functional change: Ongoing    Pain: 1/10  Location: Right Shoulder   Objective     Calvin received therapeutic exercises to develop strength, endurance, ROM, flexibility, and posture for 10 minutes including:  UBE, 6" (3" forward/backward)  Posterior Capsule Self-Mobilization, 10x5" hold    Calvin received the following manual therapy techniques: Joint mobilizations, Manual traction, Myofacial release, Manual Lymphatic Drainage, and Soft tissue Mobilization were applied to the: right shoulder for 22 minutes, including:  Pectoralis Minor MET, 4x6" hold, sidelying  Posterior Capsule MET, 4x6" hold  Glenohumeral Centration Mobilizations with I.R. and E.R,     Calvin participated in neuromuscular re-education activities to improve: Balance, Coordination, Kinesthetic, Sense, Proprioception, and Posture for 27 minutes. The following activities were included:  Prone I.R. and E.R. at 90/90 2x12  E.R and I.R. Walk Outs, 90/90 " degrees, red band, 12x3 steps with yellow and red, respectively  Middle Trapezius, Level 1, prone, 2x12  Lower Trapezius, Level 1, prone, 2x12  Wall Slide with Foam Roll, 3x8    Calvin participated in dynamic functional therapeutic activities to improve functional performance for 0  minutes, includin  Home Exercises Provided and Patient Education Provided     Education provided:   - Home exercise program     Written Home Exercises Provided: Patient instructed to cont prior HEP.  Exercises were reviewed and Calvin was able to demonstrate them prior to the end of the session.  Calvin demonstrated good  understanding of the education provided.     See EMR under Patient Instructions for exercises provided prior visit.    Assessment   Calvin demonstrates full active shoulder flexion range of motion this visit without increased pain. Shoulder external rotation against resistance at 90/90 remains painful and decreased in strength output. Posterior capsule is restricted, albeit improved range of motion with MET for improved horizontal adduction.    Calvin Is progressing well towards his goals.   Pt prognosis is Excellent.     Pt will continue to benefit from skilled outpatient physical therapy to address the deficits listed in the problem list box on initial evaluation, provide pt/family education and to maximize pt's level of independence in the home and community environment.     Pt's spiritual, cultural and educational needs considered and pt agreeable to plan of care and goals.     Anticipated barriers to physical therapy: None    Goals: Short Term Goals: 2 weeks   1.) Patient will demonstrate independence in compliance and technique of home exercise program provided as per teach-back method of assessment.  2.) Patient will demonstrate a 9-point improvement as per FOTO score to demonstrate improvements in perceived functional ability.     Long Term Goals: 6 weeks   1. Pt will improve FOTO score to </=10% limited to decrease  perceived limitation with carrying, moving, and handling objects.  2. Pt will improve external rotation  shoulder MMTs to = 4+/5 without pain to promote equal use of B UEs in performing functional tasks.  5. Pt will lift 5 lb objects without pain to promote functional QOL.    Plan   Continue to progress as per plan of care.    Rachel Villagran, PT, DPT  Board Certified in Orthopedic Physical Therapy

## 2024-10-14 NOTE — PLAN OF CARE
"[facility]   Physical Therapy Initial Evaluation      Name: Calvin Smith  Clinic Number: [id number]    Therapy Diagnosis:   Encounter Diagnoses   Name Primary?    Chronic right shoulder pain     Nontraumatic incomplete tear of right rotator cuff     Decreased muscle strength Yes        Physician: Any Thomas MD    Physician Orders: PT Eval and Treat   Medical Diagnosis from Referral: M25.511,G89.29 (ICD-10-CM) - Chronic right shoulder pain M75.111 (ICD-10-CM) - Nontraumatic incomplete tear of right rotator cuff  Evaluation Date: 10/3/2024  Authorization Period Expiration: 12/31/2024  Plan of Care Expiration: 12/3/2024  Progress Note Due: 11/3/2024  Date of Surgery: -  Visit # / Visits authorized: 1/ 1   FOTO: 1/ 3  Precautions: Standard, Diabetes, and History of TIA   Time In: 3:00 pm  Time Out: 4:00 pm  Total Billable Time: 60 minutes    Subjective   Mr. Smith returns for right shoulder pain. Current Pain level is 10/10.  He states that his lower back feels much better since his epidural injection. He has difficulty at night with moving the right shoulder.  He hasn't tried taking any medications for this shoulder pain. He hasn't tried physical therapy for his right shoulder. He does report excellent relief of his right shoulder pain after his subacromial bursa injection on 6/21/24, but reports the relief was short lived. He is interested in repeating this. He denies any numbness or tingling.  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 He received a shot on 10/2, which helped the pain. Otherwise he can drive, type but he cannot lift with the left arm or reach. When the arm is still, this does not hurt.  Falls: None  Imaging: MRI studies: 1. Partial thickness tear at the articular aspect " of the footplate of the supraspinatus tendon.  2. Tendinosis of the infraspinatus, subscapularis, and biceps tendons.  3. Mild fatty atrophy of the subscapularis and teres minor muscles.  4. Posterior labral tear mid equator level.    Prior Therapy: Not for this issue  Social History:  Lives with their family  Occupation: [employer]   Prior Level of Function: Independent  Current Level of Function: Cannot place suitcase up on the overcarriage of the plane, difficulty with self-care and reaching/carrying.    Pain:  Current 0/10, worst 10/10, best 0/10   Location: Right Shoulder  Description: Sharp (anterior deltoid region)  Aggravating Factors: Reaching, Lifting, Carrying  Easing Factors: Rest    Patients goals: To understand what is causing the pain with exercises to improve.     Medical History:   Past Medical History:   Diagnosis Date    Anxiety     Colon polyp     Fatty liver     TIA (transient ischemic attack)        Surgical History:   Calvin Smith  has a past surgical history that includes Colonoscopy (11/2019); Appendectomy (2005); Epidural steroid injection into lumbar spine (2018); Injection of anesthetic agent around medial branch nerves innervating lumbar facet joint (2019); and injection, spine, lumbosacral, transforaminal approach (Bilateral, 7/2/2024).    Medications:   Calvin has a current medication list which includes the following prescription(s): alprazolam, aspirin, atorvastatin, azithromycin, diclofenac sodium, fluticasone propionate, ibuprofen, lidocaine, metformin, scopolamine, sertraline, soolantra, and walker.    Allergies:   Review of patient's allergies indicates:  No Known Allergies     Objective      Shoulder Right Left Pain/Dysfunction with Movement   AROM      Flexion (scapular plane)  175 degrees  175 degrees    Internal rotation (Combined Movements)  Thoracolumbar Junction  ~T6    ER at 0° abd  70 degrees  70 degrees      Shoulder Right Pain/Dysfunction with Movement   PROM     Flexion  "(scapular plane)  175 degrees    Internal rotation @ 90 degrees  50 degrees    ER at 0° abd 80 degrees    ER at 45° abd  90 degrees    ER at 90° abd  90 degrees        U/E MMT Right Left Pain/Dysfunction with Movement   Shoulder Flexion  4/5 5/5 *Concordant Pain   Shoulder Internal Rotation 5/5 5/5    Shoulder External Rotation 4-/5 5/5    Serratus Anterior 4/5 5/5      Joint Mobility: Decreased right posterior capsule mobility, and posterior glide of the glenohumeral joint.  Special Tests:  Shoulder Right   PROM    Painful Arc  +   Resisted ER (at 0 degrees):  +   Resisted ER (at 90 degrees): +   Jhonny:  +   Scapular Assistance Test:  -     Intake Outcome Measure for FOTO Shoulder Survey    Therapist reviewed FOTO scores for Calvin Smith on 10/3/2024.   FOTO report - see Media section or FOTO account episode details.    Intake Score: TBD%         Treatment     Total Treatment time (time-based codes) separate from Evaluation: 18 minutes     Calvin received the treatments listed below:      therapeutic exercises to develop strength, endurance, ROM, flexibility, posture, and core stabilization for 8 minutes including:  E.R and I.R Walk-Out at 90 degrees of flexion, yellow band (Home exercise program)    manual therapy techniques: Joint mobilizations were applied  for 10 minutes, including:  Posterior Capsule MET, 4x6" hold  Glenohumeral Centration Mobilizations with E.R. and I.R    Patient Education and Home Exercises     Education provided:   - -Findings of evaluation and examination, and affect of these on plan for treatment  -Prognosis and expectations  -Role of PT and team-centered care for patient  -Home exercise program and expectations of therapy    Written Home Exercises Provided: Yes. Exercises were reviewed and Calvin was able to demonstrate them prior to the end of the session.  Calvin demonstrated good  understanding of the education provided. See EMR under Patient Instructions for exercises provided " during therapy sessions.    Assessment     Calvin is a 59 y.o. male referred to outpatient Physical Therapy with a medical diagnosis of M25.511,G89.29 (ICD-10-CM) - Chronic right shoulder pain M75.111 (ICD-10-CM) - Nontraumatic incomplete tear of right rotator cuff. Patient presents with limitation in reaching, lifting, carrying and self-care and home activities due to symptoms consistent with shoulder impingement as per clustered findings, painful external rotation with decreased muscle performance and scapular postural imbalance. Calvin will benefit from a customized physical therapy plan of care  to address the aforementioned impairments in an effort to improve human function and quality of life.      Patient prognosis is Good.   Patient will benefit from skilled outpatient Physical Therapy to address the deficits stated above and in the chart below, provide patient /family education, and to maximize patientt's level of independence.     Plan of care discussed with patient: Yes  Patient's spiritual, cultural and educational needs considered and patient is agreeable to the plan of care and goals as stated below:     Anticipated Barriers for therapy: None    Medical Necessity is demonstrated by the following  History  Co-morbidities and personal factors that may impact the plan of care [] LOW: no personal factors / co-morbidities  [] MODERATE: 1-2 personal factors / co-morbidities  [x] HIGH: 3+ personal factors / co-morbidities    Moderate / High Support Documentation:   Co-morbidities affecting plan of care: See Medical History    Personal Factors:   age     Examination  Body Structures and Functions, activity limitations and participation restrictions that may impact the plan of care [x] LOW: addressing 1-2 elements  [] MODERATE: 3+ elements  [] HIGH: 4+ elements (please support below)    Moderate / High Support Documentation: Shoulder     Clinical Presentation [x] LOW: stable  [] MODERATE: Evolving  [] HIGH:  Unstable     Decision Making/ Complexity Score: Low       Goals:  Short Term Goals: 2 weeks   1.) Patient will demonstrate independence in compliance and technique of home exercise program provided as per teach-back method of assessment.  2.) Patient will demonstrate a 9-point improvement as per FOTO score to demonstrate improvements in perceived functional ability.  .bl    Long Term Goals: 6 weeks   1. Pt will improve FOTO score to </=10% limited to decrease perceived limitation with carrying, moving, and handling objects.  2. Pt will improve external rotation  shoulder MMTs to = 4+/5 without pain to promote equal use of B UEs in performing functional tasks.  5. Pt will lift 5 lb objects without pain to promote functional QOL.  Plan     Plan of care Certification: 10/3/2024 to 12/3/2024.    Outpatient Physical Therapy 1 times weekly for 6 weeks to include the following interventions: Manual Therapy, Moist Heat/ Ice, Neuromuscular Re-ed, Patient Education, Self Care, Therapeutic Activities, and Therapeutic Exercise.     Rachel Villagran, PT DPT  Board Certified in Orthopedic Physical Therapy          Physician's Signature: _________________________________________ Date: ________________

## 2024-10-21 ENCOUNTER — PATIENT MESSAGE (OUTPATIENT)
Dept: PAIN MEDICINE | Facility: CLINIC | Age: 59
End: 2024-10-21
Payer: COMMERCIAL

## 2024-10-21 DIAGNOSIS — M51.369 DDD (DEGENERATIVE DISC DISEASE), LUMBAR: ICD-10-CM

## 2024-10-21 DIAGNOSIS — M47.816 LUMBAR SPONDYLOSIS: ICD-10-CM

## 2024-10-21 RX ORDER — METHOCARBAMOL 500 MG/1
500 TABLET, FILM COATED ORAL 4 TIMES DAILY
Qty: 40 TABLET | Refills: 0 | Status: SHIPPED | OUTPATIENT
Start: 2024-10-21 | End: 2024-10-31

## 2024-10-21 RX ORDER — TIZANIDINE 4 MG/1
4 TABLET ORAL 3 TIMES DAILY PRN
Qty: 90 TABLET | Refills: 2 | Status: SHIPPED | OUTPATIENT
Start: 2024-10-21 | End: 2025-01-19

## 2024-10-21 NOTE — TELEPHONE ENCOUNTER
Patient requesting refill on TIZANIDINE 4MG - Qty. 90  Last office visit 10.2.24        Patient requesting refill on METHOCARBAMOL 500MG  Last office visit 10.2.24

## 2024-11-22 ENCOUNTER — CLINICAL SUPPORT (OUTPATIENT)
Dept: ENDOSCOPY | Facility: HOSPITAL | Age: 59
End: 2024-11-22
Attending: FAMILY MEDICINE
Payer: COMMERCIAL

## 2024-11-22 ENCOUNTER — TELEPHONE (OUTPATIENT)
Dept: ENDOSCOPY | Facility: HOSPITAL | Age: 59
End: 2024-11-22

## 2024-11-22 DIAGNOSIS — Z86.0100 HISTORY OF COLONIC POLYPS: ICD-10-CM

## 2024-11-22 NOTE — PLAN OF CARE
Contacted patient to schedule colonoscopy. Patient states he is already scheduled in [place] with Dr Dae Avelar. Understanding verbalized.

## 2024-11-22 NOTE — TELEPHONE ENCOUNTER
Contacted patient to schedule colonoscopy. Patient states he is already scheduled in [place] with Dr Dae Avelar. Understanding verbalized.

## 2025-01-16 LAB — CRC RECOMMENDATION EXT: NORMAL

## 2025-01-29 ENCOUNTER — PATIENT MESSAGE (OUTPATIENT)
Dept: INTERNAL MEDICINE | Facility: CLINIC | Age: 60
End: 2025-01-29
Payer: COMMERCIAL

## 2025-01-29 ENCOUNTER — PATIENT OUTREACH (OUTPATIENT)
Dept: ADMINISTRATIVE | Facility: HOSPITAL | Age: 60
End: 2025-01-29
Payer: COMMERCIAL

## 2025-01-29 DIAGNOSIS — F41.9 ANXIETY: ICD-10-CM

## 2025-01-29 RX ORDER — FLUTICASONE PROPIONATE 50 MCG
SPRAY, SUSPENSION (ML) NASAL
Qty: 48 G | Refills: 0 | Status: SHIPPED | OUTPATIENT
Start: 2025-01-29

## 2025-01-29 RX ORDER — SCOLOPAMINE TRANSDERMAL SYSTEM 1 MG/1
1 PATCH, EXTENDED RELEASE TRANSDERMAL
Qty: 10 PATCH | Refills: 0 | Status: SHIPPED | OUTPATIENT
Start: 2025-01-29

## 2025-01-29 RX ORDER — SERTRALINE HYDROCHLORIDE 50 MG/1
50 TABLET, FILM COATED ORAL DAILY
Qty: 90 TABLET | Refills: 3 | Status: SHIPPED | OUTPATIENT
Start: 2025-01-29 | End: 2025-01-30 | Stop reason: SDUPTHER

## 2025-01-29 NOTE — TELEPHONE ENCOUNTER
Refill Decision Note   Calvinaman DominguezLuis  is requesting a refill authorization.  Brief Assessment and Rationale for Refill:  Approve     Medication Therapy Plan:         Comments:     Note composed:12:21 PM 01/29/2025

## 2025-01-30 RX ORDER — SERTRALINE HYDROCHLORIDE 50 MG/1
50 TABLET, FILM COATED ORAL DAILY
Qty: 90 TABLET | Refills: 3 | Status: SHIPPED | OUTPATIENT
Start: 2025-01-30

## 2025-02-06 ENCOUNTER — TELEPHONE (OUTPATIENT)
Dept: INTERNAL MEDICINE | Facility: CLINIC | Age: 60
End: 2025-02-06
Payer: COMMERCIAL

## 2025-02-06 DIAGNOSIS — Z00.00 WELL ADULT EXAM: Primary | ICD-10-CM

## 2025-02-06 NOTE — TELEPHONE ENCOUNTER
----- Message from Ruiz Sheth sent at 2/6/2025  9:04 AM CST -----    ----- Message -----  From: Chaya Romero  Sent: 2/6/2025   8:48 AM CST  To: [facility] Staff    Pt need lab order before appt

## 2025-02-20 DIAGNOSIS — E78.5 DYSLIPIDEMIA: ICD-10-CM

## 2025-02-20 DIAGNOSIS — R73.03 PREDIABETES: ICD-10-CM

## 2025-02-20 RX ORDER — METFORMIN HYDROCHLORIDE 500 MG/1
500 TABLET, EXTENDED RELEASE ORAL
Qty: 90 TABLET | Refills: 3 | Status: SHIPPED | OUTPATIENT
Start: 2025-02-20

## 2025-02-20 RX ORDER — ATORVASTATIN CALCIUM 10 MG/1
10 TABLET, FILM COATED ORAL
Qty: 90 TABLET | Refills: 3 | Status: SHIPPED | OUTPATIENT
Start: 2025-02-20

## 2025-02-20 NOTE — TELEPHONE ENCOUNTER
Refill Routing Note   Medication(s) are not appropriate for processing by [facility] for the following reason(s):        Required labs outdated    [facility] action(s):  Defer      Medication Therapy Plan: FLOS      Appointments  past 12m or future 3m with PCP    Date Provider   Last Visit   2/16/2024 Dc Fitzgerald MD   Next Visit   2/27/2025 Dc Fitzgerald MD   ED visits in past 90 days: 0        Note composed:7:13 AM 02/20/2025

## 2025-02-20 NOTE — TELEPHONE ENCOUNTER
Care Due:                  Date            Visit Type   Department     Provider  --------------------------------------------------------------------------------                                MYCHART                              ANNUAL                              CHECKUP/PHY  [facility] INTERNAL  Last Visit: 02-      S            ANICETO Fitzgerald                              EP -                              PRIMARY      [facility] INTERNAL  Next Visit: 02-      CARE ([facility])   [facility]ET[facility] MAR  [facility]                                                            Last  Test          Frequency    Reason                     Performed    Due Date  --------------------------------------------------------------------------------    CMP.........  12 months..  atorvastatin, metFORMIN..  02- 02-    HBA1C.......  6 months...  metFORMIN................  02- 08-    Lipid Panel.  12 months..  atorvastatin.............  02- 02-    [facility] Embedded Care Due Messages. Reference number: [id number].   2/20/2025 1:06:37 AM CST

## 2025-02-24 ENCOUNTER — LAB VISIT (OUTPATIENT)
Dept: LAB | Facility: HOSPITAL | Age: 60
End: 2025-02-24
Attending: FAMILY MEDICINE
Payer: COMMERCIAL

## 2025-02-24 DIAGNOSIS — Z00.00 WELL ADULT EXAM: ICD-10-CM

## 2025-02-24 LAB
ALBUMIN SERPL BCP-MCNC: 4.3 G/DL (ref 3.5–5.2)
ALP SERPL-CCNC: 68 U/L (ref 40–150)
ALT SERPL W/O P-5'-P-CCNC: 62 U/L (ref 10–44)
ANION GAP SERPL CALC-SCNC: 11 MMOL/L (ref 8–16)
AST SERPL-CCNC: 46 U/L (ref 10–40)
BASOPHILS # BLD AUTO: 0.06 K/UL (ref 0–0.2)
BASOPHILS NFR BLD: 0.9 % (ref 0–1.9)
BILIRUB SERPL-MCNC: 0.7 MG/DL (ref 0.1–1)
BUN SERPL-MCNC: 18 MG/DL (ref 6–20)
CALCIUM SERPL-MCNC: 9.4 MG/DL (ref 8.7–10.5)
CHLORIDE SERPL-SCNC: 108 MMOL/L (ref 95–110)
CHOLEST SERPL-MCNC: 114 MG/DL (ref 120–199)
CHOLEST/HDLC SERPL: 3.1 {RATIO} (ref 2–5)
CO2 SERPL-SCNC: 23 MMOL/L (ref 23–29)
COMPLEXED PSA SERPL-MCNC: 1.8 NG/ML (ref 0–4)
CREAT SERPL-MCNC: 1 MG/DL (ref 0.5–1.4)
DIFFERENTIAL METHOD BLD: NORMAL
EOSINOPHIL # BLD AUTO: 0.3 K/UL (ref 0–0.5)
EOSINOPHIL NFR BLD: 3.8 % (ref 0–8)
ERYTHROCYTE [DISTWIDTH] IN BLOOD BY AUTOMATED COUNT: 13 % (ref 11.5–14.5)
EST. GFR  (NO RACE VARIABLE): >60 ML/MIN/1.73 M^2
ESTIMATED AVG GLUCOSE: 120 MG/DL (ref 68–131)
GLUCOSE SERPL-MCNC: 107 MG/DL (ref 70–110)
HBA1C MFR BLD: 5.8 % (ref 4–5.6)
HCT VFR BLD AUTO: 49.1 % (ref 40–54)
HDLC SERPL-MCNC: 37 MG/DL (ref 40–75)
HDLC SERPL: 32.5 % (ref 20–50)
HGB BLD-MCNC: 15.7 G/DL (ref 14–18)
IMM GRANULOCYTES # BLD AUTO: 0.02 K/UL (ref 0–0.04)
IMM GRANULOCYTES NFR BLD AUTO: 0.3 % (ref 0–0.5)
LDLC SERPL CALC-MCNC: 50.4 MG/DL (ref 63–159)
LYMPHOCYTES # BLD AUTO: 2 K/UL (ref 1–4.8)
LYMPHOCYTES NFR BLD: 28.3 % (ref 18–48)
MCH RBC QN AUTO: 28.9 PG (ref 27–31)
MCHC RBC AUTO-ENTMCNC: 32 G/DL (ref 32–36)
MCV RBC AUTO: 90 FL (ref 82–98)
MONOCYTES # BLD AUTO: 0.6 K/UL (ref 0.3–1)
MONOCYTES NFR BLD: 7.8 % (ref 4–15)
NEUTROPHILS # BLD AUTO: 4.1 K/UL (ref 1.8–7.7)
NEUTROPHILS NFR BLD: 58.9 % (ref 38–73)
NONHDLC SERPL-MCNC: 77 MG/DL
NRBC BLD-RTO: 0 /100 WBC
PLATELET # BLD AUTO: 348 K/UL (ref 150–450)
PMV BLD AUTO: 10.6 FL (ref 9.2–12.9)
POTASSIUM SERPL-SCNC: 4.2 MMOL/L (ref 3.5–5.1)
PROT SERPL-MCNC: 7.5 G/DL (ref 6–8.4)
RBC # BLD AUTO: 5.43 M/UL (ref 4.6–6.2)
SODIUM SERPL-SCNC: 142 MMOL/L (ref 136–145)
T4 FREE SERPL-MCNC: 0.84 NG/DL (ref 0.71–1.51)
TRIGL SERPL-MCNC: 133 MG/DL (ref 30–150)
TSH SERPL DL<=0.005 MIU/L-ACNC: 2.61 UIU/ML (ref 0.4–4)
WBC # BLD AUTO: 7.03 K/UL (ref 3.9–12.7)

## 2025-02-24 PROCEDURE — 80053 COMPREHEN METABOLIC PANEL: CPT | Performed by: FAMILY MEDICINE

## 2025-02-24 PROCEDURE — 84443 ASSAY THYROID STIM HORMONE: CPT | Performed by: FAMILY MEDICINE

## 2025-02-24 PROCEDURE — 84439 ASSAY OF FREE THYROXINE: CPT | Performed by: FAMILY MEDICINE

## 2025-02-24 PROCEDURE — 80061 LIPID PANEL: CPT | Performed by: FAMILY MEDICINE

## 2025-02-24 PROCEDURE — 85025 COMPLETE CBC W/AUTO DIFF WBC: CPT | Performed by: FAMILY MEDICINE

## 2025-02-24 PROCEDURE — 36415 COLL VENOUS BLD VENIPUNCTURE: CPT | Mod: PO | Performed by: FAMILY MEDICINE

## 2025-02-24 PROCEDURE — 83036 HEMOGLOBIN GLYCOSYLATED A1C: CPT | Performed by: FAMILY MEDICINE

## 2025-02-24 PROCEDURE — 84153 ASSAY OF PSA TOTAL: CPT | Performed by: FAMILY MEDICINE

## 2025-02-25 ENCOUNTER — RESULTS FOLLOW-UP (OUTPATIENT)
Dept: INTERNAL MEDICINE | Facility: CLINIC | Age: 60
End: 2025-02-25

## 2025-02-27 ENCOUNTER — OFFICE VISIT (OUTPATIENT)
Dept: INTERNAL MEDICINE | Facility: CLINIC | Age: 60
End: 2025-02-27
Payer: COMMERCIAL

## 2025-02-27 VITALS
DIASTOLIC BLOOD PRESSURE: 64 MMHG | OXYGEN SATURATION: 90 % | TEMPERATURE: 99 F | BODY MASS INDEX: 25.81 KG/M2 | WEIGHT: 180.31 LBS | HEIGHT: 70 IN | SYSTOLIC BLOOD PRESSURE: 98 MMHG | RESPIRATION RATE: 18 BRPM | HEART RATE: 95 BPM

## 2025-02-27 DIAGNOSIS — Z23 NEED FOR PNEUMOCOCCAL 20-VALENT CONJUGATE VACCINATION: ICD-10-CM

## 2025-02-27 DIAGNOSIS — Z86.73 HISTORY OF TRANSIENT ISCHEMIC ATTACK (TIA): ICD-10-CM

## 2025-02-27 DIAGNOSIS — R73.03 PREDIABETES: ICD-10-CM

## 2025-02-27 DIAGNOSIS — F41.9 ANXIETY: ICD-10-CM

## 2025-02-27 DIAGNOSIS — Z00.00 WELL ADULT EXAM: Primary | ICD-10-CM

## 2025-02-27 DIAGNOSIS — E78.5 DYSLIPIDEMIA: ICD-10-CM

## 2025-02-27 DIAGNOSIS — G47.33 OSA (OBSTRUCTIVE SLEEP APNEA): ICD-10-CM

## 2025-02-27 PROCEDURE — 99999 PR PBB SHADOW E&M-EST. PATIENT-LVL V: CPT | Mod: PBBFAC,,, | Performed by: FAMILY MEDICINE

## 2025-02-27 RX ORDER — FLUTICASONE PROPIONATE 50 MCG
SPRAY, SUSPENSION (ML) NASAL
Qty: 48 G | Refills: 3 | Status: SHIPPED | OUTPATIENT
Start: 2025-02-27

## 2025-02-27 NOTE — PROGRESS NOTES
Subjective     Patient ID: Calvin Smith is a 59 y.o. male.    Chief Complaint: Annual Exam    HPI 58-year-old white male presents to clinic today for annual physical exam.  He has a previous TIA many years ago and remains stable on aspirin 81 mg daily.  Hyperlipidemia is currently well controlled on Lipitor 10 mg daily.  Prediabetes is stable on metformin 500 mg daily with current hemoglobin A1c of 5.8.  Anxiety is stable on Zoloft 50 mg daily.  Sleep apnea remains well controlled with use of CPAP nightly.  He reports a past surgical history of appendectomy, epidural spinal injection, and previous colonoscopy.  He reports a family history of his father having leukemia and mother hypothyroidism.  He is up-to-date with all screening exams.  Prevnar 20 has been given today.  Review of Systems   Constitutional:  Negative for appetite change, chills, fatigue and fever.   HENT:  Negative for nasal congestion, ear pain, hearing loss, postnasal drip, rhinorrhea, sinus pressure/congestion, sore throat and tinnitus.    Eyes:  Negative for redness, itching and visual disturbance.   Respiratory:  Negative for cough, chest tightness and shortness of breath.    Cardiovascular:  Negative for chest pain and palpitations.   Gastrointestinal:  Negative for abdominal pain, constipation, diarrhea, nausea and vomiting.   Genitourinary:  Negative for decreased urine volume, difficulty urinating, dysuria, frequency, hematuria and urgency.   Musculoskeletal:  Negative for back pain, myalgias, neck pain and neck stiffness.   Integumentary:  Negative for rash.   Neurological:  Negative for dizziness, light-headedness and headaches.   Psychiatric/Behavioral: Negative.            Objective     Physical Exam  Vitals and nursing note reviewed.   Constitutional:       General: He is not in acute distress.     Appearance: He is well-developed. He is not diaphoretic.   HENT:      Head: Normocephalic and atraumatic.      Right Ear: External ear  normal.      Left Ear: External ear normal.      Nose: Nose normal.      Mouth/Throat:      Pharynx: No oropharyngeal exudate.   Eyes:      General: No scleral icterus.        Right eye: No discharge.         Left eye: No discharge.      Conjunctiva/sclera: Conjunctivae normal.      Pupils: Pupils are equal, round, and reactive to light.   Neck:      Thyroid: No thyromegaly.      Vascular: No JVD.      Trachea: No tracheal deviation.   Cardiovascular:      Rate and Rhythm: Normal rate and regular rhythm.      Heart sounds: Normal heart sounds. No murmur heard.     No friction rub. No gallop.   Pulmonary:      Effort: Pulmonary effort is normal. No respiratory distress.      Breath sounds: Normal breath sounds. No stridor. No wheezing or rales.   Abdominal:      General: Bowel sounds are normal. There is no distension.      Palpations: Abdomen is soft. There is no mass.      Tenderness: There is no abdominal tenderness. There is no guarding or rebound.   Musculoskeletal:         General: No tenderness. Normal range of motion.      Cervical back: Normal range of motion and neck supple.   Lymphadenopathy:      Cervical: No cervical adenopathy.   Skin:     General: Skin is warm and dry.      Coloration: Skin is not pale.      Findings: No erythema or rash.   Neurological:      Mental Status: He is alert and oriented to person, place, and time.   Psychiatric:         Behavior: Behavior normal.         Thought Content: Thought content normal.         Judgment: Judgment normal.            Assessment and Plan     1. Well adult exam    2. Prediabetes    3. Dyslipidemia    4. Anxiety    5. History of transient ischemic attack (TIA)  Overview:  Dec 2019      6. MARY JO (obstructive sleep apnea)    7. Need for pneumococcal 20-valent conjugate vaccination  -     pneumoc 20-krista conj-dip cr(PF) (PREVNAR-20 (PF)) injection Syrg 0.5 mL    Other orders  -     fluticasone propionate (FLONASE) 50 mcg/actuation nasal spray; SHAKE LIQUID AND  USE 1 SPRAY IN EACH NOSTRIL TWICE DAILY  Dispense: 48 g; Refill: 3        1. Labs have been reviewed and are overall within normal limits.    2. Continue metformin 500 mg daily.  Prediabetes is well controlled.  Current hemoglobin A1c is 5.8.  3. Continue Lipitor 10 mg daily.  Dyslipidemia stable.  4. Continue aspirin 81 mg daily and Lipitor 10 mg daily.  Patient has a previous TIA and remains stable.    5. Continue Zoloft 50 mg daily.  Anxiety is stable.    6. Continue CPAP nightly.  Sleep apnea is well controlled.  7. Prevnar 20 given.    8. Return to clinic as needed or in 1 year for annual physical exam.             Follow up in about 1 year (around 2/27/2026), or if symptoms worsen or fail to improve, for Annual exam.

## 2025-03-05 ENCOUNTER — PATIENT MESSAGE (OUTPATIENT)
Dept: INTERNAL MEDICINE | Facility: CLINIC | Age: 60
End: 2025-03-05
Payer: COMMERCIAL

## 2025-04-14 ENCOUNTER — PATIENT MESSAGE (OUTPATIENT)
Dept: PAIN MEDICINE | Facility: CLINIC | Age: 60
End: 2025-04-14
Payer: COMMERCIAL

## 2025-04-14 DIAGNOSIS — M47.816 LUMBAR SPONDYLOSIS: ICD-10-CM

## 2025-04-14 DIAGNOSIS — M43.16 SPONDYLOLISTHESIS OF LUMBAR REGION: ICD-10-CM

## 2025-04-14 RX ORDER — IBUPROFEN 800 MG/1
800 TABLET ORAL EVERY 8 HOURS PRN
Qty: 90 TABLET | Refills: 2 | Status: SHIPPED | OUTPATIENT
Start: 2025-04-14

## 2025-04-15 ENCOUNTER — OFFICE VISIT (OUTPATIENT)
Dept: PAIN MEDICINE | Facility: CLINIC | Age: 60
End: 2025-04-15
Payer: COMMERCIAL

## 2025-04-15 ENCOUNTER — PATIENT MESSAGE (OUTPATIENT)
Dept: PAIN MEDICINE | Facility: OTHER | Age: 60
End: 2025-04-15
Payer: COMMERCIAL

## 2025-04-15 DIAGNOSIS — M54.16 LUMBAR RADICULOPATHY: ICD-10-CM

## 2025-04-15 DIAGNOSIS — M54.16 LUMBAR RADICULOPATHY: Primary | ICD-10-CM

## 2025-04-15 DIAGNOSIS — M51.360 DEGENERATION OF INTERVERTEBRAL DISC OF LUMBAR REGION WITH DISCOGENIC BACK PAIN: Primary | ICD-10-CM

## 2025-04-15 PROCEDURE — 1160F RVW MEDS BY RX/DR IN RCRD: CPT | Mod: CPTII,95,, | Performed by: STUDENT IN AN ORGANIZED HEALTH CARE EDUCATION/TRAINING PROGRAM

## 2025-04-15 PROCEDURE — 3044F HG A1C LEVEL LT 7.0%: CPT | Mod: CPTII,95,, | Performed by: STUDENT IN AN ORGANIZED HEALTH CARE EDUCATION/TRAINING PROGRAM

## 2025-04-15 PROCEDURE — 98006 SYNCH AUDIO-VIDEO EST MOD 30: CPT | Mod: 95,,, | Performed by: STUDENT IN AN ORGANIZED HEALTH CARE EDUCATION/TRAINING PROGRAM

## 2025-04-15 PROCEDURE — 1159F MED LIST DOCD IN RCRD: CPT | Mod: CPTII,95,, | Performed by: STUDENT IN AN ORGANIZED HEALTH CARE EDUCATION/TRAINING PROGRAM

## 2025-04-15 NOTE — H&P (VIEW-ONLY)
Chronic Pain-Tele-Medicine-Established Note (Follow up visit)      The patient location is: lower back pain  The chief complaint leading to consultation is: lower back pain  Visit type: Virtual visit with synchronous audio and video  Total time spent with patient: 20 minutes  Each patient to whom he or she provides medical services by telemedicine is:  (1) informed of the relationship between the physician and patient and the respective role of any other health care provider with respect to management of the patient; and (2) notified that he or she may decline to receive medical services by telemedicine and may withdraw from such care at any time.    Notes:     SUBJECTIVE:    Pain Disability Index Review:      6/21/2024     1:07 PM 10/31/2023     9:23 AM   Last 3 PDI Scores   Pain Disability Index (PDI) 56 0     INTERVAL HISTORY 4/15/2025:  Mr. Carrasquillo returns for lower back pain. Current Pain level is 10/10.  He feels pain in his hips and his buttock.   He feels the pain is worse than it was in the past.  He reports he finds it difficult to put his clothes on, he can't turn side-to-side in his bed. He has an appointment to see a spine surgeon tomorrow.    INTERVAL HISTORY 9/30/2024:  Mr. Smith returns for right shoulder pain. Current Pain level is 10/10.  He states that his lower back feels much better since his epidural injection. He has difficulty at night with moving the right shoulder.  He hasn't tried taking any medications for this shoulder pain. He hasn't tried physical therapy for his right shoulder. He does report excellent relief of his right shoulder pain after his subacromial bursa injection on 6/21/24, but reports the relief was short lived. He is interested in repeating this.    INTERVAL HISTORY 6/21/2024:  Mr. Smith returns for right shoulder, right thumb, and lower back pain. Current Pain level is 9/10.  He reports that last night the shoulder was unbearable and wanted to return to clinic for  a steroid injection as soon as possible.  He continues to have lower back pain, and is tentatively scheduled for a bilateral L5/S1 transforaminal epidural steroid injection for vertebrogenic pain.    INTERVAL HISTORY 6/20/2024:  Ms. Lau presents for chronic back pain. Current Pain level is 9/10.  He reports that he continues to do his home exercises and feels that it does help a little. However, he is noticing diminishing returns. He continues to feel the pain is across his lower back like a band, and is worse with lifting/flexing forward, and at night while lying in bed.  He completed the healthy back program and he has continued maintaining the exercises as much as tolerated.  He also reports right shoulder pain and right hand pain which is new since his last visit.  He does report that he had similar left hand pain which resolved after a steroid injection.    INTERVAL HISTORY 9/19/23:  Mr. Smith presents today with history of lower back pain. His current pain level is 0/10.  He feels his pain is better controlled at this point.  He has been playing golf more frequently now. He reports he had an event recently and had a small exacerbation which went away with his tizanidine and ibuprofen.  He feels satisfied with his improvement. He is looking forward to a golf tournament in December.  He still has his first appointment with healthy back program in mid-November.    INTERVAL HISTORY 9/19/23:  He states that he has been trying to get with physical therapy, however he has been having issues with scheduling.  He has started some exercises at home, and he feels his pain has been improving.  He reports that he has been able to take less of the tizanidine and ibuprofen. He is anxious about his hip, as he was told by a surgeon that he may have a hip fracture.  He does admit to some soreness around the left hip.  He also admits that he does sometimes wake up when he turns to the left or right side.    INITIAL HPI  9/12/23:  Calvin Smith presents to the clinic for the evaluation of lower back pain. He states he has had back pain for 20 years, however he has had exacerbations off/on.  He states that the pain is just in the lower back (no radiation down his legs). He presented to the emergency room on 9/7/23 due to the most severe exacerbation while toweling himself off after a bath.The pain is located in the lower back area and radiates to the sides of the back (not down the legs).  The pain is described as aching and is rated as 6/10. The pain is rated with a score of  0/10 on the BEST day and a score of 10/10 on the WORST day.  Symptoms interfere with daily activity. The pain is exacerbated by Extension and Flexing.  The pain is mitigated by medications.  The patient reports 4 hours of uninterrupted sleep per night (not related to pain).     Patient denies urinary incontinence, bowel incontinence, and significant motor weakness.      Physical Therapy/Home Exercise: yes, currently consistent with home exercise program     Pain Medications:   - ibuprofen 600mg   - robaxin 500mg      report:  Reviewed and consistent with medication use as prescribed.     Pain Procedures:   Had Prior Epidural with no relief.     Imaging:   MRI SHOULDER WITHOUT CONTRAST RIGHT     CLINICAL HISTORY:  Shoulder pain, labral tear suspected, xray done;  Pain in right shoulder     TECHNIQUE:  Multiplanar multisequence MRI examination of right shoulder.     COMPARISON:  Right shoulder radiograph 06/20/2024.     FINDINGS:  ROTATOR CUFF:     Supraspinatus: Partial tear at the footplate of the supraspinatus 2 x 7 mm with associated tendinosis.     Infraspinatus: Intact.  Distal aspect intermediate signal, consistent with tendinosis.     Subscapularis: Distal aspect intermediate signal, consistent with tendinosis.     Teres Minor: Intact.  No tendinosis.     There is small volume of fluid within the subdeltoid bursa.     LABRUM: Superior labrum appears  "grossly intact on this standard non arthrogram exam.Posterior aspect of superior labrum ( "peel-back" location) appears intact. Posterior labrum tear at the level of the mid equator.  Anterior inferior labrum appears intact. IGHL:Intact.     LONG HEAD BICEPS TENDON: Located within bicipital groove and intact yet attenuated.Biceps-labral anchor is intact. Intermediate signal, consistent with tendinosis.  No tenosynovitis. Rotator Interval is normal. Biceps pulley is intact.     BONES: No evident fracture.Mild bone marrow edema of the acromioclavicular joint.  AC joint demonstrates normal alignment with moderate hypertrophy.No significant osteo-acromial outlet narrowing..  There is no evident os acromiale.     CARTILAGE: Humeral head and glenoid cartilage preserved without focal defects. No subchondral marrow edema.  No synovial abnormality or intra-articular loose bodies. Glenoid fossa demonstrates no sclerosis.     MUSCLES:  Mild fatty atrophy of the subscapularis muscle and teres minor muscle.  The remainder of the muscle bulk is within normal limits.     Impression:     1. Partial thickness tear at the articular aspect of the footplate of the supraspinatus tendon.  2. Tendinosis of the infraspinatus, subscapularis, and biceps tendons.  3. Mild fatty atrophy of the subscapularis and teres minor muscles.  4. Posterior labral tear mid equator level.     Electronically signed by resident: Paulette Archer  Date:                                            09/27/2024  Time:                                           08:29     Electronically signed by:Dc Quiñones MD  Date:                                            09/27/2024  Time:                                           09:39    XR SHOULDER TRAUMA 3 VIEW RIGHT     CLINICAL HISTORY:  Unspecified injury of right shoulder and upper arm, subsequent encounter     TECHNIQUE:  Three or four views of the right shoulder were performed.     COMPARISON:  None     FINDINGS:  Bones " are well mineralized.  The glenohumeral joint and AC joint appear intact.  No fracture or dislocation is seen.  No significant degenerative changes.  No soft tissue abnormality.     Impression:     No acute abnormality        Electronically signed by:Eduardo Armstrong MD  Date:                                            06/20/2024  Time:                                           12:02    XR HAND COMPLETE 3 VIEW RIGHT     CLINICAL HISTORY:  Pain in right hand     TECHNIQUE:  PA, lateral, and oblique views of the right hand were performed.     COMPARISON:  None     FINDINGS:  Bones are well mineralized.  Alignment is satisfactory and joint spaces appear adequately maintained.  No fracture, dislocation, or erosive change.  No significant degenerative changes.  No soft tissue abnormality.     Impression:     No acute abnormality        Electronically signed by:Eduardo Armstrong MD  Date:                                            06/20/2024  Time:                                           12:04    XR HIPS BILATERAL 2 VIEW INCL AP PELVIS     CLINICAL HISTORY:  Trochanteric bursitis, unspecified hip     TECHNIQUE:  AP view of the pelvis and frogleg lateral views of both hips were performed.     COMPARISON:  None     FINDINGS:  Femoral heads are well located with respect to the acetabula.  Femoral heads maintain a normal round contour.  No acute fracture seen.  Mild osteophyte formation on the left without significant joint space narrowing.     Impression:     No acute osseous abnormality seen.        Electronically signed by: Jacque Oropeza  Date:                                            09/19/2023  Time:                                           10:21    MRI LUMBAR SPINE WITHOUT CONTRAST     CLINICAL HISTORY:  Spinal stenosis, lumbar;     TECHNIQUE:  Multiplanar, multisequence MR images were acquired from the thoracolumbar junction to the sacrum without contrast.     COMPARISON:  CT 09/06/2023, x-ray 09/06/2023      FINDINGS:  Alignment: Mild levo scoliosis.  Minimal grade 1 retrolisthesis L4-5.  Bilateral spondylolysis L5 with grade 1 anterolisthesis L5-S1.     Vertebrae: Benign osseous hemangioma at L4.  Chronic degenerative endplate change at L4-L5 and L5-S1.     Discs: Degenerative disc desiccation from L3-L4 through L5-S1 with mild L4-L5 and severe L5-S1 height loss.     Cord: Conus terminates at L1 and appears unremarkable.  Cauda equina appears unremarkable.     Degenerative findings:     *T12-L1: No spinal canal stenosis or neural foraminal narrowing.  *L1-L2: No spinal canal stenosis or neural foraminal narrowing.  *L2-L3: No spinal canal stenosis or neural foraminal narrowing.  *L3-L4: Mild broad-based posterior disc bulge with right subarticular protrusion that causes mass effect on the right lateral recess and may impinge upon the right descending L4 nerve root.  Bilateral facet arthropathy and bilateral ligamentum flavum hypertrophy.  Mild spinal canal and moderate right lateral recess stenosis.  *L4-L5: Circumferential disc bulge.  Bilateral facet arthropathy and bilateral ligamentum flavum hypertrophy.  Mild-to-moderate bilateral lateral recess stenosis with possible impingement of the right descending L5 nerve root.  Moderate right and mild left neural foraminal narrowing.  *L5-S1: Grade 1 anterolisthesis with uncovering of the intervertebral disc.  Bilateral facet arthropathy.  No spinal canal stenosis.  Moderate to severe bilateral neural foraminal narrowing with suspected impingement of the exiting L5 nerve roots.  Paraspinal muscles & soft tissues: Right simple renal cyst.     Impression:     1. Bilateral L5 spondylolysis with grade 1 anterolisthesis of L5 on S1 contributing to moderate to severe bilateral neural foraminal narrowing with suspected impingement of the exiting L5 nerve roots.  2. Additional lumbar spondylosis at L3-L4 and L4-L5 as detailed above.     Electronically signed by resident: Phil  Shazia  Date:                                            09/07/2023  Time:                                           14:11     Electronically signed by: Myles Orozco MD  Date:                                            09/07/2023  Time:                                           15:43         CT LUMBAR SPINE WITHOUT CONTRAST     CLINICAL HISTORY:  Low back pain, no red flags, no prior management;     TECHNIQUE:  Low-dose axial, sagittal and coronal reformations are obtained through the lumbar spine.  Contrast was not administered.     COMPARISON:  X-ray 09/06/2023     FINDINGS:  No acute fractures of the lumbar spine.     Moderate disc space narrowing at L5-S1.  Grade 1 spondylolisthesis of L5 on S1.  Minimal grade 1 retrolisthesis of L4 on L5.     Bilateral spondylolysis of L5 with associated facet arthropathy at L5-S1 and L4-5.     L1-2: No significant abnormality.     L2-3: No significant abnormality.     L3-4: Mild diffuse disc protrusion.  Mild ligamentum flavum hypertrophy.  Severe central canal narrowing.  Neural foramen are adequately maintained.     L4-5: Mild diffuse posterior disc osteophyte complex with mild disc protrusion.  Bilateral facet arthropathy.  Mild-moderate central canal narrowing.  Severe right foraminal narrowing.     L5-S1: Bilateral spondylolysis with grade 1 spondylolisthesis and slight uncovering of the disc posteriorly with mild protrusion.  Severe bilateral foraminal narrowing.  Central canal is adequately maintained.     The remaining visualized paravertebral structures demonstrate no pathology.     Impression:     No acute abnormality.     Multilevel chronic and degenerative changes.  See above comments.        Electronically signed by: Owen Sinclair  Date:                                            09/06/2023  Time:                                           20:05     XR LUMBAR SPINE AP AND LATERAL     CLINICAL HISTORY:  low back pain;     TECHNIQUE:  AP, lateral and spot  images were performed of the lumbar spine.     COMPARISON:  None     FINDINGS:  Five lumbar vertebral bodies.  Vertebral body heights are maintained.     Disc space narrowing and endplate changes L5-S1.     Grade 1 anterolisthesis L5-S1 suspected to be on account of pars defects.     Impression:     Please see above.        Electronically signed by: Jacque Oropeza  Date:                                            09/06/2023  Time:                                           16:35    Allergies: Review of patient's allergies indicates:  No Known Allergies    Current Medications:   Current Outpatient Medications   Medication Sig Dispense Refill    ALPRAZolam (XANAX) 0.5 MG tablet 1 po qd prn anxiety (Patient not taking: Reported on 2/27/2025) 10 tablet 1    aspirin (ECOTRIN) 81 MG EC tablet Take 1 tablet (81 mg total) by mouth once daily. 90 tablet 3    atorvastatin (LIPITOR) 10 MG tablet TAKE 1 TABLET ONCE DAILY 90 tablet 3    azithromycin (ZITHROMAX) 500 MG tablet 1 po qd x 3d for travel diarrhea (Patient not taking: Reported on 2/27/2025) 3 tablet 0    diclofenac sodium (VOLTAREN ARTHRITIS PAIN) 1 % Gel Apply 2 g topically 4 (four) times daily. (Patient not taking: Reported on 2/27/2025) 100 g 2    fluticasone propionate (FLONASE) 50 mcg/actuation nasal spray SHAKE LIQUID AND USE 1 SPRAY IN EACH NOSTRIL TWICE DAILY 48 g 3    ibuprofen (ADVIL,MOTRIN) 800 MG tablet Take 1 tablet (800 mg total) by mouth every 8 (eight) hours as needed for Pain. 90 tablet 2    LIDOcaine (LIDODERM) 5 % Place 2 patches onto the skin once daily. Remove & Discard patch within 12 hours or as directed by MD (Patient not taking: Reported on 2/27/2025) 15 patch 0    metFORMIN (GLUCOPHAGE-XR) 500 MG ER 24hr tablet TAKE 1 TABLET DAILY WITH   BREAKFAST 90 tablet 3    scopolamine (TRANSDERM-SCOP) 1.3-1.5 mg (1 mg over 3 days) Place 1 patch onto the skin every 72 hours. (Patient not taking: Reported on 2/27/2025) 10 patch 0    sertraline (ZOLOFT) 50  MG tablet Take 1 tablet (50 mg total) by mouth once daily. 90 tablet 3    SOOLANTRA 1 % Crea Apply topically. (Patient not taking: Reported on 2/27/2025)      walker Misc 1 each by Misc.(Non-Drug; Combo Route) route once. Rolling walker for 1 dose 1 each 0     No current facility-administered medications for this visit.       REVIEW OF SYSTEMS:    GENERAL:  No weight loss, malaise or fevers.  HEENT:  Negative for frequent or significant headaches.  NECK:  Negative for lumps, goiter, pain and significant neck swelling.  RESPIRATORY:  Negative for cough, wheezing or shortness of breath.  CARDIOVASCULAR:  Negative for chest pain, leg swelling or palpitations.  GI:  Negative for abdominal discomfort, blood in stools or black stools or change in bowel habits.  MUSCULOSKELETAL:  See HPI.  SKIN:  Negative for lesions, rash, and itching.  PSYCH:  Negative for sleep disturbance, mood disorder and recent psychosocial stressors.  HEMATOLOGY/LYMPHOLOGY:  Negative for prolonged bleeding, bruising easily or swollen nodes.  NEURO:   No history of headaches, syncope, paralysis, seizures or tremors.  All other reviewed and negative other than HPI.    Past Medical History:  Past Medical History:   Diagnosis Date    Anxiety     Colon polyp     Fatty liver     TIA (transient ischemic attack)        Past Surgical History:  Past Surgical History:   Procedure Laterality Date    APPENDECTOMY  2005    COLONOSCOPY  11/2019    EPIDURAL STEROID INJECTION INTO LUMBAR SPINE  2018    INJECTION OF ANESTHETIC AGENT AROUND MEDIAL BRANCH NERVES INNERVATING LUMBAR FACET JOINT  2019    INJECTION, SPINE, LUMBOSACRAL, TRANSFORAMINAL APPROACH Bilateral 7/2/2024    Procedure: Bilateral L5/S1 TFESI;  Surgeon: Any Thomas MD;  Location: [facility];  Service: Pain Management;  Laterality: Bilateral;  20 mins  ASA 5 days       Family History:  Family History   Problem Relation Name Age of Onset    No Known Problems Mother      Leukemia Father       No Known Problems Sister      No Known Problems Brother      No Known Problems Sister      No Known Problems Brother         Social History:  Social History     Socioeconomic History    Marital status:      Spouse name: Leigh Reese    Number of children: 2   Tobacco Use    Smoking status: Never    Smokeless tobacco: Never   Substance and Sexual Activity    Drug use: Never    Sexual activity: Yes   Social History Narrative    He is satisfied with weight.    Rates diet as fair to good.    He does not drink at least 1/2 gallon water daily.    He drinks 1-2 coffee/tea/caffeine-containing soft drinks daily.    Total sleep time at night is 6-7 hours (poor quality).    He works 40-50 hours per week.    He does wear seat belts.    Hobbies include tennis.     Social Drivers of Health     Financial Resource Strain: Low Risk  (9/7/2023)    Overall Financial Resource Strain (CARDIA)     Difficulty of Paying Living Expenses: Not hard at all   Food Insecurity: No Food Insecurity (9/7/2023)    Hunger Vital Sign     Worried About Running Out of Food in the Last Year: Never true     Ran Out of Food in the Last Year: Never true   Transportation Needs: No Transportation Needs (9/7/2023)    PRAPARE - Transportation     Lack of Transportation (Medical): No     Lack of Transportation (Non-Medical): No   Physical Activity: Sufficiently Active (9/7/2023)    Exercise Vital Sign     Days of Exercise per Week: 3 days     Minutes of Exercise per Session: 60 min   Stress: No Stress Concern Present (9/7/2023)    [employer] - Occupational Stress Questionnaire     Feeling of Stress : Only a little   Housing Stability: Low Risk  (9/7/2023)    Housing Stability Vital Sign     Unable to Pay for Housing in the Last Year: No     Number of Places Lived in the Last Year: 1     Unstable Housing in the Last Year: No     OBJECTIVE:    General appearance: Well appearing, in no acute distress, alert and oriented x3.  Psych:   Mood and affect appropriate.    There were no vitals taken for this visit.    PHYSICAL EXAMINATION:  General appearance: Well appearing, in no acute distress, alert and appropriately communicative.  Psych:  Mood and affect appropriate.  Skin: Skin color, texture, turgor normal, no rashes or lesions, in both upper and lower body.  Head/face:  Atraumatic, normocephalic.  Cor: regular rate  Pulm: non-labored breathing  GI: Abdomen non-distended and non-tender.  Back: Straight leg raising in the sitting and supine positions is negative to radicular pain. No pain to palpation over the spine or paraspinal muscles. Pain with flexion  Extremities: Peripheral joint ROM is full and pain free without obvious instability or laxity in all four extremities.  No deformities, edema, or skin discoloration. Good capillary refill.  Musculoskeletal: shoulder, hip, sacroiliac and knee provocative maneuvers are negative with the exception of right painful arc 60-120deg; right empty can+, right pain on external rotation. Bilateral upper and lower extremity strength is normal and symmetric.  No atrophy or tone abnormalities are noted.  Neuro: Bilateral upper and lower extremity coordination and muscle stretch reflexes are physiologic and symmetric.  Negative Clonus. No loss of sensation is noted.  Gait: Normal.    ASSESSMENT: 60 y.o. year old male with lower back/hip pain, consistent with:     1. Degeneration of intervertebral disc of lumbar region with discogenic back pain  Procedure Order to Pain Management    CANCELED: Procedure Order to Pain Management      2. Lumbar radiculopathy  Procedure Order to Pain Management    CANCELED: Procedure Order to Pain Management          IMPRESSION: Mr. Smith presents for recurrent lower back pain. History and physical exam are consistent with axial lower back pain/lumbar vertebrogenic pain  Imaging is consistent with lumar degenerative disc disease which is worst at L4/5 and L5/S1 associated with  L4, L5, and S1 modic type 2 changes.   He had excellent relief with prior lumbar epidural injection, so we will repeat this for now. He may also be a candidate for L4, L5, S1 intracept procedure, although he was denied by his insurer last time.    PLAN:  - I have stressed the importance of physical activity and a home exercise plan to help with pain and improve health.  - Patient can continue with medications for now since they are providing benefits, using them appropriately, and without side effects.  - ok to use voltaren 1% topical for myofascial pain; do not use over open sores/wounds  - he can use ibuprofen for his pain for now; script given for ibuprofen 800mg TID as needed  - schedule for B/L L5/S1 transforaminal epidural steroid injection; marked urgent due to rapid functional decline due to pain  - we discussed repeat MRI lumbar spine, as his pain is worse. However he declined this for now.  - he has an appointment to see spine surgery tomorrow  - he may be a candidate for L4, L5, S1 intracept procedure, pending insurance approval  - continue home exercises as tolerated  - I counseled on maintaining his home exercise program  - Counseled patient regarding the importance of activity modification and physical therapy.  - RTC in person after epidural injection    The above plan and management options were discussed at length with patient. Patient is in agreement with the above and verbalized understanding.    Any Thomas  04/15/2025

## 2025-04-15 NOTE — PROGRESS NOTES
Chronic Pain-Tele-Medicine-Established Note (Follow up visit)      The patient location is: lower back pain  The chief complaint leading to consultation is: lower back pain  Visit type: Virtual visit with synchronous audio and video  Total time spent with patient: 20 minutes  Each patient to whom he or she provides medical services by telemedicine is:  (1) informed of the relationship between the physician and patient and the respective role of any other health care provider with respect to management of the patient; and (2) notified that he or she may decline to receive medical services by telemedicine and may withdraw from such care at any time.    Notes:     SUBJECTIVE:    Pain Disability Index Review:      6/21/2024     1:07 PM 10/31/2023     9:23 AM   Last 3 PDI Scores   Pain Disability Index (PDI) 56 0     INTERVAL HISTORY 4/15/2025:  Mr. Carrasquillo returns for lower back pain. Current Pain level is 10/10.  He feels pain in his hips and his buttock.   He feels the pain is worse than it was in the past.  He reports he finds it difficult to put his clothes on, he can't turn side-to-side in his bed. He has an appointment to see a spine surgeon tomorrow.    INTERVAL HISTORY 9/30/2024:  Mr. Smith returns for right shoulder pain. Current Pain level is 10/10.  He states that his lower back feels much better since his epidural injection. He has difficulty at night with moving the right shoulder.  He hasn't tried taking any medications for this shoulder pain. He hasn't tried physical therapy for his right shoulder. He does report excellent relief of his right shoulder pain after his subacromial bursa injection on 6/21/24, but reports the relief was short lived. He is interested in repeating this.    INTERVAL HISTORY 6/21/2024:  Mr. Smith returns for right shoulder, right thumb, and lower back pain. Current Pain level is 9/10.  He reports that last night the shoulder was unbearable and wanted to return to clinic for  a steroid injection as soon as possible.  He continues to have lower back pain, and is tentatively scheduled for a bilateral L5/S1 transforaminal epidural steroid injection for vertebrogenic pain.    INTERVAL HISTORY 6/20/2024:  Ms. Lau presents for chronic back pain. Current Pain level is 9/10.  He reports that he continues to do his home exercises and feels that it does help a little. However, he is noticing diminishing returns. He continues to feel the pain is across his lower back like a band, and is worse with lifting/flexing forward, and at night while lying in bed.  He completed the healthy back program and he has continued maintaining the exercises as much as tolerated.  He also reports right shoulder pain and right hand pain which is new since his last visit.  He does report that he had similar left hand pain which resolved after a steroid injection.    INTERVAL HISTORY 9/19/23:  Mr. Smith presents today with history of lower back pain. His current pain level is 0/10.  He feels his pain is better controlled at this point.  He has been playing golf more frequently now. He reports he had an event recently and had a small exacerbation which went away with his tizanidine and ibuprofen.  He feels satisfied with his improvement. He is looking forward to a golf tournament in December.  He still has his first appointment with healthy back program in mid-November.    INTERVAL HISTORY 9/19/23:  He states that he has been trying to get with physical therapy, however he has been having issues with scheduling.  He has started some exercises at home, and he feels his pain has been improving.  He reports that he has been able to take less of the tizanidine and ibuprofen. He is anxious about his hip, as he was told by a surgeon that he may have a hip fracture.  He does admit to some soreness around the left hip.  He also admits that he does sometimes wake up when he turns to the left or right side.    INITIAL HPI  9/12/23:  Calvin Smith presents to the clinic for the evaluation of lower back pain. He states he has had back pain for 20 years, however he has had exacerbations off/on.  He states that the pain is just in the lower back (no radiation down his legs). He presented to the emergency room on 9/7/23 due to the most severe exacerbation while toweling himself off after a bath.The pain is located in the lower back area and radiates to the sides of the back (not down the legs).  The pain is described as aching and is rated as 6/10. The pain is rated with a score of  0/10 on the BEST day and a score of 10/10 on the WORST day.  Symptoms interfere with daily activity. The pain is exacerbated by Extension and Flexing.  The pain is mitigated by medications.  The patient reports 4 hours of uninterrupted sleep per night (not related to pain).     Patient denies urinary incontinence, bowel incontinence, and significant motor weakness.      Physical Therapy/Home Exercise: yes, currently consistent with home exercise program     Pain Medications:   - ibuprofen 600mg   - robaxin 500mg      report:  Reviewed and consistent with medication use as prescribed.     Pain Procedures:   Had Prior Epidural with no relief.     Imaging:   MRI SHOULDER WITHOUT CONTRAST RIGHT     CLINICAL HISTORY:  Shoulder pain, labral tear suspected, xray done;  Pain in right shoulder     TECHNIQUE:  Multiplanar multisequence MRI examination of right shoulder.     COMPARISON:  Right shoulder radiograph 06/20/2024.     FINDINGS:  ROTATOR CUFF:     Supraspinatus: Partial tear at the footplate of the supraspinatus 2 x 7 mm with associated tendinosis.     Infraspinatus: Intact.  Distal aspect intermediate signal, consistent with tendinosis.     Subscapularis: Distal aspect intermediate signal, consistent with tendinosis.     Teres Minor: Intact.  No tendinosis.     There is small volume of fluid within the subdeltoid bursa.     LABRUM: Superior labrum appears  "grossly intact on this standard non arthrogram exam.Posterior aspect of superior labrum ( "peel-back" location) appears intact. Posterior labrum tear at the level of the mid equator.  Anterior inferior labrum appears intact. IGHL:Intact.     LONG HEAD BICEPS TENDON: Located within bicipital groove and intact yet attenuated.Biceps-labral anchor is intact. Intermediate signal, consistent with tendinosis.  No tenosynovitis. Rotator Interval is normal. Biceps pulley is intact.     BONES: No evident fracture.Mild bone marrow edema of the acromioclavicular joint.  AC joint demonstrates normal alignment with moderate hypertrophy.No significant osteo-acromial outlet narrowing..  There is no evident os acromiale.     CARTILAGE: Humeral head and glenoid cartilage preserved without focal defects. No subchondral marrow edema.  No synovial abnormality or intra-articular loose bodies. Glenoid fossa demonstrates no sclerosis.     MUSCLES:  Mild fatty atrophy of the subscapularis muscle and teres minor muscle.  The remainder of the muscle bulk is within normal limits.     Impression:     1. Partial thickness tear at the articular aspect of the footplate of the supraspinatus tendon.  2. Tendinosis of the infraspinatus, subscapularis, and biceps tendons.  3. Mild fatty atrophy of the subscapularis and teres minor muscles.  4. Posterior labral tear mid equator level.     Electronically signed by resident: Paulette Archer  Date:                                            09/27/2024  Time:                                           08:29     Electronically signed by:Dc Quiñones MD  Date:                                            09/27/2024  Time:                                           09:39    XR SHOULDER TRAUMA 3 VIEW RIGHT     CLINICAL HISTORY:  Unspecified injury of right shoulder and upper arm, subsequent encounter     TECHNIQUE:  Three or four views of the right shoulder were performed.     COMPARISON:  None     FINDINGS:  Bones " are well mineralized.  The glenohumeral joint and AC joint appear intact.  No fracture or dislocation is seen.  No significant degenerative changes.  No soft tissue abnormality.     Impression:     No acute abnormality        Electronically signed by:Eduardo Armstrong MD  Date:                                            06/20/2024  Time:                                           12:02    XR HAND COMPLETE 3 VIEW RIGHT     CLINICAL HISTORY:  Pain in right hand     TECHNIQUE:  PA, lateral, and oblique views of the right hand were performed.     COMPARISON:  None     FINDINGS:  Bones are well mineralized.  Alignment is satisfactory and joint spaces appear adequately maintained.  No fracture, dislocation, or erosive change.  No significant degenerative changes.  No soft tissue abnormality.     Impression:     No acute abnormality        Electronically signed by:Eduardo Armstrong MD  Date:                                            06/20/2024  Time:                                           12:04    XR HIPS BILATERAL 2 VIEW INCL AP PELVIS     CLINICAL HISTORY:  Trochanteric bursitis, unspecified hip     TECHNIQUE:  AP view of the pelvis and frogleg lateral views of both hips were performed.     COMPARISON:  None     FINDINGS:  Femoral heads are well located with respect to the acetabula.  Femoral heads maintain a normal round contour.  No acute fracture seen.  Mild osteophyte formation on the left without significant joint space narrowing.     Impression:     No acute osseous abnormality seen.        Electronically signed by: Jacque Oropeza  Date:                                            09/19/2023  Time:                                           10:21    MRI LUMBAR SPINE WITHOUT CONTRAST     CLINICAL HISTORY:  Spinal stenosis, lumbar;     TECHNIQUE:  Multiplanar, multisequence MR images were acquired from the thoracolumbar junction to the sacrum without contrast.     COMPARISON:  CT 09/06/2023, x-ray 09/06/2023      FINDINGS:  Alignment: Mild levo scoliosis.  Minimal grade 1 retrolisthesis L4-5.  Bilateral spondylolysis L5 with grade 1 anterolisthesis L5-S1.     Vertebrae: Benign osseous hemangioma at L4.  Chronic degenerative endplate change at L4-L5 and L5-S1.     Discs: Degenerative disc desiccation from L3-L4 through L5-S1 with mild L4-L5 and severe L5-S1 height loss.     Cord: Conus terminates at L1 and appears unremarkable.  Cauda equina appears unremarkable.     Degenerative findings:     *T12-L1: No spinal canal stenosis or neural foraminal narrowing.  *L1-L2: No spinal canal stenosis or neural foraminal narrowing.  *L2-L3: No spinal canal stenosis or neural foraminal narrowing.  *L3-L4: Mild broad-based posterior disc bulge with right subarticular protrusion that causes mass effect on the right lateral recess and may impinge upon the right descending L4 nerve root.  Bilateral facet arthropathy and bilateral ligamentum flavum hypertrophy.  Mild spinal canal and moderate right lateral recess stenosis.  *L4-L5: Circumferential disc bulge.  Bilateral facet arthropathy and bilateral ligamentum flavum hypertrophy.  Mild-to-moderate bilateral lateral recess stenosis with possible impingement of the right descending L5 nerve root.  Moderate right and mild left neural foraminal narrowing.  *L5-S1: Grade 1 anterolisthesis with uncovering of the intervertebral disc.  Bilateral facet arthropathy.  No spinal canal stenosis.  Moderate to severe bilateral neural foraminal narrowing with suspected impingement of the exiting L5 nerve roots.  Paraspinal muscles & soft tissues: Right simple renal cyst.     Impression:     1. Bilateral L5 spondylolysis with grade 1 anterolisthesis of L5 on S1 contributing to moderate to severe bilateral neural foraminal narrowing with suspected impingement of the exiting L5 nerve roots.  2. Additional lumbar spondylosis at L3-L4 and L4-L5 as detailed above.     Electronically signed by resident: Phil  Shazia  Date:                                            09/07/2023  Time:                                           14:11     Electronically signed by: Myles Orozco MD  Date:                                            09/07/2023  Time:                                           15:43         CT LUMBAR SPINE WITHOUT CONTRAST     CLINICAL HISTORY:  Low back pain, no red flags, no prior management;     TECHNIQUE:  Low-dose axial, sagittal and coronal reformations are obtained through the lumbar spine.  Contrast was not administered.     COMPARISON:  X-ray 09/06/2023     FINDINGS:  No acute fractures of the lumbar spine.     Moderate disc space narrowing at L5-S1.  Grade 1 spondylolisthesis of L5 on S1.  Minimal grade 1 retrolisthesis of L4 on L5.     Bilateral spondylolysis of L5 with associated facet arthropathy at L5-S1 and L4-5.     L1-2: No significant abnormality.     L2-3: No significant abnormality.     L3-4: Mild diffuse disc protrusion.  Mild ligamentum flavum hypertrophy.  Severe central canal narrowing.  Neural foramen are adequately maintained.     L4-5: Mild diffuse posterior disc osteophyte complex with mild disc protrusion.  Bilateral facet arthropathy.  Mild-moderate central canal narrowing.  Severe right foraminal narrowing.     L5-S1: Bilateral spondylolysis with grade 1 spondylolisthesis and slight uncovering of the disc posteriorly with mild protrusion.  Severe bilateral foraminal narrowing.  Central canal is adequately maintained.     The remaining visualized paravertebral structures demonstrate no pathology.     Impression:     No acute abnormality.     Multilevel chronic and degenerative changes.  See above comments.        Electronically signed by: Owen Sinclair  Date:                                            09/06/2023  Time:                                           20:05     XR LUMBAR SPINE AP AND LATERAL     CLINICAL HISTORY:  low back pain;     TECHNIQUE:  AP, lateral and spot  images were performed of the lumbar spine.     COMPARISON:  None     FINDINGS:  Five lumbar vertebral bodies.  Vertebral body heights are maintained.     Disc space narrowing and endplate changes L5-S1.     Grade 1 anterolisthesis L5-S1 suspected to be on account of pars defects.     Impression:     Please see above.        Electronically signed by: Jacque Oropeza  Date:                                            09/06/2023  Time:                                           16:35    Allergies: Review of patient's allergies indicates:  No Known Allergies    Current Medications:   Current Outpatient Medications   Medication Sig Dispense Refill    ALPRAZolam (XANAX) 0.5 MG tablet 1 po qd prn anxiety (Patient not taking: Reported on 2/27/2025) 10 tablet 1    aspirin (ECOTRIN) 81 MG EC tablet Take 1 tablet (81 mg total) by mouth once daily. 90 tablet 3    atorvastatin (LIPITOR) 10 MG tablet TAKE 1 TABLET ONCE DAILY 90 tablet 3    azithromycin (ZITHROMAX) 500 MG tablet 1 po qd x 3d for travel diarrhea (Patient not taking: Reported on 2/27/2025) 3 tablet 0    diclofenac sodium (VOLTAREN ARTHRITIS PAIN) 1 % Gel Apply 2 g topically 4 (four) times daily. (Patient not taking: Reported on 2/27/2025) 100 g 2    fluticasone propionate (FLONASE) 50 mcg/actuation nasal spray SHAKE LIQUID AND USE 1 SPRAY IN EACH NOSTRIL TWICE DAILY 48 g 3    ibuprofen (ADVIL,MOTRIN) 800 MG tablet Take 1 tablet (800 mg total) by mouth every 8 (eight) hours as needed for Pain. 90 tablet 2    LIDOcaine (LIDODERM) 5 % Place 2 patches onto the skin once daily. Remove & Discard patch within 12 hours or as directed by MD (Patient not taking: Reported on 2/27/2025) 15 patch 0    metFORMIN (GLUCOPHAGE-XR) 500 MG ER 24hr tablet TAKE 1 TABLET DAILY WITH   BREAKFAST 90 tablet 3    scopolamine (TRANSDERM-SCOP) 1.3-1.5 mg (1 mg over 3 days) Place 1 patch onto the skin every 72 hours. (Patient not taking: Reported on 2/27/2025) 10 patch 0    sertraline (ZOLOFT) 50  MG tablet Take 1 tablet (50 mg total) by mouth once daily. 90 tablet 3    SOOLANTRA 1 % Crea Apply topically. (Patient not taking: Reported on 2/27/2025)      walker Misc 1 each by Misc.(Non-Drug; Combo Route) route once. Rolling walker for 1 dose 1 each 0     No current facility-administered medications for this visit.       REVIEW OF SYSTEMS:    GENERAL:  No weight loss, malaise or fevers.  HEENT:  Negative for frequent or significant headaches.  NECK:  Negative for lumps, goiter, pain and significant neck swelling.  RESPIRATORY:  Negative for cough, wheezing or shortness of breath.  CARDIOVASCULAR:  Negative for chest pain, leg swelling or palpitations.  GI:  Negative for abdominal discomfort, blood in stools or black stools or change in bowel habits.  MUSCULOSKELETAL:  See HPI.  SKIN:  Negative for lesions, rash, and itching.  PSYCH:  Negative for sleep disturbance, mood disorder and recent psychosocial stressors.  HEMATOLOGY/LYMPHOLOGY:  Negative for prolonged bleeding, bruising easily or swollen nodes.  NEURO:   No history of headaches, syncope, paralysis, seizures or tremors.  All other reviewed and negative other than HPI.    Past Medical History:  Past Medical History:   Diagnosis Date    Anxiety     Colon polyp     Fatty liver     TIA (transient ischemic attack)        Past Surgical History:  Past Surgical History:   Procedure Laterality Date    APPENDECTOMY  2005    COLONOSCOPY  11/2019    EPIDURAL STEROID INJECTION INTO LUMBAR SPINE  2018    INJECTION OF ANESTHETIC AGENT AROUND MEDIAL BRANCH NERVES INNERVATING LUMBAR FACET JOINT  2019    INJECTION, SPINE, LUMBOSACRAL, TRANSFORAMINAL APPROACH Bilateral 7/2/2024    Procedure: Bilateral L5/S1 TFESI;  Surgeon: Any Thomas MD;  Location: [facility];  Service: Pain Management;  Laterality: Bilateral;  20 mins  ASA 5 days       Family History:  Family History   Problem Relation Name Age of Onset    No Known Problems Mother      Leukemia Father       No Known Problems Sister      No Known Problems Brother      No Known Problems Sister      No Known Problems Brother         Social History:  Social History     Socioeconomic History    Marital status:      Spouse name: Leigh Reese    Number of children: 2   Tobacco Use    Smoking status: Never    Smokeless tobacco: Never   Substance and Sexual Activity    Drug use: Never    Sexual activity: Yes   Social History Narrative    He is satisfied with weight.    Rates diet as fair to good.    He does not drink at least 1/2 gallon water daily.    He drinks 1-2 coffee/tea/caffeine-containing soft drinks daily.    Total sleep time at night is 6-7 hours (poor quality).    He works 40-50 hours per week.    He does wear seat belts.    Hobbies include tennis.     Social Drivers of Health     Financial Resource Strain: Low Risk  (9/7/2023)    Overall Financial Resource Strain (CARDIA)     Difficulty of Paying Living Expenses: Not hard at all   Food Insecurity: No Food Insecurity (9/7/2023)    Hunger Vital Sign     Worried About Running Out of Food in the Last Year: Never true     Ran Out of Food in the Last Year: Never true   Transportation Needs: No Transportation Needs (9/7/2023)    PRAPARE - Transportation     Lack of Transportation (Medical): No     Lack of Transportation (Non-Medical): No   Physical Activity: Sufficiently Active (9/7/2023)    Exercise Vital Sign     Days of Exercise per Week: 3 days     Minutes of Exercise per Session: 60 min   Stress: No Stress Concern Present (9/7/2023)    [employer] - Occupational Stress Questionnaire     Feeling of Stress : Only a little   Housing Stability: Low Risk  (9/7/2023)    Housing Stability Vital Sign     Unable to Pay for Housing in the Last Year: No     Number of Places Lived in the Last Year: 1     Unstable Housing in the Last Year: No     OBJECTIVE:    General appearance: Well appearing, in no acute distress, alert and oriented x3.  Psych:   Mood and affect appropriate.    There were no vitals taken for this visit.    PHYSICAL EXAMINATION:  General appearance: Well appearing, in no acute distress, alert and appropriately communicative.  Psych:  Mood and affect appropriate.  Skin: Skin color, texture, turgor normal, no rashes or lesions, in both upper and lower body.  Head/face:  Atraumatic, normocephalic.  Cor: regular rate  Pulm: non-labored breathing  GI: Abdomen non-distended and non-tender.  Back: Straight leg raising in the sitting and supine positions is negative to radicular pain. No pain to palpation over the spine or paraspinal muscles. Pain with flexion  Extremities: Peripheral joint ROM is full and pain free without obvious instability or laxity in all four extremities.  No deformities, edema, or skin discoloration. Good capillary refill.  Musculoskeletal: shoulder, hip, sacroiliac and knee provocative maneuvers are negative with the exception of right painful arc 60-120deg; right empty can+, right pain on external rotation. Bilateral upper and lower extremity strength is normal and symmetric.  No atrophy or tone abnormalities are noted.  Neuro: Bilateral upper and lower extremity coordination and muscle stretch reflexes are physiologic and symmetric.  Negative Clonus. No loss of sensation is noted.  Gait: Normal.    ASSESSMENT: 60 y.o. year old male with lower back/hip pain, consistent with:     1. Degeneration of intervertebral disc of lumbar region with discogenic back pain  Procedure Order to Pain Management    CANCELED: Procedure Order to Pain Management      2. Lumbar radiculopathy  Procedure Order to Pain Management    CANCELED: Procedure Order to Pain Management          IMPRESSION: Mr. Smith presents for recurrent lower back pain. History and physical exam are consistent with axial lower back pain/lumbar vertebrogenic pain  Imaging is consistent with lumar degenerative disc disease which is worst at L4/5 and L5/S1 associated with  L4, L5, and S1 modic type 2 changes.   He had excellent relief with prior lumbar epidural injection, so we will repeat this for now. He may also be a candidate for L4, L5, S1 intracept procedure, although he was denied by his insurer last time.    PLAN:  - I have stressed the importance of physical activity and a home exercise plan to help with pain and improve health.  - Patient can continue with medications for now since they are providing benefits, using them appropriately, and without side effects.  - ok to use voltaren 1% topical for myofascial pain; do not use over open sores/wounds  - he can use ibuprofen for his pain for now; script given for ibuprofen 800mg TID as needed  - schedule for B/L L5/S1 transforaminal epidural steroid injection; marked urgent due to rapid functional decline due to pain  - we discussed repeat MRI lumbar spine, as his pain is worse. However he declined this for now.  - he has an appointment to see spine surgery tomorrow  - he may be a candidate for L4, L5, S1 intracept procedure, pending insurance approval  - continue home exercises as tolerated  - I counseled on maintaining his home exercise program  - Counseled patient regarding the importance of activity modification and physical therapy.  - RTC in person after epidural injection    The above plan and management options were discussed at length with patient. Patient is in agreement with the above and verbalized understanding.    Any Thomas  04/15/2025

## 2025-04-16 ENCOUNTER — PATIENT MESSAGE (OUTPATIENT)
Dept: PAIN MEDICINE | Facility: CLINIC | Age: 60
End: 2025-04-16
Payer: COMMERCIAL

## 2025-04-16 ENCOUNTER — TELEPHONE (OUTPATIENT)
Dept: PAIN MEDICINE | Facility: CLINIC | Age: 60
End: 2025-04-16
Payer: COMMERCIAL

## 2025-04-16 NOTE — TELEPHONE ENCOUNTER
----- Message from Roe sent at 4/16/2025 10:10 AM CDT -----   Name of Who is Calling: What is the request in detail: patient request call back in reference to have been discussing issue with dr nicholson over my [facility] and want to know which  hospital to go to  Please contact to further discuss and advise  Can the clinic reply by [facility]: What Number to Call Back if not in [facility]:   [phone]

## 2025-04-24 ENCOUNTER — HOSPITAL ENCOUNTER (OUTPATIENT)
Facility: OTHER | Age: 60
Discharge: HOME | End: 2025-04-24
Attending: STUDENT IN AN ORGANIZED HEALTH CARE EDUCATION/TRAINING PROGRAM | Admitting: STUDENT IN AN ORGANIZED HEALTH CARE EDUCATION/TRAINING PROGRAM
Payer: COMMERCIAL

## 2025-04-24 VITALS
HEART RATE: 62 BPM | OXYGEN SATURATION: 98 % | DIASTOLIC BLOOD PRESSURE: 69 MMHG | SYSTOLIC BLOOD PRESSURE: 120 MMHG | RESPIRATION RATE: 16 BRPM | TEMPERATURE: 98 F

## 2025-04-24 DIAGNOSIS — G89.29 CHRONIC PAIN: ICD-10-CM

## 2025-04-24 DIAGNOSIS — M54.16 LUMBAR RADICULOPATHY: Primary | ICD-10-CM

## 2025-04-24 PROCEDURE — 64483 NJX AA&/STRD TFRM EPI L/S 1: CPT | Mod: 50,,, | Performed by: STUDENT IN AN ORGANIZED HEALTH CARE EDUCATION/TRAINING PROGRAM

## 2025-04-24 PROCEDURE — 25500020 PHARM REV CODE 255: Performed by: STUDENT IN AN ORGANIZED HEALTH CARE EDUCATION/TRAINING PROGRAM

## 2025-04-24 PROCEDURE — 63600175 PHARM REV CODE 636 W HCPCS: Performed by: STUDENT IN AN ORGANIZED HEALTH CARE EDUCATION/TRAINING PROGRAM

## 2025-04-24 PROCEDURE — 64483 NJX AA&/STRD TFRM EPI L/S 1: CPT | Mod: 50 | Performed by: STUDENT IN AN ORGANIZED HEALTH CARE EDUCATION/TRAINING PROGRAM

## 2025-04-24 RX ORDER — LIDOCAINE HYDROCHLORIDE 20 MG/ML
INJECTION, SOLUTION INFILTRATION; PERINEURAL
Status: DISCONTINUED | OUTPATIENT
Start: 2025-04-24 | End: 2025-04-24 | Stop reason: HOSPADM

## 2025-04-24 RX ORDER — FENTANYL CITRATE 50 UG/ML
INJECTION, SOLUTION INTRAMUSCULAR; INTRAVENOUS
Status: DISCONTINUED | OUTPATIENT
Start: 2025-04-24 | End: 2025-04-24 | Stop reason: HOSPADM

## 2025-04-24 RX ORDER — SODIUM CHLORIDE 9 MG/ML
INJECTION, SOLUTION INTRAVENOUS CONTINUOUS
Status: DISCONTINUED | OUTPATIENT
Start: 2025-04-24 | End: 2025-04-24 | Stop reason: HOSPADM

## 2025-04-24 RX ORDER — LIDOCAINE HYDROCHLORIDE 10 MG/ML
INJECTION, SOLUTION EPIDURAL; INFILTRATION; INTRACAUDAL; PERINEURAL
Status: DISCONTINUED | OUTPATIENT
Start: 2025-04-24 | End: 2025-04-24 | Stop reason: HOSPADM

## 2025-04-24 RX ORDER — MIDAZOLAM HYDROCHLORIDE 1 MG/ML
INJECTION INTRAMUSCULAR; INTRAVENOUS
Status: DISCONTINUED | OUTPATIENT
Start: 2025-04-24 | End: 2025-04-24 | Stop reason: HOSPADM

## 2025-04-24 RX ORDER — DEXAMETHASONE SODIUM PHOSPHATE 10 MG/ML
INJECTION, SOLUTION INTRA-ARTICULAR; INTRALESIONAL; INTRAMUSCULAR; INTRAVENOUS; SOFT TISSUE
Status: DISCONTINUED | OUTPATIENT
Start: 2025-04-24 | End: 2025-04-24 | Stop reason: HOSPADM

## 2025-04-24 NOTE — OP NOTE
Lumbar Transforaminal Epidural Steroid Injection under Fluoroscopic Guidance    The procedure, risks, benefits, and options were discussed with the patient. There are no contraindications to the procedure. The patent expressed understanding and agreed to the procedure. Informed written consent was obtained prior to the start of the procedure and can be found in the patient's chart.    PATIENT NAME: Calvin Smith   MRN: [id number]     DATE OF PROCEDURE: 04/24/2025    PROCEDURE:  Bilateral  L5/S1 Lumbar Transforaminal Epidural Steroid Injection under Fluoroscopic Guidance    PRE-OP DIAGNOSIS: Lumbar radiculopathy [M54.16] Lumbar radiculopathy [M54.16]    POST-OP DIAGNOSIS: Same    PHYSICIAN: Any Thomas MD    ASSISTANTS: Sari Roe DO     MEDICATIONS INJECTED: Preservative-free Decadron 10mg with 5cc of Lidocaine 1% MPF     LOCAL ANESTHETIC INJECTED: Xylocaine 2%     SEDATION: Versed 2mg and Fentanyl 50mcg                                                                                                                                                                                     Conscious sedation ordered by M.CONNIE Patient re-evaluation prior to administration of conscious sedation. No changes noted in patient's status from initial evaluation. The patient's vital signs were monitored by RN and patient remained hemodynamically stable throughout the procedure.    Event Time In   Sedation Start 0949   Sedation End 0957       ESTIMATED BLOOD LOSS: None    COMPLICATIONS: None    TECHNIQUE: Time-out was performed to identify the patient and procedure to be performed. With the patient laying in a prone position, the surgical area was prepped and draped in the usual sterile fashion using ChloraPrep and a fenestrated drape.The levels were determined under fluoroscopy guidance. Skin anesthesia was achieved by injecting Lidocaine 2% over the injection sites. The transforaminal spaces were then approached with a 22 gauge, 5  inch spinal quinke needle that was introduced under fluoroscopic guidance in the AP and Lateral views. Once the needle tip was in the area of the transforaminal space, and there was no blood, CSF or paraesthesias, contrast dye Omnipaque (300mg/mL) was injected to confirm placement and there was no vascular runoff. Fluoroscopic imaging in the AP and lateral views revealed a clear outline of the spinal nerve with proximal spread of agent through the neural foramen into the epidural space. 3 mL of the medication mixture listed above was injected slowly at each site. Displacement of the radio opaque contrast after injection of the medication confirmed that the medication went into the area of the transforaminal spaces. The needles were removed and bleeding was nil. A sterile dressing was applied. No specimens collected. The patient tolerated the procedure well.     The patient was monitored after the procedure in the recovery area. They were given post-procedure and discharge instructions to follow at home. The patient was discharged in a stable condition.    Any Thomas MD

## 2025-04-24 NOTE — DISCHARGE SUMMARY
Discharge Note  Short Stay      SUMMARY     Admit Date: 4/24/2025    Attending Physician: Any Thomas MD PhD    Discharge Physician: Any Thomas      Discharge Date: 4/24/2025 9:57 AM    Procedure(s) (LRB):  LUMBAR TRANSFORAMINAL BILATERAL L5/S1 MEDICALLY URGENT (Bilateral)    Final Diagnosis: Lumbar radiculopathy [M54.16]    Disposition: Home or self care    Patient Instructions:   Current Discharge Medication List        CONTINUE these medications which have NOT CHANGED    Details   aspirin (ECOTRIN) 81 MG EC tablet Take 1 tablet (81 mg total) by mouth once daily.  Qty: 90 tablet, Refills: 3      atorvastatin (LIPITOR) 10 MG tablet TAKE 1 TABLET ONCE DAILY  Qty: 90 tablet, Refills: 3    Associated Diagnoses: Dyslipidemia      fluticasone propionate (FLONASE) 50 mcg/actuation nasal spray SHAKE LIQUID AND USE 1 SPRAY IN EACH NOSTRIL TWICE DAILY  Qty: 48 g, Refills: 3      ibuprofen (ADVIL,MOTRIN) 800 MG tablet Take 1 tablet (800 mg total) by mouth every 8 (eight) hours as needed for Pain.  Qty: 90 tablet, Refills: 2    Associated Diagnoses: Lumbar spondylosis; Spondylolisthesis of lumbar region      metFORMIN (GLUCOPHAGE-XR) 500 MG ER 24hr tablet TAKE 1 TABLET DAILY WITH   BREAKFAST  Qty: 90 tablet, Refills: 3    Associated Diagnoses: Prediabetes      sertraline (ZOLOFT) 50 MG tablet Take 1 tablet (50 mg total) by mouth once daily.  Qty: 90 tablet, Refills: 3    Associated Diagnoses: Anxiety      walker Misc 1 each by Misc.(Non-Drug; Combo Route) route once. Rolling walker for 1 dose  Qty: 1 each, Refills: 0           STOP taking these medications       ALPRAZolam (XANAX) 0.5 MG tablet Comments:   Reason for Stopping:         azithromycin (ZITHROMAX) 500 MG tablet Comments:   Reason for Stopping:         diclofenac sodium (VOLTAREN ARTHRITIS PAIN) 1 % Gel Comments:   Reason for Stopping:         LIDOcaine (LIDODERM) 5 % Comments:   Reason for Stopping:         scopolamine (TRANSDERM-SCOP) 1.3-1.5 mg (1 mg over 3  days) Comments:   Reason for Stopping:         SOOLANTRA 1 % Crea Comments:   Reason for Stopping:                   Discharge Diagnosis: Lumbar radiculopathy [M54.16]  Condition on Discharge: Stable with no complications to procedure   Diet on Discharge: Same as before.  Activity: as per instruction sheet.  Discharge to: Home with a responsible adult.  Follow up: 2-4 weeks       Please call my office or pager at [phone] if experienced any weakness or loss of sensation, fever > 101.5, pain uncontrolled with oral medications, persistent nausea/vomiting/or diarrhea, redness or drainage from the incisions, or any other worrisome concerns. If physician on call was not reached or could not communicate with our office for any reason please go to the nearest emergency department      Any Thomas MD PhD

## 2025-04-24 NOTE — DISCHARGE INSTRUCTIONS

## 2025-04-25 ENCOUNTER — PATIENT MESSAGE (OUTPATIENT)
Dept: PAIN MEDICINE | Facility: CLINIC | Age: 60
End: 2025-04-25
Payer: COMMERCIAL

## 2025-05-02 ENCOUNTER — PATIENT MESSAGE (OUTPATIENT)
Dept: PAIN MEDICINE | Facility: CLINIC | Age: 60
End: 2025-05-02
Payer: COMMERCIAL

## 2025-05-06 ENCOUNTER — PATIENT MESSAGE (OUTPATIENT)
Dept: PAIN MEDICINE | Facility: CLINIC | Age: 60
End: 2025-05-06
Payer: COMMERCIAL

## 2025-05-06 DIAGNOSIS — M54.16 LUMBAR RADICULOPATHY: Primary | ICD-10-CM

## 2025-06-03 ENCOUNTER — OFFICE VISIT (OUTPATIENT)
Dept: NEUROSURGERY | Facility: CLINIC | Age: 60
End: 2025-06-03
Payer: COMMERCIAL

## 2025-06-03 VITALS — BODY MASS INDEX: 25.53 KG/M2 | WEIGHT: 177.94 LBS

## 2025-06-03 DIAGNOSIS — M54.16 LUMBAR RADICULOPATHY: ICD-10-CM

## 2025-06-03 PROCEDURE — 99999 PR PBB SHADOW E&M-EST. PATIENT-LVL III: CPT | Mod: PBBFAC,,, | Performed by: STUDENT IN AN ORGANIZED HEALTH CARE EDUCATION/TRAINING PROGRAM

## 2025-06-03 PROCEDURE — 3044F HG A1C LEVEL LT 7.0%: CPT | Mod: CPTII,S$GLB,, | Performed by: STUDENT IN AN ORGANIZED HEALTH CARE EDUCATION/TRAINING PROGRAM

## 2025-06-03 PROCEDURE — 3008F BODY MASS INDEX DOCD: CPT | Mod: CPTII,S$GLB,, | Performed by: STUDENT IN AN ORGANIZED HEALTH CARE EDUCATION/TRAINING PROGRAM

## 2025-06-03 PROCEDURE — 99203 OFFICE O/P NEW LOW 30 MIN: CPT | Mod: S$GLB,,, | Performed by: STUDENT IN AN ORGANIZED HEALTH CARE EDUCATION/TRAINING PROGRAM

## 2025-06-03 PROCEDURE — 1159F MED LIST DOCD IN RCRD: CPT | Mod: CPTII,S$GLB,, | Performed by: STUDENT IN AN ORGANIZED HEALTH CARE EDUCATION/TRAINING PROGRAM

## 2025-07-10 ENCOUNTER — PATIENT MESSAGE (OUTPATIENT)
Dept: PAIN MEDICINE | Facility: CLINIC | Age: 60
End: 2025-07-10
Payer: COMMERCIAL

## 2025-07-10 ENCOUNTER — OFFICE VISIT (OUTPATIENT)
Dept: INTERNAL MEDICINE | Facility: CLINIC | Age: 60
End: 2025-07-10
Payer: COMMERCIAL

## 2025-07-10 ENCOUNTER — HOSPITAL ENCOUNTER (OUTPATIENT)
Dept: RADIOLOGY | Facility: HOSPITAL | Age: 60
Discharge: HOME | End: 2025-07-10
Attending: FAMILY MEDICINE
Payer: COMMERCIAL

## 2025-07-10 VITALS
OXYGEN SATURATION: 95 % | HEART RATE: 104 BPM | DIASTOLIC BLOOD PRESSURE: 84 MMHG | HEIGHT: 70 IN | BODY MASS INDEX: 25.5 KG/M2 | WEIGHT: 178.13 LBS | RESPIRATION RATE: 18 BRPM | SYSTOLIC BLOOD PRESSURE: 112 MMHG | TEMPERATURE: 98 F

## 2025-07-10 DIAGNOSIS — F41.9 ANXIETY DISORDER, UNSPECIFIED TYPE: ICD-10-CM

## 2025-07-10 DIAGNOSIS — M51.26 HERNIATED LUMBAR INTERVERTEBRAL DISC: ICD-10-CM

## 2025-07-10 DIAGNOSIS — G47.33 OSA (OBSTRUCTIVE SLEEP APNEA): ICD-10-CM

## 2025-07-10 DIAGNOSIS — Z86.73 HISTORY OF TRANSIENT ISCHEMIC ATTACK (TIA): ICD-10-CM

## 2025-07-10 DIAGNOSIS — M47.816 LUMBAR FACET ARTHROPATHY: ICD-10-CM

## 2025-07-10 DIAGNOSIS — Z01.818 PRE-OP EXAM: ICD-10-CM

## 2025-07-10 DIAGNOSIS — M48.061 NEUROFORAMINAL STENOSIS OF LUMBAR SPINE: ICD-10-CM

## 2025-07-10 DIAGNOSIS — Z01.818 PRE-OP EXAM: Primary | ICD-10-CM

## 2025-07-10 DIAGNOSIS — R73.03 PREDIABETES: ICD-10-CM

## 2025-07-10 DIAGNOSIS — E78.5 DYSLIPIDEMIA: ICD-10-CM

## 2025-07-10 LAB
OHS QRS DURATION: 84 MS
OHS QTC CALCULATION: 450 MS

## 2025-07-10 PROCEDURE — 71046 X-RAY EXAM CHEST 2 VIEWS: CPT | Mod: TC,PO

## 2025-07-10 PROCEDURE — 3074F SYST BP LT 130 MM HG: CPT | Mod: CPTII,S$GLB,, | Performed by: FAMILY MEDICINE

## 2025-07-10 PROCEDURE — 3044F HG A1C LEVEL LT 7.0%: CPT | Mod: CPTII,S$GLB,, | Performed by: FAMILY MEDICINE

## 2025-07-10 PROCEDURE — G2211 COMPLEX E/M VISIT ADD ON: HCPCS | Mod: S$GLB,,, | Performed by: FAMILY MEDICINE

## 2025-07-10 PROCEDURE — 1159F MED LIST DOCD IN RCRD: CPT | Mod: CPTII,S$GLB,, | Performed by: FAMILY MEDICINE

## 2025-07-10 PROCEDURE — 93005 ELECTROCARDIOGRAM TRACING: CPT | Mod: S$GLB,,, | Performed by: FAMILY MEDICINE

## 2025-07-10 PROCEDURE — 93010 ELECTROCARDIOGRAM REPORT: CPT | Mod: S$GLB,,, | Performed by: INTERNAL MEDICINE

## 2025-07-10 PROCEDURE — 71046 X-RAY EXAM CHEST 2 VIEWS: CPT | Mod: 26,,, | Performed by: RADIOLOGY

## 2025-07-10 PROCEDURE — 1160F RVW MEDS BY RX/DR IN RCRD: CPT | Mod: CPTII,S$GLB,, | Performed by: FAMILY MEDICINE

## 2025-07-10 PROCEDURE — 3008F BODY MASS INDEX DOCD: CPT | Mod: CPTII,S$GLB,, | Performed by: FAMILY MEDICINE

## 2025-07-10 PROCEDURE — 99999 PR PBB SHADOW E&M-EST. PATIENT-LVL IV: CPT | Mod: PBBFAC,,, | Performed by: FAMILY MEDICINE

## 2025-07-10 PROCEDURE — 99214 OFFICE O/P EST MOD 30 MIN: CPT | Mod: S$GLB,,, | Performed by: FAMILY MEDICINE

## 2025-07-10 PROCEDURE — 3079F DIAST BP 80-89 MM HG: CPT | Mod: CPTII,S$GLB,, | Performed by: FAMILY MEDICINE

## 2025-07-10 RX ORDER — FLUTICASONE PROPIONATE 50 MCG
SPRAY, SUSPENSION (ML) NASAL
Qty: 48 G | Refills: 3 | Status: SHIPPED | OUTPATIENT
Start: 2025-07-10

## 2025-07-10 NOTE — PROGRESS NOTES
Subjective     Patient ID: Calvin Smith is a 60 y.o. male.    Chief Complaint: Pre-op Exam (Lumbar fusion) and Medication Refill (Flonase )  60-year-old white male with lumbar facet arthropathy, lumbar disc herniation, lumbar neural foraminal stenosis, history of TIA, dyslipidemia, prediabetes, anxiety, and sleep apnea presents to clinic today for a preoperative exam in order to have anterior-posterior lumbar fusion of L5-S1 performed by Dr. Carrasco on August 5, 2025.  Patient had a previous TIA many years ago and remains stable on aspirin 81 mg daily.  Hyperlipidemia remains well controlled on Lipitor 10 mg daily.  Prediabetes is stable on metformin 500 mg daily with current hemoglobin A1c of 5.8.  Anxiety remains stable on Zoloft 50 mg daily.  Sleep apnea is well-controlled with use of CPAP nightly.  Medication Refill  Pertinent negatives include no abdominal pain, chest pain, chills, congestion, coughing, fatigue, fever, headaches, myalgias, nausea, neck pain, rash, sore throat or vomiting.     Review of Systems   Constitutional:  Negative for appetite change, chills, fatigue and fever.   HENT:  Negative for nasal congestion, ear pain, hearing loss, postnasal drip, rhinorrhea, sinus pressure/congestion, sore throat and tinnitus.    Eyes:  Negative for redness, itching and visual disturbance.   Respiratory:  Negative for cough, chest tightness and shortness of breath.    Cardiovascular:  Negative for chest pain and palpitations.   Gastrointestinal:  Negative for abdominal pain, constipation, diarrhea, nausea and vomiting.   Genitourinary:  Negative for decreased urine volume, difficulty urinating, dysuria, frequency, hematuria and urgency.   Musculoskeletal:  Negative for back pain, myalgias, neck pain and neck stiffness.   Integumentary:  Negative for rash.   Neurological:  Negative for dizziness, light-headedness and headaches.   Psychiatric/Behavioral: Negative.            Objective     Physical Exam  Vitals and  nursing note reviewed.   Constitutional:       General: He is not in acute distress.     Appearance: Normal appearance. He is well-developed. He is not diaphoretic.   HENT:      Head: Normocephalic and atraumatic.      Right Ear: External ear normal.      Left Ear: External ear normal.      Nose: Nose normal.      Mouth/Throat:      Pharynx: No oropharyngeal exudate.   Eyes:      General: No scleral icterus.        Right eye: No discharge.         Left eye: No discharge.      Conjunctiva/sclera: Conjunctivae normal.      Pupils: Pupils are equal, round, and reactive to light.   Neck:      Thyroid: No thyromegaly.      Vascular: No JVD.      Trachea: No tracheal deviation.   Cardiovascular:      Rate and Rhythm: Normal rate and regular rhythm.      Heart sounds: Normal heart sounds. No murmur heard.     No friction rub. No gallop.   Pulmonary:      Effort: Pulmonary effort is normal. No respiratory distress.      Breath sounds: Normal breath sounds. No stridor. No wheezing, rhonchi or rales.   Chest:      Chest wall: No tenderness.   Abdominal:      General: Bowel sounds are normal. There is no distension.      Palpations: Abdomen is soft. There is no mass.      Tenderness: There is no abdominal tenderness. There is no guarding or rebound.   Musculoskeletal:         General: No tenderness. Normal range of motion.      Cervical back: Normal range of motion and neck supple.   Lymphadenopathy:      Cervical: No cervical adenopathy.   Skin:     General: Skin is warm and dry.      Coloration: Skin is not pale.      Findings: No erythema or rash.   Neurological:      Mental Status: He is alert and oriented to person, place, and time.   Psychiatric:         Mood and Affect: Mood normal.         Behavior: Behavior normal.         Thought Content: Thought content normal.         Judgment: Judgment normal.     EKG: Normal sinus rhythm ventricular rate 92 beats per minute.  Component      Latest Ref Rng 7/10/2025   WBC       3.90 - 12.70 K/uL 8.26    RBC      4.60 - 6.20 M/uL 5.06    Hemoglobin      14.0 - 18.0 gm/dL 14.7    Hematocrit      40.0 - 54.0 % 43.7    MCV      82 - 98 fL 86    MCH      27.0 - 31.0 pg 29.1    MCHC      32.0 - 36.0 g/dL 33.6    RDW      11.5 - 14.5 % 13.0    Platelet Count      150 - 450 K/uL 318    MPV      9.2 - 12.9 fL 10.6    nRBC      <=0 /100 WBC 0    Neut %      38 - 73 % 68.5    Lymph %      18 - 48 % 21.8    Mono %      4 - 15 % 6.8    Eos %      <=8 % 2.3    Basophil %      <=1.9 % 0.5    Immature Granulocytes      0.0 - 0.5 % 0.1    Gran # (ANC)      1.8 - 7.7 K/uL 5.66    Lymph #      1 - 4.8 K/uL 1.80    Mono #      0.3 - 1 K/uL 0.56    Eos #      <=0.5 K/uL 0.19    Baso #      <=0.2 K/uL 0.04    Immature Grans (Abs)      0.00 - 0.04 K/uL 0.01    Sodium      136 - 145 mmol/L 139    Potassium      3.5 - 5.1 mmol/L 3.7    Chloride      95 - 110 mmol/L 107    CO2      23 - 29 mmol/L 24    Glucose      70 - 110 mg/dL 174 (H)    BUN      6 - 20 mg/dL 16    Creatinine      0.5 - 1.4 mg/dL 0.9    Calcium      8.7 - 10.5 mg/dL 9.4    PROTEIN TOTAL      6.0 - 8.4 gm/dL 7.2    Albumin      3.5 - 5.2 g/dL 4.3    BILIRUBIN TOTAL      0.1 - 1.0 mg/dL 0.7    ALP      40 - 150 unit/L 68    AST      11 - 45 unit/L 31    ALT      10 - 44 unit/L 54 (H)    Anion Gap      8 - 16 mmol/L 8    eGFR      >60 mL/min/1.73/m2 >60    Color, UA      Straw, Tawanna, Yellow, Light-Orange  Yellow    Appearance, UA      Clear  Clear    pH, UA      5.0 - 8.0  6.0    Spec Grav UA      1.005 - 1.030  1.025    Protein, UA      Negative  Negative    Glucose, UA      Negative  Negative    Ketones, UA      Negative  Negative    Bilirubin (UA)      Negative  Negative    Blood, UA      Negative  Negative    NITRITE UA      Negative  Negative    Urobilinogen, UA      <2.0 EU/dL Negative    Leukocyte Esterase, UA      Negative  Negative    Specimen Outdate 07/13/2025 23:59    Group & Rh A NEG    INDIRECT SOMMER NEG    PT      9.0 - 12.5  seconds 11.9    INR      0.8 - 1.2  1.1    PTT      21.0 - 32.0 seconds 32.9 (H)       Legend:  (H) High       Assessment and Plan     1. Pre-op exam  -     CBC Auto Differential; Future; Expected date: 07/10/2025  -     Comprehensive Metabolic Panel; Future; Expected date: 07/10/2025  -     Urinalysis, Reflex to Urine Culture Urine, Clean Catch; Future; Expected date: 07/10/2025  -     Protime-INR; Future; Expected date: 07/10/2025  -     APTT; Future; Expected date: 07/10/2025  -     IN OFFICE EKG 12-LEAD (to Muse)  -     X-Ray Chest PA And Lateral; Future; Expected date: 07/10/2025  -     Type & Screen; Future; Expected date: 07/10/2025    2. Lumbar facet arthropathy    3. Herniated lumbar intervertebral disc  Overview:  L3-L4 through L5-S1      4. Neuroforaminal stenosis of lumbar spine    5. History of transient ischemic attack (TIA)  Overview:  Dec 2019      6. Dyslipidemia    7. Prediabetes    8. Anxiety disorder, unspecified type    9. MARY JO (obstructive sleep apnea)        1. CBC, CMP, UA, PT, PTT, INR, type and screen, EKG, and chest x-ray.  2, 3, 4.  Continue follow-up with Orthopedics as scheduled for continued treatment of back pain.    5. The patient has a previous history of TIA which remains stable on aspirin 81 mg daily.  It is okay for him to hold aspirin 5 days prior to surgery and restart aspirin 24 hours post surgery.  6. Continue Lipitor 10 mg daily.  Dyslipidemia is well-controlled.    7. Continue metformin 500 mg daily.  Prediabetes is well-controlled.  8. Continue Zoloft 50 mg daily.  Anxiety is stable.    9. Continue CPAP nightly.  Sleep apnea is well-controlled.  10. The patient is okay to proceed with surgery as scheduled.  11. Return to clinic as needed or in 7 months for annual physical exam.           Follow up in about 7 months (around 2/10/2026), or if symptoms worsen or fail to improve, for Annual exam.

## 2025-07-11 ENCOUNTER — TELEPHONE (OUTPATIENT)
Dept: INTERNAL MEDICINE | Facility: CLINIC | Age: 60
End: 2025-07-11
Payer: COMMERCIAL

## 2025-07-11 NOTE — TELEPHONE ENCOUNTER
Faxed pre op clearance form + notes, labs and EKG to [facility]; Dr. Olaf Carrasco; fax [phone]

## 2025-07-23 ENCOUNTER — OFFICE VISIT (OUTPATIENT)
Dept: PAIN MEDICINE | Facility: CLINIC | Age: 60
End: 2025-07-23
Payer: COMMERCIAL

## 2025-07-23 VITALS
RESPIRATION RATE: 19 BRPM | DIASTOLIC BLOOD PRESSURE: 74 MMHG | OXYGEN SATURATION: 97 % | HEART RATE: 97 BPM | BODY MASS INDEX: 25.47 KG/M2 | TEMPERATURE: 98 F | WEIGHT: 177.94 LBS | HEIGHT: 70 IN | SYSTOLIC BLOOD PRESSURE: 112 MMHG

## 2025-07-23 DIAGNOSIS — M75.111 NONTRAUMATIC INCOMPLETE TEAR OF RIGHT ROTATOR CUFF: ICD-10-CM

## 2025-07-23 DIAGNOSIS — M25.511 CHRONIC RIGHT SHOULDER PAIN: Primary | ICD-10-CM

## 2025-07-23 DIAGNOSIS — G89.29 CHRONIC RIGHT SHOULDER PAIN: Primary | ICD-10-CM

## 2025-07-23 DIAGNOSIS — M51.360 DEGENERATION OF INTERVERTEBRAL DISC OF LUMBAR REGION WITH DISCOGENIC BACK PAIN: ICD-10-CM

## 2025-07-23 PROCEDURE — G2211 COMPLEX E/M VISIT ADD ON: HCPCS | Mod: S$GLB,,, | Performed by: STUDENT IN AN ORGANIZED HEALTH CARE EDUCATION/TRAINING PROGRAM

## 2025-07-23 PROCEDURE — 3008F BODY MASS INDEX DOCD: CPT | Mod: CPTII,S$GLB,, | Performed by: STUDENT IN AN ORGANIZED HEALTH CARE EDUCATION/TRAINING PROGRAM

## 2025-07-23 PROCEDURE — 99214 OFFICE O/P EST MOD 30 MIN: CPT | Mod: S$GLB,,, | Performed by: STUDENT IN AN ORGANIZED HEALTH CARE EDUCATION/TRAINING PROGRAM

## 2025-07-23 PROCEDURE — 3078F DIAST BP <80 MM HG: CPT | Mod: CPTII,S$GLB,, | Performed by: STUDENT IN AN ORGANIZED HEALTH CARE EDUCATION/TRAINING PROGRAM

## 2025-07-23 PROCEDURE — 3074F SYST BP LT 130 MM HG: CPT | Mod: CPTII,S$GLB,, | Performed by: STUDENT IN AN ORGANIZED HEALTH CARE EDUCATION/TRAINING PROGRAM

## 2025-07-23 PROCEDURE — 1160F RVW MEDS BY RX/DR IN RCRD: CPT | Mod: CPTII,S$GLB,, | Performed by: STUDENT IN AN ORGANIZED HEALTH CARE EDUCATION/TRAINING PROGRAM

## 2025-07-23 PROCEDURE — 1159F MED LIST DOCD IN RCRD: CPT | Mod: CPTII,S$GLB,, | Performed by: STUDENT IN AN ORGANIZED HEALTH CARE EDUCATION/TRAINING PROGRAM

## 2025-07-23 PROCEDURE — 99999 PR PBB SHADOW E&M-EST. PATIENT-LVL IV: CPT | Mod: PBBFAC,,, | Performed by: STUDENT IN AN ORGANIZED HEALTH CARE EDUCATION/TRAINING PROGRAM

## 2025-07-23 PROCEDURE — 3044F HG A1C LEVEL LT 7.0%: CPT | Mod: CPTII,S$GLB,, | Performed by: STUDENT IN AN ORGANIZED HEALTH CARE EDUCATION/TRAINING PROGRAM

## 2025-07-23 RX ORDER — DICLOFENAC SODIUM 10 MG/G
2 GEL TOPICAL DAILY
Qty: 100 G | Refills: 2 | Status: SHIPPED | OUTPATIENT
Start: 2025-07-23

## 2025-07-23 NOTE — PROGRESS NOTES
Chronic patient Established Note (Follow up visit)      SUBJECTIVE:    INTERVAL HISTORY 7/23/2025:  Calvin Smith presents to the clinic for a follow-up appointment for chronic pain (right shoulder and lower back/buttock). Current pain intensity is 8/10.  He reports with no movement of the right shoulder, he has no pain.  He has been continuing his home exercise program. Since the last visit, Calvin Smith states:  he has limitation in movement of the right shoulder.  He felt the transforaminal injection didn't help with his pain.  He is tentatively scheduled for L5/S1 TLIF with Dr. Connor in 2 weeks.  He is interested in a shot for his right shoulder.    PRIOR HISTORY:   Pain Disability Index Review:      7/23/2025    10:21 AM 6/21/2024     1:07 PM 10/31/2023     9:23 AM   Last 3 PDI Scores   Pain Disability Index (PDI) 56 56 0     INTERVAL HISTORY 4/15/2025:  Mr. Carrasquillo returns for lower back pain. Current Pain level is 10/10.  He feels pain in his hips and his buttock.   He feels the pain is worse than it was in the past.  He reports he finds it difficult to put his clothes on, he can't turn side-to-side in his bed. He has an appointment to see a spine surgeon tomorrow.    INTERVAL HISTORY 9/30/2024:  Mr. Smith returns for right shoulder pain. Current Pain level is 10/10.  He states that his lower back feels much better since his epidural injection. He has difficulty at night with moving the right shoulder.  He hasn't tried taking any medications for this shoulder pain. He hasn't tried physical therapy for his right shoulder. He does report excellent relief of his right shoulder pain after his subacromial bursa injection on 6/21/24, but reports the relief was short lived. He is interested in repeating this.    INTERVAL HISTORY 6/21/2024:  Mr. Smith returns for right shoulder, right thumb, and lower back pain. Current Pain level is 9/10.  He reports that last night the shoulder was unbearable and wanted  to return to clinic for a steroid injection as soon as possible.  He continues to have lower back pain, and is tentatively scheduled for a bilateral L5/S1 transforaminal epidural steroid injection for vertebrogenic pain.    INTERVAL HISTORY 6/20/2024:  Ms. Lau presents for chronic back pain. Current Pain level is 9/10.  He reports that he continues to do his home exercises and feels that it does help a little. However, he is noticing diminishing returns. He continues to feel the pain is across his lower back like a band, and is worse with lifting/flexing forward, and at night while lying in bed.  He completed the healthy back program and he has continued maintaining the exercises as much as tolerated.  He also reports right shoulder pain and right hand pain which is new since his last visit.  He does report that he had similar left hand pain which resolved after a steroid injection.    INTERVAL HISTORY 9/19/23:  Mr. Smith presents today with history of lower back pain. His current pain level is 0/10.  He feels his pain is better controlled at this point.  He has been playing golf more frequently now. He reports he had an event recently and had a small exacerbation which went away with his tizanidine and ibuprofen.  He feels satisfied with his improvement. He is looking forward to a golf tournament in December.  He still has his first appointment with healthy back program in mid-November.    INTERVAL HISTORY 9/19/23:  He states that he has been trying to get with physical therapy, however he has been having issues with scheduling.  He has started some exercises at home, and he feels his pain has been improving.  He reports that he has been able to take less of the tizanidine and ibuprofen. He is anxious about his hip, as he was told by a surgeon that he may have a hip fracture.  He does admit to some soreness around the left hip.  He also admits that he does sometimes wake up when he turns to the left or right  side.    INITIAL HPI 9/12/23:  Calvin Smith presents to the clinic for the evaluation of lower back pain. He states he has had back pain for 20 years, however he has had exacerbations off/on.  He states that the pain is just in the lower back (no radiation down his legs). He presented to the emergency room on 9/7/23 due to the most severe exacerbation while toweling himself off after a bath.The pain is located in the lower back area and radiates to the sides of the back (not down the legs).  The pain is described as aching and is rated as 6/10. The pain is rated with a score of  0/10 on the BEST day and a score of 10/10 on the WORST day.  Symptoms interfere with daily activity. The pain is exacerbated by Extension and Flexing.  The pain is mitigated by medications.  The patient reports 4 hours of uninterrupted sleep per night (not related to pain).     Patient denies urinary incontinence, bowel incontinence, and significant motor weakness.      Physical Therapy/Home Exercise: yes, currently consistent with home exercise program     Pain Medications:   - ibuprofen 600mg   - robaxin 500mg      report:  Reviewed and consistent with medication use as prescribed.     Pain Procedures:   Had Prior Epidural with no relief.     Imaging:   MRI SHOULDER WITHOUT CONTRAST RIGHT     CLINICAL HISTORY:  Shoulder pain, labral tear suspected, xray done;  Pain in right shoulder     TECHNIQUE:  Multiplanar multisequence MRI examination of right shoulder.     COMPARISON:  Right shoulder radiograph 06/20/2024.     FINDINGS:  ROTATOR CUFF:     Supraspinatus: Partial tear at the footplate of the supraspinatus 2 x 7 mm with associated tendinosis.     Infraspinatus: Intact.  Distal aspect intermediate signal, consistent with tendinosis.     Subscapularis: Distal aspect intermediate signal, consistent with tendinosis.     Teres Minor: Intact.  No tendinosis.     There is small volume of fluid within the subdeltoid bursa.     LABRUM:  "Superior labrum appears grossly intact on this standard non arthrogram exam.Posterior aspect of superior labrum ( "peel-back" location) appears intact. Posterior labrum tear at the level of the mid equator.  Anterior inferior labrum appears intact. IGHL:Intact.     LONG HEAD BICEPS TENDON: Located within bicipital groove and intact yet attenuated.Biceps-labral anchor is intact. Intermediate signal, consistent with tendinosis.  No tenosynovitis. Rotator Interval is normal. Biceps pulley is intact.     BONES: No evident fracture.Mild bone marrow edema of the acromioclavicular joint.  AC joint demonstrates normal alignment with moderate hypertrophy.No significant osteo-acromial outlet narrowing..  There is no evident os acromiale.     CARTILAGE: Humeral head and glenoid cartilage preserved without focal defects. No subchondral marrow edema.  No synovial abnormality or intra-articular loose bodies. Glenoid fossa demonstrates no sclerosis.     MUSCLES:  Mild fatty atrophy of the subscapularis muscle and teres minor muscle.  The remainder of the muscle bulk is within normal limits.     Impression:     1. Partial thickness tear at the articular aspect of the footplate of the supraspinatus tendon.  2. Tendinosis of the infraspinatus, subscapularis, and biceps tendons.  3. Mild fatty atrophy of the subscapularis and teres minor muscles.  4. Posterior labral tear mid equator level.     Electronically signed by resident: Paulette Archer  Date:                                            09/27/2024  Time:                                           08:29     Electronically signed by:Dc Quiñones MD  Date:                                            09/27/2024  Time:                                           09:39    XR SHOULDER TRAUMA 3 VIEW RIGHT     CLINICAL HISTORY:  Unspecified injury of right shoulder and upper arm, subsequent encounter     TECHNIQUE:  Three or four views of the right shoulder were performed.   "   COMPARISON:  None     FINDINGS:  Bones are well mineralized.  The glenohumeral joint and AC joint appear intact.  No fracture or dislocation is seen.  No significant degenerative changes.  No soft tissue abnormality.     Impression:     No acute abnormality        Electronically signed by:Eduardo Armstrong MD  Date:                                            06/20/2024  Time:                                           12:02    XR HAND COMPLETE 3 VIEW RIGHT     CLINICAL HISTORY:  Pain in right hand     TECHNIQUE:  PA, lateral, and oblique views of the right hand were performed.     COMPARISON:  None     FINDINGS:  Bones are well mineralized.  Alignment is satisfactory and joint spaces appear adequately maintained.  No fracture, dislocation, or erosive change.  No significant degenerative changes.  No soft tissue abnormality.     Impression:     No acute abnormality        Electronically signed by:Eduardo Armstrong MD  Date:                                            06/20/2024  Time:                                           12:04    XR HIPS BILATERAL 2 VIEW INCL AP PELVIS     CLINICAL HISTORY:  Trochanteric bursitis, unspecified hip     TECHNIQUE:  AP view of the pelvis and frogleg lateral views of both hips were performed.     COMPARISON:  None     FINDINGS:  Femoral heads are well located with respect to the acetabula.  Femoral heads maintain a normal round contour.  No acute fracture seen.  Mild osteophyte formation on the left without significant joint space narrowing.     Impression:     No acute osseous abnormality seen.        Electronically signed by: Jacque Oropeza  Date:                                            09/19/2023  Time:                                           10:21    MRI LUMBAR SPINE WITHOUT CONTRAST     CLINICAL HISTORY:  Spinal stenosis, lumbar;     TECHNIQUE:  Multiplanar, multisequence MR images were acquired from the thoracolumbar junction to the sacrum without contrast.     COMPARISON:  CT  09/06/2023, x-ray 09/06/2023     FINDINGS:  Alignment: Mild levo scoliosis.  Minimal grade 1 retrolisthesis L4-5.  Bilateral spondylolysis L5 with grade 1 anterolisthesis L5-S1.     Vertebrae: Benign osseous hemangioma at L4.  Chronic degenerative endplate change at L4-L5 and L5-S1.     Discs: Degenerative disc desiccation from L3-L4 through L5-S1 with mild L4-L5 and severe L5-S1 height loss.     Cord: Conus terminates at L1 and appears unremarkable.  Cauda equina appears unremarkable.     Degenerative findings:     *T12-L1: No spinal canal stenosis or neural foraminal narrowing.  *L1-L2: No spinal canal stenosis or neural foraminal narrowing.  *L2-L3: No spinal canal stenosis or neural foraminal narrowing.  *L3-L4: Mild broad-based posterior disc bulge with right subarticular protrusion that causes mass effect on the right lateral recess and may impinge upon the right descending L4 nerve root.  Bilateral facet arthropathy and bilateral ligamentum flavum hypertrophy.  Mild spinal canal and moderate right lateral recess stenosis.  *L4-L5: Circumferential disc bulge.  Bilateral facet arthropathy and bilateral ligamentum flavum hypertrophy.  Mild-to-moderate bilateral lateral recess stenosis with possible impingement of the right descending L5 nerve root.  Moderate right and mild left neural foraminal narrowing.  *L5-S1: Grade 1 anterolisthesis with uncovering of the intervertebral disc.  Bilateral facet arthropathy.  No spinal canal stenosis.  Moderate to severe bilateral neural foraminal narrowing with suspected impingement of the exiting L5 nerve roots.  Paraspinal muscles & soft tissues: Right simple renal cyst.     Impression:     1. Bilateral L5 spondylolysis with grade 1 anterolisthesis of L5 on S1 contributing to moderate to severe bilateral neural foraminal narrowing with suspected impingement of the exiting L5 nerve roots.  2. Additional lumbar spondylosis at L3-L4 and L4-L5 as detailed above.      Electronically signed by resident: Phil Mccray  Date:                                            09/07/2023  Time:                                           14:11     Electronically signed by: Myles Orozco MD  Date:                                            09/07/2023  Time:                                           15:43         CT LUMBAR SPINE WITHOUT CONTRAST     CLINICAL HISTORY:  Low back pain, no red flags, no prior management;     TECHNIQUE:  Low-dose axial, sagittal and coronal reformations are obtained through the lumbar spine.  Contrast was not administered.     COMPARISON:  X-ray 09/06/2023     FINDINGS:  No acute fractures of the lumbar spine.     Moderate disc space narrowing at L5-S1.  Grade 1 spondylolisthesis of L5 on S1.  Minimal grade 1 retrolisthesis of L4 on L5.     Bilateral spondylolysis of L5 with associated facet arthropathy at L5-S1 and L4-5.     L1-2: No significant abnormality.     L2-3: No significant abnormality.     L3-4: Mild diffuse disc protrusion.  Mild ligamentum flavum hypertrophy.  Severe central canal narrowing.  Neural foramen are adequately maintained.     L4-5: Mild diffuse posterior disc osteophyte complex with mild disc protrusion.  Bilateral facet arthropathy.  Mild-moderate central canal narrowing.  Severe right foraminal narrowing.     L5-S1: Bilateral spondylolysis with grade 1 spondylolisthesis and slight uncovering of the disc posteriorly with mild protrusion.  Severe bilateral foraminal narrowing.  Central canal is adequately maintained.     The remaining visualized paravertebral structures demonstrate no pathology.     Impression:     No acute abnormality.     Multilevel chronic and degenerative changes.  See above comments.        Electronically signed by: Owen Sinclair  Date:                                            09/06/2023  Time:                                           20:05     XR LUMBAR SPINE AP AND LATERAL     CLINICAL HISTORY:  low back  pain;     TECHNIQUE:  AP, lateral and spot images were performed of the lumbar spine.     COMPARISON:  None     FINDINGS:  Five lumbar vertebral bodies.  Vertebral body heights are maintained.     Disc space narrowing and endplate changes L5-S1.     Grade 1 anterolisthesis L5-S1 suspected to be on account of pars defects.     Impression:     Please see above.        Electronically signed by: Jacque Oropeza  Date:                                            09/06/2023  Time:                                           16:35    Allergies: Review of patient's allergies indicates:  No Known Allergies    Current Medications:   Current Outpatient Medications   Medication Sig Dispense Refill    aspirin (ECOTRIN) 81 MG EC tablet Take 1 tablet (81 mg total) by mouth once daily. 90 tablet 3    atorvastatin (LIPITOR) 10 MG tablet TAKE 1 TABLET ONCE DAILY 90 tablet 3    fluticasone propionate (FLONASE) 50 mcg/actuation nasal spray SHAKE LIQUID AND USE 1 SPRAY IN EACH NOSTRIL TWICE DAILY 48 g 3    ibuprofen (ADVIL,MOTRIN) 800 MG tablet Take 1 tablet (800 mg total) by mouth every 8 (eight) hours as needed for Pain. 90 tablet 2    metFORMIN (GLUCOPHAGE-XR) 500 MG ER 24hr tablet TAKE 1 TABLET DAILY WITH   BREAKFAST 90 tablet 3    sertraline (ZOLOFT) 50 MG tablet Take 1 tablet (50 mg total) by mouth once daily. 90 tablet 3    diclofenac sodium (VOLTAREN ARTHRITIS PAIN) 1 % Gel Apply 2 g topically once daily. 100 g 2    walker Misc 1 each by Misc.(Non-Drug; Combo Route) route once. Rolling walker for 1 dose 1 each 0     No current facility-administered medications for this visit.       REVIEW OF SYSTEMS:    GENERAL:  No weight loss, malaise or fevers.  HEENT:  Negative for frequent or significant headaches.  NECK:  Negative for lumps, goiter, pain and significant neck swelling.  RESPIRATORY:  Negative for cough, wheezing or shortness of breath.  CARDIOVASCULAR:  Negative for chest pain, leg swelling or palpitations.  GI:  Negative for  abdominal discomfort, blood in stools or black stools or change in bowel habits.  MUSCULOSKELETAL:  See HPI.  SKIN:  Negative for lesions, rash, and itching.  PSYCH:  Negative for sleep disturbance, mood disorder and recent psychosocial stressors.  HEMATOLOGY/LYMPHOLOGY:  Negative for prolonged bleeding, bruising easily or swollen nodes.  NEURO:   No history of headaches, syncope, paralysis, seizures or tremors.  All other reviewed and negative other than HPI.    Past Medical History:  Past Medical History:   Diagnosis Date    Anxiety     Colon polyp     Fatty liver     TIA (transient ischemic attack)        Past Surgical History:  Past Surgical History:   Procedure Laterality Date    APPENDECTOMY  2005    COLONOSCOPY  11/2019    EPIDURAL STEROID INJECTION INTO LUMBAR SPINE  2018    INJECTION OF ANESTHETIC AGENT AROUND MEDIAL BRANCH NERVES INNERVATING LUMBAR FACET JOINT  2019    INJECTION, SPINE, LUMBOSACRAL, TRANSFORAMINAL APPROACH Bilateral 7/2/2024    Procedure: Bilateral L5/S1 TFESI;  Surgeon: Any Thomas MD;  Location: [facility];  Service: Pain Management;  Laterality: Bilateral;  20 mins  ASA 5 days    INJECTION, SPINE, LUMBOSACRAL, TRANSFORAMINAL APPROACH Bilateral 4/24/2025    Procedure: LUMBAR TRANSFORAMINAL BILATERAL L5/S1 MEDICALLY URGENT;  Surgeon: Any Thomas MD;  Location: [facility];  Service: Pain Management;  Laterality: Bilateral;  STAT FOR THURSDAY 4/17  2 WK F/U LUCILA       Family History:  Family History   Problem Relation Name Age of Onset    No Known Problems Mother      Leukemia Father      No Known Problems Sister      No Known Problems Brother      No Known Problems Sister      No Known Problems Brother         Social History:  Social History     Socioeconomic History    Marital status:      Spouse name: Leigh Reese    Number of children: 2   Tobacco Use    Smoking status: Some Days     Types: Cigars    Smokeless tobacco: Never   Vaping Use    Vaping status: Never Used    Substance and Sexual Activity    Alcohol use: Not Currently     Alcohol/week: 0.0 - 1.0 standard drinks of alcohol     Comment: hardly ever    Drug use: Never    Sexual activity: Yes     Partners: Female   Social History Narrative    He is satisfied with weight.    Rates diet as fair to good.    He does not drink at least 1/2 gallon water daily.    He drinks 1-2 coffee/tea/caffeine-containing soft drinks daily.    Total sleep time at night is 6-7 hours (poor quality).    He works 40-50 hours per week.    He does wear seat belts.    Hobbies include tennis.     Social Drivers of Health     Financial Resource Strain: Low Risk  (9/7/2023)    Overall Financial Resource Strain (CARDIA)     Difficulty of Paying Living Expenses: Not hard at all   Food Insecurity: No Food Insecurity (4/16/2025)    Received from [facility]    Hunger Vital Sign     Worried About Running Out of Food in the Last Year: Never true     Ran Out of Food in the Last Year: Never true   Transportation Needs: No Transportation Needs (4/16/2025)    Received from [facility]    PRAPARE - Transportation     Lack of Transportation (Medical): No     Lack of Transportation (Non-Medical): No   Physical Activity: Sufficiently Active (9/7/2023)    Exercise Vital Sign     Days of Exercise per Week: 3 days     Minutes of Exercise per Session: 60 min   Stress: No Stress Concern Present (9/7/2023)    [employer] [place] [employer] - Occupational Stress Questionnaire     Feeling of Stress : Only a little   Housing Stability: Low Risk  (4/16/2025)    Received from [facility] [employer]    Housing Stability Vital Sign     Unable to Pay for Housing in the Last Year: No     Number of Times Moved in the Last Year: 1     Homeless in the Last Year: No     OBJECTIVE:    General appearance: Well appearing, in no acute distress, alert and oriented x3.  Psych:  Mood and affect appropriate.    /74 (BP Location: Right arm, Patient Position: Sitting)   Pulse 97   Temp  "98.1 °F (36.7 °C) (Oral)   Resp 19   Ht 5' 10" (1.778 m)   Wt 80.7 kg (177 lb 14.6 oz)   SpO2 97%   BMI 25.53 kg/m²     PHYSICAL EXAMINATION:  General appearance: Well appearing, in no acute distress, alert and appropriately communicative.  Psych:  Mood and affect appropriate.  Skin: Skin color, texture, turgor normal, no rashes or lesions, in both upper and lower body.  Head/face:  Atraumatic, normocephalic.  Cor: regular rate  Pulm: non-labored breathing  GI: Abdomen non-distended and non-tender.  Back: Straight leg raising in the sitting and supine positions is negative to radicular pain. No pain to palpation over the spine or paraspinal muscles. Pain with flexion  Extremities: Peripheral joint ROM is full and pain free without obvious instability or laxity in all four extremities.  No deformities, edema, or skin discoloration. Good capillary refill.  Musculoskeletal: shoulder, hip, sacroiliac and knee provocative maneuvers are negative with the exception of right painful arc 60-120deg; right empty can+, right pain on external rotation. Bilateral upper and lower extremity strength is normal and symmetric.  No atrophy or tone abnormalities are noted.  Neuro: Bilateral upper and lower extremity coordination and muscle stretch reflexes are physiologic and symmetric.  Negative Clonus. No loss of sensation is noted.  Gait: Normal.    ASSESSMENT: 60 y.o. year old male with lower back/hip pain, consistent with:     1. Chronic right shoulder pain  Ambulatory referral/consult to Orthopedics    diclofenac sodium (VOLTAREN ARTHRITIS PAIN) 1 % Gel      2. Nontraumatic incomplete tear of right rotator cuff  Ambulatory referral/consult to Orthopedics    diclofenac sodium (VOLTAREN ARTHRITIS PAIN) 1 % Gel      3. Degeneration of intervertebral disc of lumbar region with discogenic back pain              IMPRESSION: Mr. Smith presents for recurrent lower back pain. History and physical exam are consistent with axial " lower back pain/lumbar vertebrogenic pain  Imaging is consistent with lumar degenerative disc disease which is worst at L4/5 and L5/S1 associated with L4, L5, and S1 modic type 2 changes.   He had excellent relief with prior lumbar epidural injection, so we will repeat this for now. He may also be a candidate for L4, L5, S1 intracept procedure, although he was denied by his insurer last time.    PLAN:  - I have stressed the importance of physical activity and a home exercise plan to help with pain and improve health.  - Patient can continue with medications for now since they are providing benefits, using them appropriately, and without side effects  - we discussed the option of repeat right shoulder injection, but he should clear this with Dr. Carrasco first, as steroids may affect his fusion.  - ok to use voltaren 1% topical for myofascial pain; do not use over open sores/wounds; script sent today  - he is tentatively scheduled for L5/S1 fusion with Dr. Carrasco  - continue home exercises as tolerated  - I counseled on maintaining his home exercise program  - Counseled patient regarding the importance of activity modification and physical therapy.  - RTC as needed    The above plan and management options were discussed at length with patient. Patient is in agreement with the above and verbalized understanding.    Any Thomas  07/23/2025